# Patient Record
Sex: FEMALE | Race: WHITE | Employment: FULL TIME | URBAN - METROPOLITAN AREA
[De-identification: names, ages, dates, MRNs, and addresses within clinical notes are randomized per-mention and may not be internally consistent; named-entity substitution may affect disease eponyms.]

---

## 2017-02-28 ENCOUNTER — APPOINTMENT (OUTPATIENT)
Dept: CT IMAGING | Facility: CLINIC | Age: 27
End: 2017-02-28
Attending: EMERGENCY MEDICINE
Payer: COMMERCIAL

## 2017-02-28 ENCOUNTER — HOSPITAL ENCOUNTER (EMERGENCY)
Facility: CLINIC | Age: 27
Discharge: HOME | End: 2017-02-28
Attending: EMERGENCY MEDICINE | Admitting: EMERGENCY MEDICINE
Payer: COMMERCIAL

## 2017-02-28 VITALS
WEIGHT: 274 LBS | RESPIRATION RATE: 15 BRPM | DIASTOLIC BLOOD PRESSURE: 72 MMHG | SYSTOLIC BLOOD PRESSURE: 113 MMHG | HEART RATE: 94 BPM | TEMPERATURE: 98.5 F | OXYGEN SATURATION: 99 % | BODY MASS INDEX: 50.12 KG/M2

## 2017-02-28 DIAGNOSIS — R55 SYNCOPE, UNSPECIFIED SYNCOPE TYPE: ICD-10-CM

## 2017-02-28 LAB
ANION GAP SERPL CALCULATED.3IONS-SCNC: 10 MMOL/L (ref 3–14)
BASOPHILS # BLD AUTO: 0 10E9/L (ref 0–0.2)
BASOPHILS NFR BLD AUTO: 0.4 %
BUN SERPL-MCNC: 14 MG/DL (ref 7–30)
CALCIUM SERPL-MCNC: 9 MG/DL (ref 8.5–10.1)
CHLORIDE SERPL-SCNC: 108 MMOL/L (ref 94–109)
CO2 SERPL-SCNC: 24 MMOL/L (ref 20–32)
CREAT SERPL-MCNC: 0.9 MG/DL (ref 0.52–1.04)
D DIMER PPP FEU-MCNC: 0.4 UG/ML FEU (ref 0–0.5)
DIFFERENTIAL METHOD BLD: NORMAL
EOSINOPHIL # BLD AUTO: 0.1 10E9/L (ref 0–0.7)
EOSINOPHIL NFR BLD AUTO: 1.2 %
ERYTHROCYTE [DISTWIDTH] IN BLOOD BY AUTOMATED COUNT: 12.9 % (ref 10–15)
GFR SERPL CREATININE-BSD FRML MDRD: 76 ML/MIN/1.7M2
GLUCOSE BLDC GLUCOMTR-MCNC: 106 MG/DL (ref 70–99)
GLUCOSE SERPL-MCNC: 92 MG/DL (ref 70–99)
HCG SERPL QL: NEGATIVE
HCT VFR BLD AUTO: 43.5 % (ref 35–47)
HGB BLD-MCNC: 15 G/DL (ref 11.7–15.7)
IMM GRANULOCYTES # BLD: 0 10E9/L (ref 0–0.4)
IMM GRANULOCYTES NFR BLD: 0.3 %
INTERPRETATION ECG - MUSE: NORMAL
LYMPHOCYTES # BLD AUTO: 3.4 10E9/L (ref 0.8–5.3)
LYMPHOCYTES NFR BLD AUTO: 45 %
MCH RBC QN AUTO: 31.7 PG (ref 26.5–33)
MCHC RBC AUTO-ENTMCNC: 34.5 G/DL (ref 31.5–36.5)
MCV RBC AUTO: 92 FL (ref 78–100)
MONOCYTES # BLD AUTO: 0.3 10E9/L (ref 0–1.3)
MONOCYTES NFR BLD AUTO: 3.9 %
NEUTROPHILS # BLD AUTO: 3.8 10E9/L (ref 1.6–8.3)
NEUTROPHILS NFR BLD AUTO: 49.2 %
NRBC # BLD AUTO: 0 10*3/UL
NRBC BLD AUTO-RTO: 0 /100
PLATELET # BLD AUTO: 293 10E9/L (ref 150–450)
POTASSIUM SERPL-SCNC: 3.4 MMOL/L (ref 3.4–5.3)
RBC # BLD AUTO: 4.73 10E12/L (ref 3.8–5.2)
SODIUM SERPL-SCNC: 142 MMOL/L (ref 133–144)
WBC # BLD AUTO: 7.6 10E9/L (ref 4–11)

## 2017-02-28 PROCEDURE — 00000146 ZZHCL STATISTIC GLUCOSE BY METER IP

## 2017-02-28 PROCEDURE — 99284 EMERGENCY DEPT VISIT MOD MDM: CPT | Mod: 25 | Performed by: EMERGENCY MEDICINE

## 2017-02-28 PROCEDURE — 85025 COMPLETE CBC W/AUTO DIFF WBC: CPT | Performed by: EMERGENCY MEDICINE

## 2017-02-28 PROCEDURE — 25000128 H RX IP 250 OP 636: Performed by: EMERGENCY MEDICINE

## 2017-02-28 PROCEDURE — 70450 CT HEAD/BRAIN W/O DYE: CPT

## 2017-02-28 PROCEDURE — 85379 FIBRIN DEGRADATION QUANT: CPT | Performed by: EMERGENCY MEDICINE

## 2017-02-28 PROCEDURE — 93005 ELECTROCARDIOGRAM TRACING: CPT | Performed by: EMERGENCY MEDICINE

## 2017-02-28 PROCEDURE — 99285 EMERGENCY DEPT VISIT HI MDM: CPT | Mod: 25 | Performed by: EMERGENCY MEDICINE

## 2017-02-28 PROCEDURE — 25000128 H RX IP 250 OP 636

## 2017-02-28 PROCEDURE — 84703 CHORIONIC GONADOTROPIN ASSAY: CPT | Performed by: EMERGENCY MEDICINE

## 2017-02-28 PROCEDURE — 96361 HYDRATE IV INFUSION ADD-ON: CPT | Performed by: EMERGENCY MEDICINE

## 2017-02-28 PROCEDURE — 93010 ELECTROCARDIOGRAM REPORT: CPT | Mod: Z6 | Performed by: EMERGENCY MEDICINE

## 2017-02-28 PROCEDURE — 96375 TX/PRO/DX INJ NEW DRUG ADDON: CPT | Mod: 59 | Performed by: EMERGENCY MEDICINE

## 2017-02-28 PROCEDURE — 96374 THER/PROPH/DIAG INJ IV PUSH: CPT | Mod: 59 | Performed by: EMERGENCY MEDICINE

## 2017-02-28 PROCEDURE — 80048 BASIC METABOLIC PNL TOTAL CA: CPT | Performed by: EMERGENCY MEDICINE

## 2017-02-28 RX ORDER — KETOROLAC TROMETHAMINE 30 MG/ML
30 INJECTION, SOLUTION INTRAMUSCULAR; INTRAVENOUS ONCE
Status: COMPLETED | OUTPATIENT
Start: 2017-02-28 | End: 2017-02-28

## 2017-02-28 RX ORDER — SODIUM CHLORIDE 9 MG/ML
INJECTION, SOLUTION INTRAVENOUS
Status: COMPLETED
Start: 2017-02-28 | End: 2017-02-28

## 2017-02-28 RX ORDER — ONDANSETRON 2 MG/ML
4 INJECTION INTRAMUSCULAR; INTRAVENOUS ONCE
Status: COMPLETED | OUTPATIENT
Start: 2017-02-28 | End: 2017-02-28

## 2017-02-28 RX ADMIN — KETOROLAC TROMETHAMINE 30 MG: 30 INJECTION, SOLUTION INTRAMUSCULAR at 11:11

## 2017-02-28 RX ADMIN — ONDANSETRON 4 MG: 2 INJECTION INTRAMUSCULAR; INTRAVENOUS at 11:18

## 2017-02-28 RX ADMIN — Medication 1000 ML: at 11:09

## 2017-02-28 RX ADMIN — SODIUM CHLORIDE 1000 ML: 9 INJECTION, SOLUTION INTRAVENOUS at 11:09

## 2017-02-28 ASSESSMENT — ENCOUNTER SYMPTOMS
NAUSEA: 1
NECK STIFFNESS: 0
COLOR CHANGE: 0
LIGHT-HEADEDNESS: 1
DIFFICULTY URINATING: 0
ABDOMINAL PAIN: 0
HEADACHES: 0
SHORTNESS OF BREATH: 0
CONFUSION: 0
ARTHRALGIAS: 0
EYE REDNESS: 0
FEVER: 0

## 2017-02-28 NOTE — DISCHARGE INSTRUCTIONS
Please make an appointment to follow up with Cardiology(Fainting) Clinic (phone: [phone]).

## 2017-02-28 NOTE — ED AVS SNAPSHOT
[facility], Emergency Department    [address]    [place] [address]    Phone:  [phone]    Fax:  [phone]                                       Alva Joshi   MRN: [id number]    Department:  [facility], Emergency Department   Date of Visit:  2/28/2017           Patient Information     Date Of Birth          1990        Your diagnoses for this visit were:     Syncope, unspecified syncope type        You were seen by Hitesh Oh MD.        Discharge Instructions       Please make an appointment to follow up with Cardiology(Fainting) Clinic (phone: [phone]).     Discharge References/Attachments     SYNCOPE, UNK CAUSE (ENGLISH)      24 Hour Appointment Hotline       To make an appointment at any [facility] clinic, call [phone] ([phone]). If you don't have a family doctor or clinic, we will help you find one. [facility] clinics are conveniently located to serve the needs of you and your family.             Review of your medicines      Our records show that you are taking the medicines listed below. If these are incorrect, please call your family doctor or clinic.        Dose / Directions Last dose taken    MINOCYCLINE HCL PO   Dose:  50 mg        Take 50 mg by mouth 2 times daily   Refills:  0        MIRENA (52 MG) 20 MCG/24HR IUD   Dose:  1 each   Generic drug:  levonorgestrel        1 each by Intrauterine route once   Refills:  0                Procedures and tests performed during your visit     Basic metabolic panel    CBC with platelets differential    D dimer quantitative    EKG 12 lead    Glucose by meter    HCG qualitative pregnancy (blood)    Head CT w/o contrast      Orders Needing Specimen Collection     None      Pending Results     No orders found from 2/26/2017 to 3/1/2017.            Pending Culture Results     No orders found from 2/26/2017 to 3/1/2017.            Thank you for choosing [facility]       Thank you for choosing [facility] for your care. Our  "goal is always to provide you with excellent care. Hearing back from our patients is one way we can continue to improve our services. Please take a few minutes to complete the written survey that you may receive in the mail after you visit with us. Thank you!        [facility] Information     [facility] lets you send messages to your doctor, view your test results, renew your prescriptions, schedule appointments and more. To sign up, go to [email or web address] . Click on \"Log in\" on the left side of the screen, which will take you to the Welcome page. Then click on \"Sign up Now\" on the right side of the page.     You will be asked to enter the access code listed below, as well as some personal information. Please follow the directions to create your username and password.     Your access code is: [email or web address]  Expires: 2017  3:33 PM     Your access code will  in 90 days. If you need help or a new code, please call your [place] clinic or [phone].        Care EveryWhere ID     This is your Care EveryWhere ID. This could be used by other organizations to access your [place] medical records  [id number]        After Visit Summary       This is your record. Keep this with you and show to your community pharmacist(s) and doctor(s) at your next visit.                  "

## 2017-02-28 NOTE — ED NOTES
Patient was at work and taking care of patient and she was standing and felt abdominal cramps.  Then felt she was getting hot and sweaty and then passed out.  She hit front of right upper forehead and has a small bump on head.  She c/o headache. Staff stated she had LOC for about 2 minutes.  She stated she ate a croissant for breakfast ( in ED) and had some coffee.  She denies history of seizures or of ever having fainted before.

## 2017-02-28 NOTE — ED AVS SNAPSHOT
[facility], [place], Emergency Department    [address]    [place] [address]    Phone:  [phone]    Fax:  [phone]                                       Alva Joshi   MRN: [id number]    Department:  [facility], Emergency Department   Date of Visit:  2/28/2017           After Visit Summary Signature Page     I have received my discharge instructions, and my questions have been answered. I have discussed any challenges I see with this plan with the nurse or doctor.    ..........................................................................................................................................  Patient/Patient Representative Signature      ..........................................................................................................................................  Patient Representative Print Name and Relationship to Patient    ..................................................               ................................................  Date                                            Time    ..........................................................................................................................................  Reviewed by Signature/Title    ...................................................              ..............................................  Date                                                            Time

## 2017-02-28 NOTE — ED PROVIDER NOTES
History     Chief Complaint   Patient presents with     Loss of Consciousness     happened about 10am while working in [employer]     HPI  Alva Joshi is a 26 year old female who presents to the emergency department after a episode of loss of consciousness while at work.  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  Patient states that after she experienced the pelvic cramping, she began to feel lightheaded, sweaty, and nauseous.  The patient states that she had a loss of consciousness and fell forward and struck her right forehead.  The patient states that she awoke soon after.  She denies any postictal period.  She denies any tongue biting or bowel or bladder incontinence.  The patient complains of pain where she struck her head in the right forehead.  She denies any visual disturbance.  She denies neck pain.  Patient denies any chest pain or dyspnea.  She states that the pelvic cramping has resolved.  She denies abdominal pain.  She denies nausea, vomiting, and diarrhea.  Patient states that she flew to and from [place] last weekend but she denies any leg pain or swelling.  She denies a history of fainting in the past.  She did eat this morning.    I have reviewed the Medications, Allergies, Past Medical and Surgical History, and Social History in the Epic system.    Review of Systems   Constitutional: Negative for fever.   HENT: Negative for congestion.    Eyes: Negative for redness.   Respiratory: Negative for shortness of breath.    Cardiovascular: Negative for chest pain.   Gastrointestinal: Positive for nausea. Negative for abdominal pain.   Genitourinary: Negative for difficulty urinating.   Musculoskeletal: Negative for arthralgias and neck stiffness.   Skin: Negative for color change.   Neurological: Positive for syncope and light-headedness. Negative for headaches.    Psychiatric/Behavioral: Negative for confusion.   All other systems reviewed and are negative.      Physical Exam   BP: 116/87  Pulse: 115  Temp: 98.5  F (36.9  C)  Resp: 19  Weight: 124.3 kg (274 lb)  SpO2: 100 %  Physical Exam   Constitutional: She is oriented to person, place, and time. She appears well-developed and well-nourished. No distress.   HENT:   Head: Normocephalic.       Mouth/Throat: Oropharynx is clear and moist. No oropharyngeal exudate.   Eyes: Pupils are equal, round, and reactive to light. No scleral icterus.   Neck: Normal range of motion. No spinous process tenderness and no muscular tenderness present. Normal range of motion present.   Cardiovascular: Normal heart sounds and intact distal pulses.  Tachycardia present.    Pulmonary/Chest: Effort normal and breath sounds normal. No respiratory distress.   Abdominal: Soft. Bowel sounds are normal. There is no tenderness.   Musculoskeletal: Normal range of motion. She exhibits no edema or tenderness.   Neurological: She is alert and oriented to person, place, and time. She has normal strength. No cranial nerve deficit or sensory deficit. Coordination normal.   Skin: Skin is warm and dry. No rash noted. She is not diaphoretic.   Nursing note and vitals reviewed.      ED Course     ED Course     Procedures             EKG Interpretation:      Interpreted by NEGAR SAENZ MD  Time reviewed: 1049  Symptoms at time of EKG: None, syncope   Rhythm: sinus tachycardia  Rate: 101  Axis: Normal  Ectopy: none  Conduction: normal  ST Segments/ T Waves: No ST-T wave changes  Q Waves: none  Comparison to prior: No old EKG available    Clinical Impression: sinus tachycardia    Results for orders placed or performed during the hospital encounter of 02/28/17   Head CT w/o contrast    Narrative    CT SCAN OF THE HEAD WITHOUT CONTRAST   2/28/2017  3:01 PM     HISTORY: Headache, trauma.    TECHNIQUE: Axial images of the head and coronal reformations without  IV  contrast material.  Radiation dose for this scan was reduced using  automated exposure control, adjustment of the mA and/or kV according  to patient size, or iterative reconstruction technique.    COMPARISON: None.    FINDINGS: The ventricles are normal in size, shape and configuration.  The brain parenchyma and subarachnoid spaces are normal. There is no  evidence of intracranial hemorrhage, mass, acute infarct or anomaly.     The visualized portions of the sinuses and mastoids appear normal.  There is no evidence of trauma.      Impression    IMPRESSION: Normal CT scan of the head.    MILENA PARIS MD   Glucose by meter   Result Value Ref Range    Glucose 106 (H) 70 - 99 mg/dL   CBC with platelets differential   Result Value Ref Range    WBC 7.6 4.0 - 11.0 10e9/L    RBC Count 4.73 3.8 - 5.2 10e12/L    Hemoglobin 15.0 11.7 - 15.7 g/dL    Hematocrit 43.5 35.0 - 47.0 %    MCV 92 78 - 100 fl    MCH 31.7 26.5 - 33.0 pg    MCHC 34.5 31.5 - 36.5 g/dL    RDW 12.9 10.0 - 15.0 %    Platelet Count 293 150 - 450 10e9/L    Diff Method Automated Method     % Neutrophils 49.2 %    % Lymphocytes 45.0 %    % Monocytes 3.9 %    % Eosinophils 1.2 %    % Basophils 0.4 %    % Immature Granulocytes 0.3 %    Nucleated RBCs 0 0 /100    Absolute Neutrophil 3.8 1.6 - 8.3 10e9/L    Absolute Lymphocytes 3.4 0.8 - 5.3 10e9/L    Absolute Monocytes 0.3 0.0 - 1.3 10e9/L    Absolute Eosinophils 0.1 0.0 - 0.7 10e9/L    Absolute Basophils 0.0 0.0 - 0.2 10e9/L    Abs Immature Granulocytes 0.0 0 - 0.4 10e9/L    Absolute Nucleated RBC 0.0    Basic metabolic panel   Result Value Ref Range    Sodium 142 133 - 144 mmol/L    Potassium 3.4 3.4 - 5.3 mmol/L    Chloride 108 94 - 109 mmol/L    Carbon Dioxide 24 20 - 32 mmol/L    Anion Gap 10 3 - 14 mmol/L    Glucose 92 70 - 99 mg/dL    Urea Nitrogen 14 7 - 30 mg/dL    Creatinine 0.90 0.52 - 1.04 mg/dL    GFR Estimate 76 >60 mL/min/1.7m2    GFR Estimate If Black >90   GFR Calc   >60 mL/min/1.7m2     Calcium 9.0 8.5 - 10.1 mg/dL   HCG qualitative pregnancy (blood)   Result Value Ref Range    HCG Qualitative Serum Negative NEG   D dimer quantitative   Result Value Ref Range    D Dimer 0.4 0.0 - 0.50 ug/ml FEU         Critical Care time:      Assessments & Plan (with Medical Decision Making)   26 year old female to the emergency department after a syncopal episode while at work.  The patient did strike her head.  The patient did not have any significant pre-event symptoms or events that would make this episode classic for vasovagal syncope, although that is the most likely etiology.  The patient does not have any acute changes on her EKG.  Her labs are normal.  Her head CT is also normal.  The patient appears and feels clinically well here in the emergency department.  She will be discharged home.  Because of the uncertain etiology of her syncopal episode, I have asked her to follow-up in the cardiology fainting clinic.  In the meantime, she is also asked to not drive, bathe/swim alone, or perform other potentially dangerous tasks.    I have reviewed the nursing notes.    I have reviewed the findings, diagnosis, plan and need for follow up with the patient.    New Prescriptions    No medications on file       Final diagnoses:   Syncope, unspecified syncope type       2/28/2017   [facility], [place], EMERGENCY DEPARTMENT     Hitesh Oh MD  02/28/17 2677

## 2018-03-06 ENCOUNTER — TELEPHONE (OUTPATIENT)
Dept: OTHER | Facility: CLINIC | Age: 28
End: 2018-03-06

## 2018-04-04 NOTE — PROGRESS NOTES
Alva presents with a cystic acne lesion on the left chin. She would like to have this treated.   After risks and benefits including atrophy were discussed, 0.1ML of kenalog 2.5mg/ml were injected into the area on the left chin   This was well tolerated. No complications.

## 2018-04-09 ENCOUNTER — ALLIED HEALTH/NURSE VISIT (OUTPATIENT)
Dept: PEDIATRICS | Facility: CLINIC | Age: 28
End: 2018-04-09
Payer: COMMERCIAL

## 2018-04-09 DIAGNOSIS — J02.0 STREPTOCOCCAL SORE THROAT: Primary | ICD-10-CM

## 2018-04-09 LAB
DEPRECATED S PYO AG THROAT QL EIA: NORMAL
SPECIMEN SOURCE: NORMAL

## 2018-04-09 PROCEDURE — 87880 STREP A ASSAY W/OPTIC: CPT | Performed by: DERMATOLOGY

## 2018-04-09 PROCEDURE — 87081 CULTURE SCREEN ONLY: CPT | Performed by: DERMATOLOGY

## 2018-04-09 PROCEDURE — 40000269 ZZH STATISTIC NO CHARGE FACILITY FEE: Mod: ZF

## 2018-04-09 NOTE — MR AVS SNAPSHOT
After Visit Summary   2018    Alva Joshi    MRN: [id number]           Patient Information     Date Of Birth          1990        Visit Information        Provider Department      2018 3:00 PM [facility] PEDS NURSE [facility]        Today's Diagnoses     Streptococcal sore throat    -  1       Follow-ups after your visit        Who to contact     Please call your clinic at [phone] to:    Ask questions about your health    Make or cancel appointments    Discuss your medicines    Learn about your test results    Speak to your doctor            Additional Information About Your Visit        MyChart Information     "[employer] is an electronic gateway that provides easy, online access to your medical records. With "[employer] you can request a clinic appointment, read your test results, renew a prescription or communicate with your care team.     To sign up for "[employer] visit the website at [email or web address]   You will be asked to enter the access code listed below, as well as some personal information. Please follow the directions to create your username and password.     Your access code is: [email or web address]  Expires: 2018 12:19 PM     Your access code will  in 90 days. If you need help or a new code, please contact your [facility] or call [phone] for assistance.        Care EveryWhere ID     This is your Care EveryWhere ID. This could be used by other organizations to access your [place] medical records  [id number]         Blood Pressure from Last 3 Encounters:   17 113/72   09/02/15 106/70   10/28/11 122/53    Weight from Last 3 Encounters:   17 274 lb (124.3 kg)   10/28/11 259 lb (117.5 kg)              We Performed the Following     Beta strep group A culture     Rapid strep screen        Primary Care Provider Office Phone # Fax #    Lydia Jean-Baptiste [phone]       [facility] [address]  [place] [address]        Equal Access to Services     BILLIEMICHELLE ALBA : Hadii caitie york desidae Tinyali, wajeniseda demarcoandreha, goranta taryngulshantriston howell, radha velascopolyosmel de la cruz. So [facility] [phone].    ATENCIÓN: Si habla español, tiene a bowling disposición servicios gratuitos de asistencia lingüística. Llame al [phone].    We comply with applicable federal civil rights laws and [place] laws. We do not discriminate on the basis of race, color, national origin, age, disability, sex, sexual orientation, or gender identity.            Thank you!     Thank you for choosing PEDIATRIC SPECIALTY CLINIC  for your care. Our goal is always to provide you with excellent care. Hearing back from our patients is one way we can continue to improve our services. Please take a few minutes to complete the written survey that you may receive in the mail after your visit with us. Thank you!             Your Updated Medication List - Protect others around you: Learn how to safely use, store and throw away your medicines at www.disposemymeds.org.          This list is accurate as of 4/9/18 11:59 PM.  Always use your most recent med list.                   Brand Name Dispense Instructions for use Diagnosis    MINOCYCLINE HCL PO      Take 50 mg by mouth 2 times daily        MIRENA (52 MG) 20 MCG/24HR IUD   Generic drug:  levonorgestrel      1 each by Intrauterine route once

## 2018-04-11 LAB
BACTERIA SPEC CULT: NORMAL
SPECIMEN SOURCE: NORMAL

## 2018-04-18 NOTE — NURSING NOTE
Chief Complaint   Patient presents with     Allied Health Visit     Sore throat     Jeni Garnett, CMA

## 2018-06-07 ENCOUNTER — TELEPHONE (OUTPATIENT)
Dept: DERMATOLOGY | Facility: CLINIC | Age: 28
End: 2018-06-07

## 2018-06-07 NOTE — TELEPHONE ENCOUNTER
----- Message from Jeni Garnett sent at 6/7/2018 12:32 PM CDT -----  Regarding: new patient   Hello,  Can you please fit this patient in sooner rather than later per Dr. Joseph. It will be for acne. Early morning is best.    Jeni Garnett, [facility]

## 2018-06-07 NOTE — TELEPHONE ENCOUNTER
Called and left message for patient to call back with clinic number. When patient calls please assist with scheduling appointment 6/12/18 10:15 am or 11:45am  Ramona Mulligan MA

## 2018-06-15 NOTE — TELEPHONE ENCOUNTER
2nd attempt to reach patient. Left message with clinic number for patient to call back. Ramona Mulligan MA

## 2018-10-19 ENCOUNTER — OFFICE VISIT (OUTPATIENT)
Dept: URGENT CARE | Facility: URGENT CARE | Age: 28
End: 2018-10-19
Payer: COMMERCIAL

## 2018-10-19 VITALS — OXYGEN SATURATION: 98 % | BODY MASS INDEX: 49.79 KG/M2 | WEIGHT: 272.2 LBS | TEMPERATURE: 97.9 F | HEART RATE: 109 BPM

## 2018-10-19 DIAGNOSIS — R05.9 COUGH: Primary | ICD-10-CM

## 2018-10-19 PROCEDURE — 99203 OFFICE O/P NEW LOW 30 MIN: CPT | Performed by: FAMILY MEDICINE

## 2018-10-19 RX ORDER — BENZONATATE 100 MG/1
200 CAPSULE ORAL 3 TIMES DAILY PRN
Qty: 42 CAPSULE | Refills: 3 | Status: SHIPPED | OUTPATIENT
Start: 2018-10-19 | End: 2020-02-25

## 2018-10-19 RX ORDER — DOXYCYCLINE 100 MG/1
100 CAPSULE ORAL 2 TIMES DAILY
Qty: 20 CAPSULE | Refills: 0 | Status: SHIPPED | OUTPATIENT
Start: 2018-10-19 | End: 2018-10-29

## 2018-10-19 RX ORDER — CODEINE PHOSPHATE AND GUAIFENESIN 10; 100 MG/5ML; MG/5ML
1-2 SOLUTION ORAL EVERY 6 HOURS PRN
Qty: 120 ML | Refills: 1 | Status: SHIPPED | OUTPATIENT
Start: 2018-10-19 | End: 2020-02-25

## 2018-10-19 NOTE — MR AVS SNAPSHOT
"              After Visit Summary   10/19/2018    Alva Joshi    MRN: [id number]           Patient Information     Date Of Birth          1990        Visit Information        Provider Department      10/19/2018 7:00 PM Cristian Mccarty MD [facility]        Today's Diagnoses     Cough    -  1      Care Instructions    Humidifier, Steam    Spoonfuls of honey    follow up if not better in 7-10 days.           Follow-ups after your visit        Who to contact     If you have questions or need follow up information about today's clinic visit or your schedule please contact [facility] directly at [phone].  Normal or non-critical lab and imaging results will be communicated to you by [facility], letter or phone within 4 business days after the clinic has received the results. If you do not hear from us within 7 days, please contact the clinic through [facility] or phone. If you have a critical or abnormal lab result, we will notify you by phone as soon as possible.  Submit refill requests through [employer] or call your pharmacy and they will forward the refill request to us. Please allow 3 business days for your refill to be completed.          Additional Information About Your Visit        MyChart Information     [employer] lets you send messages to your doctor, view your test results, renew your prescriptions, schedule appointments and more. To sign up, go to [email or web address] . Click on \"Log in\" on the left side of the screen, which will take you to the Welcome page. Then click on \"Sign up Now\" on the right side of the page.     You will be asked to enter the access code listed below, as well as some personal information. Please follow the directions to create your username and password.     Your access code is: [email or web address]  Expires: 2019  7:33 PM     Your access code will  in 90 days. If you need help or a new code, please call your [place] clinic or [phone].      "   Care EveryWhere ID     This is your Care EveryWhere ID. This could be used by other organizations to access your [place] medical records  [id number]        Your Vitals Were     Pulse Temperature Pulse Oximetry BMI (Body Mass Index)          109 97.9  F (36.6  C) (Tympanic) 98% 49.79 kg/m2         Blood Pressure from Last 3 Encounters:   02/28/17 113/72   09/02/15 106/70   10/28/11 122/53    Weight from Last 3 Encounters:   10/19/18 272 lb 3.2 oz (123.5 kg)   02/28/17 274 lb (124.3 kg)   10/28/11 259 lb (117.5 kg)              Today, you had the following     No orders found for display         Today's Medication Changes          These changes are accurate as of 10/19/18  7:33 PM.  If you have any questions, ask your nurse or doctor.               Start taking these medicines.        Dose/Directions    benzonatate 100 MG capsule   Commonly known as:  TESSALON   Used for:  Cough   Started by:  Cristian Mccarty MD        Dose:  200 mg   Take 2 capsules (200 mg) by mouth 3 times daily as needed for cough   Quantity:  42 capsule   Refills:  3       doxycycline monohydrate 100 MG capsule   Used for:  Cough   Started by:  Cristian Mccarty MD        Dose:  100 mg   Take 1 capsule (100 mg) by mouth 2 times daily for 10 days   Quantity:  20 capsule   Refills:  0       guaiFENesin-codeine 100-10 MG/5ML Soln solution   Commonly known as:  ROBITUSSIN AC   Used for:  Cough   Started by:  Cristian Mccarty MD        Dose:  1-2 tsp.   Take 5-10 mLs by mouth every 6 hours as needed for cough   Quantity:  120 mL   Refills:  1            Where to get your medicines      These medications were sent to [id number] [place] - [address]  [address]  [address] [place] [address]     Phone:  [phone]     benzonatate 100 MG capsule    doxycycline monohydrate 100 MG capsule         Some of these will need a paper prescription and others can be bought over the counter.  Ask your nurse if  you have questions.     Bring a paper prescription for each of these medications     guaiFENesin-codeine 100-10 MG/5ML Soln solution                Primary Care Provider Fax #    Physician No Ref-Primary [phone]       No address on file        Equal Access to Services     PARAM WILLIS : Julieta caitie york michael Sotameraali, waaxda luqadaha, qaybta kaalmada adejessica, radha cuevasjesse de la cruz. So [facility] [phone].    ATENCIÓN: Si habla español, tiene a bowling disposición servicios gratuitos de asistencia lingüística. Llame al [phone].    We comply with applicable federal civil rights laws and [place] laws. We do not discriminate on the basis of race, color, national origin, age, disability, sex, sexual orientation, or gender identity.            Thank you!     Thank you for choosing [facility]  for your care. Our goal is always to provide you with excellent care. Hearing back from our patients is one way we can continue to improve our services. Please take a few minutes to complete the written survey that you may receive in the mail after your visit with us. Thank you!             Your Updated Medication List - Protect others around you: Learn how to safely use, store and throw away your medicines at www.disposemymeds.org.          This list is accurate as of 10/19/18  7:33 PM.  Always use your most recent med list.                   Brand Name Dispense Instructions for use Diagnosis    benzonatate 100 MG capsule    TESSALON    42 capsule    Take 2 capsules (200 mg) by mouth 3 times daily as needed for cough    Cough       doxycycline monohydrate 100 MG capsule     20 capsule    Take 1 capsule (100 mg) by mouth 2 times daily for 10 days    Cough       guaiFENesin-codeine 100-10 MG/5ML Soln solution    ROBITUSSIN AC    120 mL    Take 5-10 mLs by mouth every 6 hours as needed for cough    Cough       MINOCYCLINE HCL PO      Take 50 mg by mouth 2 times daily        * MIRENA (52 MG) 20  MCG/24HR IUD   Generic drug:  levonorgestrel      1 each by Intrauterine route once        * levonorgestrel 20 MCG/24HR IUD    MIRENA     1 Device by Intrauterine route        * Notice:  This list has 2 medication(s) that are the same as other medications prescribed for you. Read the directions carefully, and ask your doctor or other care provider to review them with you.

## 2018-10-20 NOTE — PROGRESS NOTES
"SUBJECTIVE:   Alva Joshi is a 28 year old female presenting with a chief complaint of a two-week history of cough (productive of yellowish-green mucus, the cough has been disturbing her sleep, the cough is worse when supine) that has worsened over the past 3-4 days and chest heaviness has been occurring recently.  .  Onset of symptoms was two weeks ago.  Course of illness is worsening..    Current and Associated symptoms: as listed above.   Treatment measures tried include Mucinex DM, Robitussin, Sudafed.  .  Predisposing factors include none.  Patient works at [employer].  .    Past Medical History:   Diagnosis Date     Endometriosis      Gastro-oesophageal reflux disease      Migraines      Numbness and tingling     right shoulder \"Pins and Needles\"     PONV (postoperative nausea and vomiting)      Current Outpatient Prescriptions   Medication Sig Dispense Refill     levonorgestrel (MIRENA) 20 MCG/24HR IUD 1 Device by Intrauterine route       levonorgestrel (MIRENA) 20 MCG/24HR IUD 1 each by Intrauterine route once       MINOCYCLINE HCL PO Take 50 mg by mouth 2 times daily       Social History   Substance Use Topics     Smoking status: Never Smoker     Smokeless tobacco: Never Used     Alcohol use Yes      Comment: socially       ROS:  Review of systems negative except as stated above.    OBJECTIVE:  Pulse 109  Temp 97.9  F (36.6  C) (Tympanic)  Wt 272 lb 3.2 oz (123.5 kg)  SpO2 98%  BMI 49.79 kg/m2  GENERAL APPEARANCE: healthy, alert and no distress. Frequent dry coughing attacks.   HENT: TM's normal bilaterally, nasal turbinates erythematous, swollen and oropharynx is mildly erythematous without exudates.   NECK: supple, nontender, no lymphadenopathy  RESP: rhonchi R lower posterior and L lower posterior  CV: regular rates and rhythm, normal S1 S2, no murmur noted  SKIN: no suspicious lesions or rashes    ASSESSMENT:  Cough    PLAN:  Rx:  Doxycycline, Cheratussin AC, Tessalon " Perles  Humidifier, Steam  Spoonfuls of honey  follow up if not better in 7-10 days.   See orders in Epic    Cristian Mccarty MD

## 2018-11-06 DIAGNOSIS — L73.9 FOLLICULITIS: Primary | ICD-10-CM

## 2018-11-06 RX ORDER — CEPHALEXIN 500 MG/1
500 CAPSULE ORAL 2 TIMES DAILY
Qty: 20 CAPSULE | Refills: 0 | Status: SHIPPED | OUTPATIENT
Start: 2018-11-06 | End: 2018-11-16

## 2018-11-10 DIAGNOSIS — B37.31 VAGINAL YEAST INFECTION: Primary | ICD-10-CM

## 2018-11-10 RX ORDER — FLUCONAZOLE 150 MG/1
150 TABLET ORAL ONCE
Qty: 1 TABLET | Refills: 1 | Status: SHIPPED | OUTPATIENT
Start: 2018-11-10 | End: 2018-11-10

## 2018-12-05 ENCOUNTER — APPOINTMENT (OUTPATIENT)
Dept: LAB | Facility: CLINIC | Age: 28
End: 2018-12-05
Attending: ADVANCED PRACTICE MIDWIFE
Payer: COMMERCIAL

## 2018-12-05 ENCOUNTER — OFFICE VISIT (OUTPATIENT)
Dept: OBGYN | Facility: CLINIC | Age: 28
End: 2018-12-05
Attending: ADVANCED PRACTICE MIDWIFE
Payer: COMMERCIAL

## 2018-12-05 VITALS
BODY MASS INDEX: 53.83 KG/M2 | SYSTOLIC BLOOD PRESSURE: 136 MMHG | WEIGHT: 274.2 LBS | DIASTOLIC BLOOD PRESSURE: 84 MMHG | HEART RATE: 103 BPM | HEIGHT: 60 IN

## 2018-12-05 DIAGNOSIS — B37.31 YEAST INFECTION OF THE VAGINA: ICD-10-CM

## 2018-12-05 DIAGNOSIS — Z12.4 CERVICAL CANCER SCREENING: ICD-10-CM

## 2018-12-05 DIAGNOSIS — Z01.419 ENCOUNTER FOR ANNUAL ROUTINE GYNECOLOGICAL EXAMINATION: Primary | ICD-10-CM

## 2018-12-05 DIAGNOSIS — Z11.3 SCREENING EXAMINATION FOR VENEREAL DISEASE: ICD-10-CM

## 2018-12-05 DIAGNOSIS — Z11.4 SCREENING FOR HUMAN IMMUNODEFICIENCY VIRUS: ICD-10-CM

## 2018-12-05 LAB
CLUE CELLS: NEGATIVE
TRICHOMONAS (WET PREP): NEGATIVE
YEAST (WET PREP): POSITIVE

## 2018-12-05 PROCEDURE — 87210 SMEAR WET MOUNT SALINE/INK: CPT | Mod: ZF | Performed by: ADVANCED PRACTICE MIDWIFE

## 2018-12-05 PROCEDURE — 86780 TREPONEMA PALLIDUM: CPT | Performed by: ADVANCED PRACTICE MIDWIFE

## 2018-12-05 PROCEDURE — 87591 N.GONORRHOEAE DNA AMP PROB: CPT | Performed by: ADVANCED PRACTICE MIDWIFE

## 2018-12-05 PROCEDURE — 36415 COLL VENOUS BLD VENIPUNCTURE: CPT | Performed by: ADVANCED PRACTICE MIDWIFE

## 2018-12-05 PROCEDURE — 87491 CHLMYD TRACH DNA AMP PROBE: CPT | Performed by: ADVANCED PRACTICE MIDWIFE

## 2018-12-05 PROCEDURE — 87389 HIV-1 AG W/HIV-1&-2 AB AG IA: CPT | Performed by: ADVANCED PRACTICE MIDWIFE

## 2018-12-05 PROCEDURE — G0145 SCR C/V CYTO,THINLAYER,RESCR: HCPCS | Performed by: ADVANCED PRACTICE MIDWIFE

## 2018-12-05 PROCEDURE — 87210 SMEAR WET MOUNT SALINE/INK: CPT | Performed by: ADVANCED PRACTICE MIDWIFE

## 2018-12-05 PROCEDURE — G0123 SCREEN CERV/VAG THIN LAYER: HCPCS | Performed by: ADVANCED PRACTICE MIDWIFE

## 2018-12-05 RX ORDER — FLUCONAZOLE 150 MG/1
150 TABLET ORAL ONCE
Qty: 3 TABLET | Refills: 1 | Status: SHIPPED | OUTPATIENT
Start: 2018-12-05 | End: 2019-09-30

## 2018-12-05 ASSESSMENT — ANXIETY QUESTIONNAIRES
6. BECOMING EASILY ANNOYED OR IRRITABLE: NOT AT ALL
7. FEELING AFRAID AS IF SOMETHING AWFUL MIGHT HAPPEN: NOT AT ALL
5. BEING SO RESTLESS THAT IT IS HARD TO SIT STILL: NOT AT ALL
GAD7 TOTAL SCORE: 0
2. NOT BEING ABLE TO STOP OR CONTROL WORRYING: NOT AT ALL
1. FEELING NERVOUS, ANXIOUS, OR ON EDGE: NOT AT ALL
3. WORRYING TOO MUCH ABOUT DIFFERENT THINGS: NOT AT ALL

## 2018-12-05 ASSESSMENT — PATIENT HEALTH QUESTIONNAIRE - PHQ9
5. POOR APPETITE OR OVEREATING: NOT AT ALL
SUM OF ALL RESPONSES TO PHQ QUESTIONS 1-9: 0

## 2018-12-05 ASSESSMENT — PAIN SCALES - GENERAL: PAINLEVEL: NO PAIN (0)

## 2018-12-05 NOTE — NURSING NOTE
Chief Complaint   Patient presents with     Establish Care     Vaginal Problem     yeast infection for the past three months      Health Maintenance Due   Topic Date Due     PHQ-2 Q1 YR  07/22/2003     HIV SCREEN (SYSTEM ASSIGNED)  07/22/2009     PAP SCREENING Q3 YR (SYSTEM ASSIGNED)  07/22/2012     INFLUENZA VACCINE (1) 09/01/2018     Cherelle Eaton CMA on 12/5/2018 at 3:12 PM

## 2018-12-05 NOTE — LETTER
Date:December 10, 2018      Patient was self referred, no letter generated. Do not send.        [facility] Information

## 2018-12-05 NOTE — LETTER
2018       RE: Alva Joshi  [address]  [place] [address]     Dear Colleague,    Thank you for referring your patient, Alva Joshi, to the [facility] at [facility]. Please see a copy of my visit note below.    Subjective:  Alva Joshi is a 28 year old female who presents today for her annual exam.  HPI:     - Reports recurrent yeast infection that first began in October, symptoms always appear before start of period. Denies any significant itching, but reports mild irritation and thick, white discharge. Denies odor or dysuria.    - In Oct was treated with doxycycline x 10-14 days, symptoms appeared after treatment. Pt used OTC 3d monistat and symptoms resolved. Symptoms recurred in Nov, before period, treated with 2 doses diflucan.    - Believes last pap was when she was 24 yo; denies history of abnormal paps.   - Sexually active with male partners, 5 partners in past year, occasional condom use. Agreeable to testing today.   - Hx of endometriosis, has had 3 surgeries for management   - Has Mirena IUD in place, some improve in endometriosis with IUD.   - Family hx of breast cancer    Menstrual History:  Obstetric History       T0      L0     SAB0   TAB0   Ectopic0   Multiple0   Live Births0          No LMP recorded.   Periods are regular q 28-30 days,  Dysmenorrhea mild, occurring first 1-2 days of flow and throughout menses. Cyclic symptoms include  NONE. Additional symptoms include NONE  Social History:  Current contraception: Mirena IUD  Number of partners in last year: 5    Social History     Social History     Marital status: Single     Spouse name: N/A     Number of children: N/A     Years of education: N/A     Occupational History     Not on file.     Social History Main Topics     Smoking status: Never Smoker     Smokeless tobacco: Never Used     Alcohol use Yes      Comment: socially     Drug use: No     Sexual  "activity: Yes     Partners: Male     Birth control/ protection: IUD     Other Topics Concern     Not on file     Social History Narrative     Past Screenings:  Health Maintenance   Topic Date Due     INFLUENZA VACCINE (1) 09/01/2018     PHQ-2 Q1 YR  12/05/2019     PAP SCREENING Q3 YR (SYSTEM ASSIGNED)  12/05/2021     TETANUS IMMUNIZATION (SYSTEM ASSIGNED)  09/12/2023     HIV SCREEN (SYSTEM ASSIGNED)  Completed     No results found for: PAP   History of abnormal Pap smear: No    Immunizations:  Immunization History   Administered Date(s) Administered     FLU 6-35 months 03/10/2011     HPV Quadrivalent 08/17/2007, 10/25/2007, 02/26/2008     Influenza (IIV3) PF 09/30/2012     Influenza Vaccine IM 3yrs+ 4 Valent IIV4 01/19/2016     Mantoux Tuberculin Skin Test 01/19/2016, 01/26/2016     Meningococcal (Menactra ) 07/29/2009     Meningococcal (Menveo ) 09/12/2013     Poliovirus, inactivated (IPV) 09/12/2013     TDAP Vaccine (Boostrix) 09/12/2013     Typhoid IM 09/12/2013     Yellow Fever 09/12/2013     History:  Allergies   Allergen Reactions     Augmentin Shortness Of Breath     Penicillins      Zithromax [Azithromycin] Diarrhea     Family History   Problem Relation Age of Onset     Breast Cancer Maternal Grandmother      Diabetes Paternal Grandmother      Breast Cancer Other      great grandmother, great aunt- dx 40's, no genetic testing     Ovarian Cancer Other      paternal great aunt     Past Medical History:   Diagnosis Date     Endometriosis      Gastro-oesophageal reflux disease      Migraines      Numbness and tingling     right shoulder \"Pins and Needles\"     PONV (postoperative nausea and vomiting)      Past Surgical History:   Procedure Laterality Date     ARTHROSCOPY SHOULDER Right 9/2/2015    Procedure: ARTHROSCOPY SHOULDER;  Surgeon: Rod Leiva MD;  Location: [facility] OR     ENT SURGERY      sinus surgery     ENT SURGERY      deviated septum fixed     INSERT INTRAUTERINE DEVICE  12/19/2016 "     ROS:  CV: NEGATIVE for chest pain, palpitations   GI: NEGATIVE for nausea, abdominal pain, heartburn, or change in bowel habits  : NEGATIVE for frequency, dysuria, or hematuria  C: NEGATIVE for fever, chills  E: NEGATIVE for vision changes   R: NEGATIVE for significant cough or SOB  M: NEGATIVE for significant arthralgias or myalgia  N: NEGATIVE for weakness, dizziness or paresthesias or headache    Physical Exam:  /84 (BP Location: Left arm, Patient Position: Sitting, Cuff Size: Adult Large)  Pulse 103  Ht 1.524 m (5')  Wt 124.4 kg (274 lb 3.2 oz)  BMI 53.55 kg/m2  BMI: Body mass index is 53.55 kg/(m^2).  GENERAL APPEARANCE: healthy, alert and no distress  NECK: no adenopathy, no asymmetry, masses, or scars and thyroid normal to palpation  RESP: lungs clear to auscultation - no rales, rhonchi or wheezes  BREAST: normal without masses, tenderness or nipple discharge and no palpable axillary masses or adenopathy  CV: regular rates and rhythm, normal S1 S2, no S3 or S4 and no murmur, click or rub  ABDOMEN: soft, nontender, without hepatosplenomegaly or masses    PELVIC EXAM:  External Genitalia: WNL  Bartholin's/Urethral Meatus/[place]'s (BUS):  WNL/Negative  Bladder:  WNL  Vagina:  Normal mucosa; white, clumpy discharge  Cervix:  nulliparous, IUD strings visible 2 cm from os  Uterus:  Bimanual deferred  Anus/Perineum:  Normal    WET PREP: pH wnl, hyphae noted  Gonorrhea and chlamydia screen obtained: YES      Assessment:  Annual Gyn exam within normal limits  STI screening  Yeast infection    Plan:  Orders Placed This Encounter   Procedures     Obtaining, preparing and conveyance of cervical or vaginal smear to laboratory.     HIV Antigen Antibody Combo     Treponema Abs w Reflex to RPR and Titer     Pap imaged thin layer screen reflex to HPV if ASCUS - recommended age 25 - 29 years     Wet Prep POCT     - Additional teaching done at this visit included: self breast exam and birth control  - Discussed  options for treatment of recurrent yeast infection, including longer course of diflucan (day 1, 4, and 7) or suppressive treatment weekly for 3-6 months. Pt desires week-long course at this time.  Also encouraged use of probiotic to restore normal vaginal awilda.   - GC/CT, HIV, treponema collected today  - RTC in one year for annual exam or with concerns.      Again, thank you for allowing me to participate in the care of your patient.      Sincerely,    KENDRICK Del Rosario CNM

## 2018-12-05 NOTE — PROGRESS NOTES
Subjective:  Alva Joshi is a 28 year old female who presents today for her annual exam.  HPI:     - Reports recurrent yeast infection that first began in October, symptoms always appear before start of period. Denies any significant itching, but reports mild irritation and thick, white discharge. Denies odor or dysuria.    - In Oct was treated with doxycycline x 10-14 days, symptoms appeared after treatment. Pt used OTC 3d monistat and symptoms resolved. Symptoms recurred in Nov, before period, treated with 2 doses diflucan.    - Believes last pap was when she was 26 yo; denies history of abnormal paps.   - Sexually active with male partners, 5 partners in past year, occasional condom use. Agreeable to testing today.   - Hx of endometriosis, has had 3 surgeries for management   - Has Mirena IUD in place, some improve in endometriosis with IUD.   - Family hx of breast cancer    Menstrual History:  Obstetric History       T0      L0     SAB0   TAB0   Ectopic0   Multiple0   Live Births0          No LMP recorded.   Periods are regular q 28-30 days,  Dysmenorrhea mild, occurring first 1-2 days of flow and throughout menses. Cyclic symptoms include  NONE. Additional symptoms include NONE  Social History:  Current contraception: Mirena IUD  Number of partners in last year: 5    Social History     Social History     Marital status: Single     Spouse name: N/A     Number of children: N/A     Years of education: N/A     Occupational History     Not on file.     Social History Main Topics     Smoking status: Never Smoker     Smokeless tobacco: Never Used     Alcohol use Yes      Comment: socially     Drug use: No     Sexual activity: Yes     Partners: Male     Birth control/ protection: IUD     Other Topics Concern     Not on file     Social History Narrative     Past Screenings:  Health Maintenance   Topic Date Due     INFLUENZA VACCINE (1) 2018     PHQ-2 Q1 YR  2019     PAP SCREENING Q3 YR  "(SYSTEM ASSIGNED)  12/05/2021     TETANUS IMMUNIZATION (SYSTEM ASSIGNED)  09/12/2023     HIV SCREEN (SYSTEM ASSIGNED)  Completed     No results found for: PAP   History of abnormal Pap smear: No    Immunizations:  Immunization History   Administered Date(s) Administered     FLU 6-35 months 03/10/2011     HPV Quadrivalent 08/17/2007, 10/25/2007, 02/26/2008     Influenza (IIV3) PF 09/30/2012     Influenza Vaccine IM 3yrs+ 4 Valent IIV4 01/19/2016     Mantoux Tuberculin Skin Test 01/19/2016, 01/26/2016     Meningococcal (Menactra ) 07/29/2009     Meningococcal (Menveo ) 09/12/2013     Poliovirus, inactivated (IPV) 09/12/2013     TDAP Vaccine (Boostrix) 09/12/2013     Typhoid IM 09/12/2013     Yellow Fever 09/12/2013     History:  Allergies   Allergen Reactions     Augmentin Shortness Of Breath     Penicillins      Zithromax [Azithromycin] Diarrhea     Family History   Problem Relation Age of Onset     Breast Cancer Maternal Grandmother      Diabetes Paternal Grandmother      Breast Cancer Other      great grandmother, great aunt- dx 40's, no genetic testing     Ovarian Cancer Other      paternal great aunt     Past Medical History:   Diagnosis Date     Endometriosis      Gastro-oesophageal reflux disease      Migraines      Numbness and tingling     right shoulder \"Pins and Needles\"     PONV (postoperative nausea and vomiting)      Past Surgical History:   Procedure Laterality Date     ARTHROSCOPY SHOULDER Right 9/2/2015    Procedure: ARTHROSCOPY SHOULDER;  Surgeon: Rod Leiva MD;  [facility]     ENT SURGERY      sinus surgery     ENT SURGERY      deviated septum fixed     INSERT INTRAUTERINE DEVICE  12/19/2016     ROS:  CV: NEGATIVE for chest pain, palpitations   GI: NEGATIVE for nausea, abdominal pain, heartburn, or change in bowel habits  : NEGATIVE for frequency, dysuria, or hematuria  C: NEGATIVE for fever, chills  E: NEGATIVE for vision changes   R: NEGATIVE for significant cough or SOB  M: " NEGATIVE for significant arthralgias or myalgia  N: NEGATIVE for weakness, dizziness or paresthesias or headache    Physical Exam:  /84 (BP Location: Left arm, Patient Position: Sitting, Cuff Size: Adult Large)  Pulse 103  Ht 1.524 m (5')  Wt 124.4 kg (274 lb 3.2 oz)  BMI 53.55 kg/m2  BMI: Body mass index is 53.55 kg/(m^2).  GENERAL APPEARANCE: healthy, alert and no distress  NECK: no adenopathy, no asymmetry, masses, or scars and thyroid normal to palpation  RESP: lungs clear to auscultation - no rales, rhonchi or wheezes  BREAST: normal without masses, tenderness or nipple discharge and no palpable axillary masses or adenopathy  CV: regular rates and rhythm, normal S1 S2, no S3 or S4 and no murmur, click or rub  ABDOMEN: soft, nontender, without hepatosplenomegaly or masses    PELVIC EXAM:  External Genitalia: WNL  Bartholin's/Urethral Meatus/[place]'s (BUS):  WNL/Negative  Bladder:  WNL  Vagina:  Normal mucosa; white, clumpy discharge  Cervix:  nulliparous, IUD strings visible 2 cm from os  Uterus:  Bimanual deferred  Anus/Perineum:  Normal    WET PREP: pH wnl, hyphae noted  Gonorrhea and chlamydia screen obtained: YES      Assessment:  Annual Gyn exam within normal limits  STI screening  Yeast infection    Plan:  Orders Placed This Encounter   Procedures     Obtaining, preparing and conveyance of cervical or vaginal smear to laboratory.     HIV Antigen Antibody Combo     Treponema Abs w Reflex to RPR and Titer     Pap imaged thin layer screen reflex to HPV if ASCUS - recommended age 25 - 29 years     Wet Prep POCT     - Additional teaching done at this visit included: self breast exam and birth control  - Discussed options for treatment of recurrent yeast infection, including longer course of diflucan (day 1, 4, and 7) or suppressive treatment weekly for 3-6 months. Pt desires week-long course at this time.  Also encouraged use of probiotic to restore normal vaginal awilda.   - GC/CT, HIV, treponema  collected today  - RTC in one year for annual exam or with concerns.

## 2018-12-05 NOTE — MR AVS SNAPSHOT
After Visit Summary   12/5/2018    Alva Joshi    MRN: [id number]           Patient Information     Date Of Birth          1990        Visit Information        Provider Department      12/5/2018 3:00 PM Lexa Frederick APRN CNM [facility]        Today's Diagnoses     Encounter for annual routine gynecological examination    -  1    Cervical cancer screening        Yeast infection of the vagina        Screening examination for venereal disease        Screening for human immunodeficiency virus           Follow-ups after your visit        Follow-up notes from your care team     Return in about 1 year (around 12/5/2019), or if symptoms worsen or fail to improve.      Who to contact     Please call your clinic at [phone] to:    Ask questions about your health    Make or cancel appointments    Discuss your medicines    Learn about your test results    Speak to your doctor            Additional Information About Your Visit        MyChart Information     [employer] gives you secure access to your electronic health record. If you see a primary care provider, you can also send messages to your care team and make appointments. If you have questions, please call your primary care clinic.  If you do not have a primary care provider, please call [phone] and they will assist you.      [employer] is an electronic gateway that provides easy, online access to your medical records. With [employer], you can request a clinic appointment, read your test results, renew a prescription or communicate with your care team.     To access your existing account, please contact your [facility] or call [phone] for assistance.        Care EveryWhere ID     This is your Care EveryWhere ID. This could be used by other organizations to access your [place] medical records  [id number]        Your Vitals Were     Pulse Height BMI (Body Mass Index)             103 1.524 m  (5') 53.55 kg/m2          Blood Pressure from Last 3 Encounters:   12/05/18 136/84   02/28/17 113/72   09/02/15 106/70    Weight from Last 3 Encounters:   12/05/18 124.4 kg (274 lb 3.2 oz)   10/19/18 123.5 kg (272 lb 3.2 oz)   02/28/17 124.3 kg (274 lb)              We Performed the Following     Chlamydia PCR (Clinic Collect)     Gonorrhea PCR     HIV Antigen Antibody Combo     Obtaining, preparing and conveyance of cervical or vaginal smear to laboratory.     Pap imaged thin layer screen reflex to HPV if ASCUS - recommended age 25 - 29 years     Treponema Abs w Reflex to RPR and Titer     Wet Prep (Collected in Clinic)     Wet Prep POCT          Today's Medication Changes          These changes are accurate as of 12/5/18 11:59 PM.  If you have any questions, ask your nurse or doctor.               Start taking these medicines.        Dose/Directions    fluconazole 150 MG tablet   Commonly known as:  DIFLUCAN   Used for:  Yeast infection of the vagina   Started by:  Lexa Frederick APRN CNM        Dose:  150 mg   Take 1 tablet (150 mg) by mouth once for 1 dose , take 2nd dose on day 4, and 3rd dose on day 7.   Quantity:  3 tablet   Refills:  1            Where to get your medicines      These medications were sent to [facility] [place], [place] - [address]  [address] [address]     Phone:  [phone]     fluconazole 150 MG tablet                Primary Care Provider Fax #    Physician No Ref-Primary [phone]       No address on file        Equal Access to Services     [facility] AH: Hadii caitie ku hadasho Sotameraali, waaxda luqadaha, qaybta kaalmada adeegyada, waxsarah beth de la cruz. So [facility] [phone].    ATENCIÓN: Si habla español, tiene a bowling disposición servicios gratuitos de asistencia lingüística. Llame al [phone].    We comply with applicable federal civil rights laws and [place] laws. We do not discriminate on the basis of race,  color, national origin, age, disability, sex, sexual orientation, or gender identity.            Thank you!     Thank you for choosing [facility]  for your care. Our goal is always to provide you with excellent care. Hearing back from our patients is one way we can continue to improve our services. Please take a few minutes to complete the written survey that you may receive in the mail after your visit with us. Thank you!             Your Updated Medication List - Protect others around you: Learn how to safely use, store and throw away your medicines at www.disposemymeds.org.          This list is accurate as of 12/5/18 11:59 PM.  Always use your most recent med list.                   Brand Name Dispense Instructions for use Diagnosis    benzonatate 100 MG capsule    TESSALON    42 capsule    Take 2 capsules (200 mg) by mouth 3 times daily as needed for cough    Cough       fluconazole 150 MG tablet    DIFLUCAN    3 tablet    Take 1 tablet (150 mg) by mouth once for 1 dose , take 2nd dose on day 4, and 3rd dose on day 7.    Yeast infection of the vagina       guaiFENesin-codeine 100-10 MG/5ML solution    ROBITUSSIN AC    120 mL    Take 5-10 mLs by mouth every 6 hours as needed for cough    Cough       MINOCYCLINE HCL PO      Take 50 mg by mouth 2 times daily        * MIRENA (52 MG) 20 MCG/24HR IUD   Generic drug:  levonorgestrel      1 each by Intrauterine route once        * levonorgestrel 20 MCG/24HR IUD    MIRENA     1 Device by Intrauterine route        * Notice:  This list has 2 medication(s) that are the same as other medications prescribed for you. Read the directions carefully, and ask your doctor or other care provider to review them with you.

## 2018-12-06 LAB
C TRACH DNA SPEC QL NAA+PROBE: NEGATIVE
HIV 1+2 AB+HIV1 P24 AG SERPL QL IA: NONREACTIVE
N GONORRHOEA DNA SPEC QL NAA+PROBE: NEGATIVE
SPECIMEN SOURCE: NORMAL
SPECIMEN SOURCE: NORMAL
T PALLIDUM AB SER QL: NONREACTIVE

## 2018-12-07 ASSESSMENT — ANXIETY QUESTIONNAIRES: GAD7 TOTAL SCORE: 0

## 2018-12-11 LAB
COPATH REPORT: NORMAL
PAP: NORMAL

## 2018-12-26 ENCOUNTER — ANCILLARY PROCEDURE (OUTPATIENT)
Dept: GENERAL RADIOLOGY | Facility: CLINIC | Age: 28
End: 2018-12-26
Attending: FAMILY MEDICINE

## 2018-12-26 ENCOUNTER — OFFICE VISIT (OUTPATIENT)
Dept: ORTHOPEDICS | Facility: CLINIC | Age: 28
End: 2018-12-26

## 2018-12-26 VITALS
SYSTOLIC BLOOD PRESSURE: 127 MMHG | DIASTOLIC BLOOD PRESSURE: 77 MMHG | WEIGHT: 274 LBS | HEART RATE: 120 BPM | BODY MASS INDEX: 53.79 KG/M2 | HEIGHT: 60 IN

## 2018-12-26 DIAGNOSIS — M25.562 ACUTE PAIN OF LEFT KNEE: Primary | ICD-10-CM

## 2018-12-26 DIAGNOSIS — M25.562 ACUTE PAIN OF LEFT KNEE: ICD-10-CM

## 2018-12-26 ASSESSMENT — MIFFLIN-ST. JEOR: SCORE: 1899.36

## 2018-12-26 NOTE — PROGRESS NOTES
"[facility]  Orthopedics  Chin Seymour MD  2018     Name: Alva Joshi  MRN: [id number]  Age: 28 year old  : 1990  Referring provider: Referred Self     Chief Complaint: Pain of the Left Knee     Date of Injury: 18    History of Present Illness:   Alva Joshi is a 28 year old female who presents today for evaluation of left knee pain sustained on 18 after [place] her left [place] Today, she notes that she endorses aching pain in the lateral and medial aspect of her left knee. She also notes that she experienced a significant amount of swelling at the time of the injury. She states that she feels her knee is \"loose.\" Pain is exacerbated with walking, flexion, and extension of her left leg. Pain is alleviated with rest, ice, and ibuprofen. Of note, she has not had any prior injuries or surgeries to her left knee.     Review of Systems:   A 10-point review of systems was obtained and is negative except for as noted in the HPI.     Medications:      benzonatate (TESSALON) 100 MG capsule, Take 2 capsules (200 mg) by mouth 3 times daily as needed for cough (Patient not taking: Reported on 2018), Disp: 42 capsule, Rfl: 3     guaiFENesin-codeine (ROBITUSSIN AC) 100-10 MG/5ML SOLN solution, Take 5-10 mLs by mouth every 6 hours as needed for cough (Patient not taking: Reported on 2018), Disp: 120 mL, Rfl: 1     levonorgestrel (MIRENA) 20 MCG/24HR IUD, 1 Device by Intrauterine route, Disp: , Rfl:      levonorgestrel (MIRENA) 20 MCG/24HR IUD, 1 each by Intrauterine route once, Disp: , Rfl:      MINOCYCLINE HCL PO, Take 50 mg by mouth 2 times daily, Disp: , Rfl:     Allergies:  Augmentin  Penicillins  Zithromax    Past Medical History:  Endometriosis  Gastro-eoesophageal reflux disease  Migraines  Numbness and tingling  PONV    Past Surgical History:  Arthroscopy Shoulder  ENT Surgery  Insert Intrauterine Device    Social History:  She denies tobacco use " and admits to alcohol use.     Family History:  Positive: Breast Cancer (maternal grandmother), Diabetes (paternal grandmother), Breast Cancer (great grandmother), Ovarian Cancer (paternal great aunt)    Physical Examination:  Blood pressure 127/77, pulse 120, height 1.524 m (5'), weight 124.3 kg (274 lb), not currently breastfeeding.     Patient is alert, No acute distress, pleasant and conversational.    Gait: nonantalgic. Normal heel toe gait.    Patient is able to perform two legged squat without difficulty.    Left knee:   Skin intact. No erythema or ecchymosis.  No effusion or soft tissue swelling.    Abrasion over the lateral knee well healing. No infection. A trace effusion but no soft tissue swell.     AROM: Zero to approximately 135  without restriction or reported pain.    Palpation: TTP diffusely over the lateral aspect of the knee including lateral joint line.     Special Tests:  Negative bounce test, negative forced flexion and negative Génesis's.  No ligamentous laxity or pain with valgus or varus stress.  Negative Lachman's, Anterior Drawer and Posterior Drawer     Full Isometric quad strength, extensor mechanism in place     Neurovascularly intact in the lower extremity    Hip and Ankle with full AROM and nontender      Imaging:   Radiographs of the left knee - 3 views (12/26/2018)  IMPRESSION: Question of subtle narrowing medial femoral tibial joint  space.    I have independently reviewed the above imaging studies; the results were discussed with the patient.     Assessment:   28 year old female with contusion to lateral knee, no structural injury suspected    Plan:     I recommended that she try icing and ace bandaging her knee to provide pressure to help with the swelling. She can continue with daily activities as tolerated, but modify it to avoid prolonged standing and walking. Follow up with me if pain is persistent or worsens.     Chin Seymour MD    Scribe Disclosure:   Ruddy NICHOLS, am  serving as a scribe to document services personally performed by Chin Seymour MD at this visit, based upon the provider's statements to me. All documentation has been reviewed by the aforementioned provider prior to being entered into the official medical record.

## 2018-12-26 NOTE — PROGRESS NOTES
"      SPORTS & ORTHOPEDIC WALK-IN VISIT 12/26/2018    Primary Care Physician:   [place] lateral side of L knee hit tree. Pain lateral and medial. Pain with flexion and extension. States that knee feels \"loose\". Did have a lot of swelling intiall after.     Reason for visit:     What part of your body is injured / painful?  left knee    What caused the injury /pain? Hit tree trunk    How long ago did your injury occur or pain begin? yesterday    What are your most bothersome symptoms? Pain    How would you characterize your symptom?  aching    What makes your symptoms better? Rest, Ice and Ibuprofen    What makes your symptoms worse? Walking    Have you been previously seen for this problem? No    Medical History:    Any recent changes to your medical history? No    Any new medication prescribed since last visit? No    Have you had surgery on this body part before? No    Social History:    Occupation: RN    Handedness: Right    Exercise: 2-3 days/week    Review of Systems:    Do you have fever, chills, weight loss? No    Do you have any vision problems? No    Do you have any chest pain or edema? No    Do you have any shortness of breath or wheezing?  No    Do you have stomach problems? No    Do you have any numbness or focal weakness? No    Do you have diabetes? No    Do you have problems with bleeding or clotting? No    Do you have an rashes or other skin lesions? No         "

## 2018-12-31 DIAGNOSIS — R30.0 DYSURIA: ICD-10-CM

## 2018-12-31 LAB
ALBUMIN UR-MCNC: 10 MG/DL
APPEARANCE UR: CLEAR
BACTERIA #/AREA URNS HPF: ABNORMAL /HPF
BILIRUB UR QL STRIP: NEGATIVE
COLOR UR AUTO: YELLOW
GLUCOSE UR STRIP-MCNC: NEGATIVE MG/DL
HGB UR QL STRIP: NEGATIVE
KETONES UR STRIP-MCNC: NEGATIVE MG/DL
LEUKOCYTE ESTERASE UR QL STRIP: ABNORMAL
MUCOUS THREADS #/AREA URNS LPF: PRESENT /LPF
NITRATE UR QL: NEGATIVE
PH UR STRIP: 6.5 PH (ref 5–7)
RBC #/AREA URNS AUTO: 1 /HPF (ref 0–2)
SOURCE: ABNORMAL
SP GR UR STRIP: 1.02 (ref 1–1.03)
SQUAMOUS #/AREA URNS AUTO: 3 /HPF (ref 0–1)
UROBILINOGEN UR STRIP-MCNC: NORMAL MG/DL (ref 0–2)
WBC #/AREA URNS AUTO: 8 /HPF (ref 0–5)

## 2018-12-31 PROCEDURE — 81001 URINALYSIS AUTO W/SCOPE: CPT | Performed by: OBSTETRICS & GYNECOLOGY

## 2018-12-31 PROCEDURE — 87086 URINE CULTURE/COLONY COUNT: CPT | Performed by: OBSTETRICS & GYNECOLOGY

## 2019-01-01 LAB
BACTERIA SPEC CULT: NORMAL
BACTERIA SPEC CULT: NORMAL
Lab: NORMAL
SPECIMEN SOURCE: NORMAL

## 2019-07-14 ENCOUNTER — HOSPITAL ENCOUNTER (EMERGENCY)
Facility: CLINIC | Age: 29
Discharge: HOME | End: 2019-07-14
Attending: EMERGENCY MEDICINE | Admitting: EMERGENCY MEDICINE
Payer: COMMERCIAL

## 2019-07-14 VITALS
RESPIRATION RATE: 16 BRPM | WEIGHT: 276.7 LBS | DIASTOLIC BLOOD PRESSURE: 81 MMHG | BODY MASS INDEX: 54.04 KG/M2 | SYSTOLIC BLOOD PRESSURE: 145 MMHG | OXYGEN SATURATION: 96 % | TEMPERATURE: 98.1 F

## 2019-07-14 DIAGNOSIS — S00.83XA CONTUSION OF FACE, INITIAL ENCOUNTER: ICD-10-CM

## 2019-07-14 DIAGNOSIS — S01.81XA FACIAL LACERATION, INITIAL ENCOUNTER: ICD-10-CM

## 2019-07-14 PROCEDURE — 99283 EMERGENCY DEPT VISIT LOW MDM: CPT | Mod: 25 | Performed by: EMERGENCY MEDICINE

## 2019-07-14 PROCEDURE — 12011 RPR F/E/E/N/L/M 2.5 CM/<: CPT | Performed by: EMERGENCY MEDICINE

## 2019-07-14 PROCEDURE — 99283 EMERGENCY DEPT VISIT LOW MDM: CPT | Performed by: EMERGENCY MEDICINE

## 2019-07-14 PROCEDURE — 27210282 ZZH ADHESIVE DERMABOND SKIN: Performed by: EMERGENCY MEDICINE

## 2019-07-14 PROCEDURE — 12011 RPR F/E/E/N/L/M 2.5 CM/<: CPT | Mod: Z6 | Performed by: EMERGENCY MEDICINE

## 2019-07-14 ASSESSMENT — ENCOUNTER SYMPTOMS
BACK PAIN: 0
SHORTNESS OF BREATH: 0
NUMBNESS: 0
NECK PAIN: 0
ABDOMINAL PAIN: 0
WEAKNESS: 0
VOMITING: 0
NAUSEA: 0
HEADACHES: 1

## 2019-07-14 NOTE — ED AVS SNAPSHOT
[facility], [place], Emergency Department  [address]  [place] [address]  Phone:  [phone]  Fax:  [phone]                                    Alva Joshi   MRN: [id number]    Department:  [facility], Emergency Department   Date of Visit:  7/14/2019           After Visit Summary Signature Page    I have received my discharge instructions, and my questions have been answered. I have discussed any challenges I see with this plan with the nurse or doctor.    ..........................................................................................................................................  Patient/Patient Representative Signature      ..........................................................................................................................................  Patient Representative Print Name and Relationship to Patient    ..................................................               ................................................  Date                                   Time    ..........................................................................................................................................  Reviewed by Signature/Title    ...................................................              ..............................................  Date                                               Time          22EPIC Rev 08/18

## 2019-07-15 NOTE — ED NOTES
Pt has a 5mm lac to the outer aspect of the mid upper eye lid.  Bleeding controlled.  FROM/CMS intact of the effected eyelid. Pt washed the wound with soap and water PTA.

## 2019-07-15 NOTE — DISCHARGE INSTRUCTIONS
Please make an appointment to follow up with Your Primary Care Provider in 3-4 days for wound check if you have any concerns.

## 2019-07-15 NOTE — ED PROVIDER NOTES
"    [facility] [place] EMERGENCY DEPARTMENT ([facility])     July 14, 2019    History     Chief Complaint   Patient presents with     Laceration     Laceration to (L) eyelid. Was tubing and hit something. Bleeding controlled     HPI  Alva Joshi is a 28 year old female with a past medical history significant for migraines who presents to the Emergency Department for evaluation of a facial laceration lateral to her left eyelid. Patient reports she was inner tubing behind a boat when she was pulled forward and hit her face on the plastic handle on the tube causing a laceration over the lateral left eyelid. She estimates this occurred 2 hours PTA. Patient denies any LOC, eye injury, neck or back pain, or current nausea. Patient states she has mild headache but believes it is due to her landing, and that she initially had nausea but it has gone away. Patient denies any numbness or weakness and states she is up to date on her tetanus shots. She denies any vomiting, chest pain, abdominal pain, or shortness of breath.  She denies vision changes or eye pain, denies foreign body sensation in the eye.  Denies drainage from the eye.  She believes her last tetanus shot was within the last 10 years.    I have reviewed the Medications, Allergies, Past Medical and Surgical History, and Social History in the [employer] system.    Past Medical History:   Diagnosis Date     Endometriosis      Gastro-oesophageal reflux disease      Migraines      Numbness and tingling     right shoulder \"Pins and Needles\"     PONV (postoperative nausea and vomiting)        Past Surgical History:   Procedure Laterality Date     ARTHROSCOPY SHOULDER Right 9/2/2015    Procedure: ARTHROSCOPY SHOULDER;  Surgeon: Rod Leiva MD;  Location: [facility] OR     ENT SURGERY      sinus surgery     ENT SURGERY      deviated septum fixed     INSERT INTRAUTERINE DEVICE  12/19/2016       Family History   Problem Relation Age of Onset     Breast Cancer Maternal " Grandmother      Diabetes Paternal Grandmother      Breast Cancer Other         great grandmother, great aunt- dx 40's, no genetic testing     Ovarian Cancer Other         paternal great aunt       Social History     Tobacco Use     Smoking status: Never Smoker     Smokeless tobacco: Never Used   Substance Use Topics     Alcohol use: Yes     Comment: socially     Current Facility-Administered Medications   Medication     fluorescein (FUL-MAURICE) ophthalmic strip 1 strip     Current Outpatient Medications   Medication     benzonatate (TESSALON) 100 MG capsule     guaiFENesin-codeine (ROBITUSSIN AC) 100-10 MG/5ML SOLN solution     levonorgestrel (MIRENA) 20 MCG/24HR IUD     levonorgestrel (MIRENA) 20 MCG/24HR IUD     MINOCYCLINE HCL PO        Allergies   Allergen Reactions     Augmentin Shortness Of Breath     Penicillins      Zithromax [Azithromycin] Diarrhea       Review of Systems   Respiratory: Negative for shortness of breath.    Cardiovascular: Negative for chest pain.   Gastrointestinal: Negative for abdominal pain, nausea and vomiting.   Musculoskeletal: Negative for back pain and neck pain.   Neurological: Positive for headaches. Negative for weakness and numbness.   All other systems reviewed and are negative.      Physical Exam   BP: 145/81  Heart Rate: 110  Temp: 98.1  F (36.7  C)  Resp: 16  Weight: 125.5 kg (276 lb 11.2 oz)  SpO2: 96 %      Physical Exam    GEN:  Well developed, no acute distress  HEENT: Pupils equally round and reactive to light, EOMI, Mucous membranes are moist.  There is a 1 cm laceration just lateral to the left eye.  This laceration is not full skin thickness, there are no foreign bodies in the wound, no active bleeding, and the wound edges are well approximated.  There is an abrasion on the left side of the forehead and there is ecchymosis overlying the lateral side of the left orbit with associated tenderness.  There is no periorbital swelling and no tenderness over the superior  aspect of the orbit.  Fluorescein exam of the left eye is negative for any uptake.  Musculoskeletal:  normal range of motion, no lower extremity swelling or calf tenderness  Neuro:  Alert and oriented X3, Follows commands, moving all extremities spontaneously   Skin:  Warm, dry    ED Course        Procedures       7:31 PM  The patient was seen and examined by Doris Judge MD in Room 04.         Critical Care time:  [facility] Procedure Note        Laceration Repair:    Performed by: Doris Judge  Authorized by: Doris Judge  Consent given by: Patient who states understanding of the procedure being performed after discussing the risks, benefits and alternatives.    Preparation: Patient was prepped and draped in usual sterile fashion.  Irrigation solution: saline    Body area:face  Laceration length: 1cm  Contamination: The wound is not contaminated.  Foreign bodies:none  Tendon involvement: none  Anesthesia: none    Debridement: none  Skin closure: Closed with Wound adhesive  Technique: Wound adhesive  Approximation: close  Approximation difficulty: simple    Patient tolerance: Patient tolerated the procedure well with no immediate complications.      Labs Ordered and Resulted from Time of ED Arrival Up to the Time of Departure from the ED - No data to display         Assessments & Plan (with Medical Decision Making)   Patient presents after an injury from tubing behind a boat.  There is a laceration just lateral to the left eye that was repaired with Dermabond.  There is some mild ecchymosis in the same area as well as an abrasion over the forehead.  No swelling around the orbit, I doubt acute facial fracture or orbital fracture.  Doubt intracranial hemorrhage or skull fracture.  Patient was advised on wound care and was advised to return to the emergency department if the wound has increased redness, swelling, drainage, or if she has fever, or other questions or concerns.   There is no evidence of injury to the eye itself.    I have reviewed the nursing notes.    I have reviewed the findings, diagnosis, plan and need for follow up with the patient.       Medication List      There are no discharge medications for this visit.         Final diagnoses:   Facial laceration, initial encounter   Contusion of face, initial encounter       7/14/2019   [facility], [place], EMERGENCY DEPARTMENT     Doris Judge MD  07/14/19 3606

## 2020-01-31 ENCOUNTER — TELEPHONE (OUTPATIENT)
Dept: OBGYN | Facility: CLINIC | Age: 30
End: 2020-01-31

## 2020-01-31 ENCOUNTER — HOSPITAL ENCOUNTER (EMERGENCY)
Facility: CLINIC | Age: 30
Discharge: HOME | End: 2020-01-31
Attending: EMERGENCY MEDICINE | Admitting: EMERGENCY MEDICINE
Payer: COMMERCIAL

## 2020-01-31 ENCOUNTER — APPOINTMENT (OUTPATIENT)
Dept: ULTRASOUND IMAGING | Facility: CLINIC | Age: 30
End: 2020-01-31
Attending: EMERGENCY MEDICINE
Payer: COMMERCIAL

## 2020-01-31 ENCOUNTER — APPOINTMENT (OUTPATIENT)
Dept: CT IMAGING | Facility: CLINIC | Age: 30
End: 2020-01-31
Attending: EMERGENCY MEDICINE
Payer: COMMERCIAL

## 2020-01-31 VITALS
RESPIRATION RATE: 16 BRPM | BODY MASS INDEX: 53.99 KG/M2 | HEART RATE: 100 BPM | HEIGHT: 60 IN | WEIGHT: 275 LBS | OXYGEN SATURATION: 99 % | DIASTOLIC BLOOD PRESSURE: 80 MMHG | TEMPERATURE: 98 F | SYSTOLIC BLOOD PRESSURE: 127 MMHG

## 2020-01-31 DIAGNOSIS — R10.2 PELVIC PAIN IN FEMALE: ICD-10-CM

## 2020-01-31 DIAGNOSIS — T83.32XA INTRAUTERINE DEVICE (IUD) MIGRATION, INITIAL ENCOUNTER: ICD-10-CM

## 2020-01-31 LAB
ALBUMIN SERPL-MCNC: 3.6 G/DL (ref 3.4–5)
ALBUMIN UR-MCNC: 10 MG/DL
ALP SERPL-CCNC: 75 U/L (ref 40–150)
ALT SERPL W P-5'-P-CCNC: 26 U/L (ref 0–50)
ANION GAP SERPL CALCULATED.3IONS-SCNC: 6 MMOL/L (ref 3–14)
APPEARANCE UR: ABNORMAL
AST SERPL W P-5'-P-CCNC: 13 U/L (ref 0–45)
BASOPHILS # BLD AUTO: 0 10E9/L (ref 0–0.2)
BASOPHILS NFR BLD AUTO: 0.2 %
BILIRUB SERPL-MCNC: 0.3 MG/DL (ref 0.2–1.3)
BILIRUB UR QL STRIP: NEGATIVE
BUN SERPL-MCNC: 9 MG/DL (ref 7–30)
CALCIUM SERPL-MCNC: 8.8 MG/DL (ref 8.5–10.1)
CHLORIDE SERPL-SCNC: 108 MMOL/L (ref 94–109)
CO2 SERPL-SCNC: 28 MMOL/L (ref 20–32)
COLOR UR AUTO: YELLOW
CREAT SERPL-MCNC: 0.82 MG/DL (ref 0.52–1.04)
DIFFERENTIAL METHOD BLD: NORMAL
EOSINOPHIL # BLD AUTO: 0.1 10E9/L (ref 0–0.7)
EOSINOPHIL NFR BLD AUTO: 1.3 %
ERYTHROCYTE [DISTWIDTH] IN BLOOD BY AUTOMATED COUNT: 12.8 % (ref 10–15)
GFR SERPL CREATININE-BSD FRML MDRD: >90 ML/MIN/{1.73_M2}
GLUCOSE SERPL-MCNC: 110 MG/DL (ref 70–99)
GLUCOSE UR STRIP-MCNC: NEGATIVE MG/DL
HCG SERPL QL: NEGATIVE
HCT VFR BLD AUTO: 40.5 % (ref 35–47)
HGB BLD-MCNC: 13.8 G/DL (ref 11.7–15.7)
HGB UR QL STRIP: NEGATIVE
IMM GRANULOCYTES # BLD: 0 10E9/L (ref 0–0.4)
IMM GRANULOCYTES NFR BLD: 0.1 %
KETONES UR STRIP-MCNC: NEGATIVE MG/DL
LEUKOCYTE ESTERASE UR QL STRIP: ABNORMAL
LYMPHOCYTES # BLD AUTO: 2.8 10E9/L (ref 0.8–5.3)
LYMPHOCYTES NFR BLD AUTO: 33.1 %
MCH RBC QN AUTO: 30.4 PG (ref 26.5–33)
MCHC RBC AUTO-ENTMCNC: 34.1 G/DL (ref 31.5–36.5)
MCV RBC AUTO: 89 FL (ref 78–100)
MONOCYTES # BLD AUTO: 0.3 10E9/L (ref 0–1.3)
MONOCYTES NFR BLD AUTO: 3.6 %
MUCOUS THREADS #/AREA URNS LPF: PRESENT /LPF
NEUTROPHILS # BLD AUTO: 5.1 10E9/L (ref 1.6–8.3)
NEUTROPHILS NFR BLD AUTO: 61.7 %
NITRATE UR QL: NEGATIVE
NRBC # BLD AUTO: 0 10*3/UL
NRBC BLD AUTO-RTO: 0 /100
PH UR STRIP: 6.5 PH (ref 5–7)
PLATELET # BLD AUTO: 270 10E9/L (ref 150–450)
POTASSIUM SERPL-SCNC: 3.5 MMOL/L (ref 3.4–5.3)
PROT SERPL-MCNC: 7.2 G/DL (ref 6.8–8.8)
RBC # BLD AUTO: 4.54 10E12/L (ref 3.8–5.2)
RBC #/AREA URNS AUTO: 3 /HPF (ref 0–2)
SODIUM SERPL-SCNC: 142 MMOL/L (ref 133–144)
SOURCE: ABNORMAL
SP GR UR STRIP: 1.03 (ref 1–1.03)
SPECIMEN SOURCE: NORMAL
SQUAMOUS #/AREA URNS AUTO: 9 /HPF (ref 0–1)
UROBILINOGEN UR STRIP-MCNC: NORMAL MG/DL (ref 0–2)
WBC # BLD AUTO: 8.3 10E9/L (ref 4–11)
WBC #/AREA URNS AUTO: 4 /HPF (ref 0–5)
WET PREP SPEC: NORMAL

## 2020-01-31 PROCEDURE — 80053 COMPREHEN METABOLIC PANEL: CPT | Performed by: EMERGENCY MEDICINE

## 2020-01-31 PROCEDURE — 25800030 ZZH RX IP 258 OP 636: Performed by: EMERGENCY MEDICINE

## 2020-01-31 PROCEDURE — 93976 VASCULAR STUDY: CPT | Mod: XS

## 2020-01-31 PROCEDURE — 96375 TX/PRO/DX INJ NEW DRUG ADDON: CPT

## 2020-01-31 PROCEDURE — 74176 CT ABD & PELVIS W/O CONTRAST: CPT

## 2020-01-31 PROCEDURE — 87210 SMEAR WET MOUNT SALINE/INK: CPT | Performed by: EMERGENCY MEDICINE

## 2020-01-31 PROCEDURE — 99285 EMERGENCY DEPT VISIT HI MDM: CPT | Mod: Z6 | Performed by: EMERGENCY MEDICINE

## 2020-01-31 PROCEDURE — 99285 EMERGENCY DEPT VISIT HI MDM: CPT | Mod: 25

## 2020-01-31 PROCEDURE — 87491 CHLMYD TRACH DNA AMP PROBE: CPT | Performed by: EMERGENCY MEDICINE

## 2020-01-31 PROCEDURE — 87591 N.GONORRHOEAE DNA AMP PROB: CPT | Performed by: EMERGENCY MEDICINE

## 2020-01-31 PROCEDURE — 96374 THER/PROPH/DIAG INJ IV PUSH: CPT | Mod: 59

## 2020-01-31 PROCEDURE — 25000128 H RX IP 250 OP 636: Performed by: EMERGENCY MEDICINE

## 2020-01-31 PROCEDURE — 84703 CHORIONIC GONADOTROPIN ASSAY: CPT | Performed by: EMERGENCY MEDICINE

## 2020-01-31 PROCEDURE — 58301 REMOVE INTRAUTERINE DEVICE: CPT

## 2020-01-31 PROCEDURE — 81001 URINALYSIS AUTO W/SCOPE: CPT | Performed by: EMERGENCY MEDICINE

## 2020-01-31 PROCEDURE — 85025 COMPLETE CBC W/AUTO DIFF WBC: CPT | Performed by: EMERGENCY MEDICINE

## 2020-01-31 PROCEDURE — 96376 TX/PRO/DX INJ SAME DRUG ADON: CPT | Mod: 59

## 2020-01-31 RX ORDER — MORPHINE SULFATE 4 MG/ML
4 INJECTION, SOLUTION INTRAMUSCULAR; INTRAVENOUS ONCE
Status: COMPLETED | OUTPATIENT
Start: 2020-01-31 | End: 2020-01-31

## 2020-01-31 RX ORDER — KETOROLAC TROMETHAMINE 30 MG/ML
30 INJECTION, SOLUTION INTRAMUSCULAR; INTRAVENOUS ONCE
Status: COMPLETED | OUTPATIENT
Start: 2020-01-31 | End: 2020-01-31

## 2020-01-31 RX ORDER — MORPHINE SULFATE 4 MG/ML
2 INJECTION, SOLUTION INTRAMUSCULAR; INTRAVENOUS ONCE
Status: COMPLETED | OUTPATIENT
Start: 2020-01-31 | End: 2020-01-31

## 2020-01-31 RX ORDER — ONDANSETRON 2 MG/ML
4 INJECTION INTRAMUSCULAR; INTRAVENOUS EVERY 30 MIN PRN
Status: DISCONTINUED | OUTPATIENT
Start: 2020-01-31 | End: 2020-01-31 | Stop reason: HOSPADM

## 2020-01-31 RX ADMIN — MORPHINE SULFATE 2 MG: 4 INJECTION INTRAVENOUS at 15:46

## 2020-01-31 RX ADMIN — MORPHINE SULFATE 4 MG: 4 INJECTION INTRAVENOUS at 11:40

## 2020-01-31 RX ADMIN — SODIUM CHLORIDE 1000 ML: 9 INJECTION, SOLUTION INTRAVENOUS at 11:40

## 2020-01-31 RX ADMIN — KETOROLAC TROMETHAMINE 30 MG: 30 INJECTION, SOLUTION INTRAMUSCULAR at 15:47

## 2020-01-31 RX ADMIN — ONDANSETRON 4 MG: 2 INJECTION INTRAMUSCULAR; INTRAVENOUS at 11:40

## 2020-01-31 ASSESSMENT — ENCOUNTER SYMPTOMS
COLOR CHANGE: 0
SHORTNESS OF BREATH: 0
EYE REDNESS: 0
NECK STIFFNESS: 0
ARTHRALGIAS: 0
ABDOMINAL PAIN: 1
HEADACHES: 0
CONFUSION: 0
DIFFICULTY URINATING: 0
FEVER: 0
NAUSEA: 1

## 2020-01-31 ASSESSMENT — MIFFLIN-ST. JEOR: SCORE: 1898.89

## 2020-01-31 NOTE — TELEPHONE ENCOUNTER
Received call from Alva stating about 1/2 hour ago she was walking and had a sudden onset of sharp pain in her low right pelvic area. It is present when she stands or sits up straight and is so severe it makes her nauseous. It is better if she is hunched over like in fetal position.  She is wondering if it is a ruptured cyst as she has had this in the past and it feels similar.  Of note, has IUD in place.  Last intercourse was 3 weeks ago but had period last week.    Advised she go to emergency room for evaluation. She agreed with plan.

## 2020-01-31 NOTE — ED NOTES
Pt states she has intense pain in RLQ, some pain radiating to the back, but majority in the front. Pt states pain started at 0930 this morning and has gotten worse. Pt states she has mild nausea d/t pain. No HA or dizziness.  Pt did say she had surgery several years ago where part of one of her ovaries was removed. Pt cannot remember which side this was on.

## 2020-01-31 NOTE — ED AVS SNAPSHOT
[facility], [place], Emergency Department  [address]  [place] [address]  Phone:  [phone]  Fax:  [phone]                                    Alva Joshi   MRN: [id number]    Department:  [facility], Emergency Department   Date of Visit:  1/31/2020           After Visit Summary Signature Page    I have received my discharge instructions, and my questions have been answered. I have discussed any challenges I see with this plan with the nurse or doctor.    ..........................................................................................................................................  Patient/Patient Representative Signature      ..........................................................................................................................................  Patient Representative Print Name and Relationship to Patient    ..................................................               ................................................  Date                                   Time    ..........................................................................................................................................  Reviewed by Signature/Title    ...................................................              ..............................................  Date                                               Time          22EPIC Rev 08/18

## 2020-01-31 NOTE — DISCHARGE INSTRUCTIONS
Please schedule an appointment for replacement of Mirena IUD in 1-2 weeks: [phone]    Dr. Elisabeth Alonso or any other one of her partners at [facility]    Take ibuprofen 600 mg every 6 hours with food.  You may also take doses of acetaminophen in between doses of ibuprofen as needed for discomfort.  Drink plenty of fluids.    Return to the emergency department if fever, vomiting, worsening symptoms, or other concerns.

## 2020-01-31 NOTE — CONSULTS
"Gynecology Consult Note    Patient Summary:  Alva Joshi is a 29 year old female seen at the request of Dr. Oh of the Emergency Department.    HPI: Alva Joshi is a 29 year old who presented with acute onset of RLQ pain that started this morning. Tried taking ibuprofen without relief. LMP approximately 1 week ago. She had a Mirena IUD placed 3 years ago. Evaluation in ED was notable for malpositioned IUD in lower uterine segment and cervix.    PMH:   Past Medical History:   Diagnosis Date     Endometriosis      Gastro-oesophageal reflux disease      Migraines      Numbness and tingling     right shoulder \"Pins and Needles\"     PONV (postoperative nausea and vomiting)      PSHx:   Past Surgical History:   Procedure Laterality Date     ARTHROSCOPY SHOULDER Right 2015    Procedure: ARTHROSCOPY SHOULDER;  Surgeon: Rod Leiva MD;  Location: [facility] OR     ENT SURGERY      sinus surgery     ENT SURGERY      deviated septum fixed     INSERT INTRAUTERINE DEVICE  2016     Medications:   Current Facility-Administered Medications   Medication     ondansetron (ZOFRAN) injection 4 mg     Current Outpatient Medications   Medication     benzonatate (TESSALON) 100 MG capsule     guaiFENesin-codeine (ROBITUSSIN AC) 100-10 MG/5ML SOLN solution     levonorgestrel (MIRENA) 20 MCG/24HR IUD     levonorgestrel (MIRENA) 20 MCG/24HR IUD     MINOCYCLINE HCL PO     No current facility-administered medications on file prior to encounter.   benzonatate (TESSALON) 100 MG capsule, Take 2 capsules (200 mg) by mouth 3 times daily as needed for cough (Patient not taking: Reported on 2018)  [] fluconazole (DIFLUCAN) 150 MG tablet, Take 1 tablet (150 mg) by mouth once for 1 dose , take 2nd dose on day 4, and 3rd dose on day 7.  guaiFENesin-codeine (ROBITUSSIN AC) 100-10 MG/5ML SOLN solution, Take 5-10 mLs by mouth every 6 hours as needed for cough (Patient not taking: Reported on 2018)  levonorgestrel " (MIRENA) 20 MCG/24HR IUD, 1 Device by Intrauterine route  levonorgestrel (MIRENA) 20 MCG/24HR IUD, 1 each by Intrauterine route once  MINOCYCLINE HCL PO, Take 50 mg by mouth 2 times daily        Allergies:   Allergies   Allergen Reactions     Augmentin Shortness Of Breath     Penicillins      Zithromax [Azithromycin] Diarrhea     Social History:   Social History     Socioeconomic History     Marital status: Single     Spouse name: Not on file     Number of children: Not on file     Years of education: Not on file     Highest education level: Not on file   Occupational History     Not on file   Social Needs     Financial resource strain: Not on file     Food insecurity:     Worry: Not on file     Inability: Not on file     Transportation needs:     Medical: Not on file     Non-medical: Not on file   Tobacco Use     Smoking status: Never Smoker     Smokeless tobacco: Never Used   Substance and Sexual Activity     Alcohol use: Yes     Comment: socially     Drug use: No     Sexual activity: Yes     Partners: Male     Birth control/protection: I.U.D.   Lifestyle     Physical activity:     Days per week: Not on file     Minutes per session: Not on file     Stress: Not on file   Relationships     Social connections:     Talks on phone: Not on file     Gets together: Not on file     Attends Religion service: Not on file     Active member of club or organization: Not on file     Attends meetings of clubs or organizations: Not on file     Relationship status: Not on file     Intimate partner violence:     Fear of current or ex partner: Not on file     Emotionally abused: Not on file     Physically abused: Not on file     Forced sexual activity: Not on file   Other Topics Concern     Not on file   Social History Narrative     Not on file     Social History     Socioeconomic History     Marital status: Single     Spouse name: None     Number of children: None     Years of education: None     Highest education level: None    Occupational History     None   Social Needs     Financial resource strain: None     Food insecurity:     Worry: None     Inability: None     Transportation needs:     Medical: None     Non-medical: None   Tobacco Use     Smoking status: Never Smoker     Smokeless tobacco: Never Used   Substance and Sexual Activity     Alcohol use: Yes     Comment: socially     Drug use: No     Sexual activity: Yes     Partners: Male     Birth control/protection: I.U.D.   Lifestyle     Physical activity:     Days per week: None     Minutes per session: None     Stress: None   Relationships     Social connections:     Talks on phone: None     Gets together: None     Attends Buddhist service: None     Active member of club or organization: None     Attends meetings of clubs or organizations: None     Relationship status: None     Intimate partner violence:     Fear of current or ex partner: None     Emotionally abused: None     Physically abused: None     Forced sexual activity: None   Other Topics Concern     None   Social History Narrative     None       Physical Exam:   Vitals:    01/31/20 1019   BP: (!) 167/90   Pulse: 101   Resp: 16   Temp: 98.5  F (36.9  C)   TempSrc: Oral   SpO2: 98%   Weight: 124.7 kg (275 lb)   Height: 1.524 m (5')      Gen: sitting in bed, NAD  CV: well-perfused  Pulm: breathing comfortably on room air    IUD Removal Procedure  Graves speculum placed with adequate visualization of nulliparous cervix without any lesions or masses. IUD strings visualized and grasped with ring forceps. Mirena IUD removed. It was examined and noted to be intact.    Labs:  Beta HCG neg  Wet prep neg  WBC 8.3  Hgb 13.8    Imaging:  Pelvic US 1/31  FINDINGS:  Transvaginal images were performed to better evaluate the  patient's uterus, ovaries and endometrial stripe.     The uterus is normal in size measuring 7.8 x 3.6 x 3.6 cm. No fibroids  are evident. Endometrial stripe measures 8 mm and is normal for  patient's age. IUD is  present within the lower uterine segment and  cervical canal. The right ovary is normal measuring 3.5 x 2.8 x 2.2  cm. The left ovary is obscured by bowel gas. Right ovary demonstrates  normal color Doppler flow. No adnexal masses are present. Small amount  of probable physiologic free pelvic fluid is present.                                                                   IMPRESSION:   1. Uterus and right ovary are unremarkable. Small amount of free  pelvic fluid is likely physiologic. Left ovary obscured by bowel gas.  2. IUD is inferiorly placed in the lower uterine segment and cervical  canal. Adjust as clinically warranted.    A&P: 29 year old presenting with RLQ pain and found to have malpositioned IUD in lower uterine segment/cervix. No other concerning gynecologic findings on pelvic US or CT A/P. Mirena IUD removed intact in ED. Discussed use of another form of contraceptive until clinic appointment for replacement of Mirena IUD in 1-2 weeks.    Thank you for this consult. Please do not hesitate to contact us with concerns or questions. Patient was seen with Dr. Alonso.    Koko Figueredo MD  OB/GYN Resident, PGY-4  1/31/2020    OBGYN Attending Addendum     I, Elisabeth Alonso, saw Alva Joshi with the resident, Dr. Figueredo, and agree with their findings and plan of care as documented in the above note.    I personally reviewed vitals, meds, labs, imaging.     [place] findings: Alva is a 30 y/o G0 with RLQ pelvic pain from a partially expelled IUD. Removal performed in emergency room with improvement in pain. Alva will follow up with me in clinic in 1-2 weeks for replacement of Mirena. Plan for backup contraceptives until then.     I agree with the plan for discharge home.    Elisabeth Alonso MD, MSCI  Date of Service: 2/3/2020

## 2020-01-31 NOTE — ED PROVIDER NOTES
History     Chief Complaint   Patient presents with     Abdominal Pain     Right Lower quadrant abdominal pain, sudden onset this morning, sharp,aching pain. Hx: Ovarian cyst rupture .       BRITTNI Joshi is a 29 year old female who presents to the emergency department with right lower quadrant abdominal pain.  Patient reports sudden onset of pain approximately 1 hour ago.  She describes it as a stabbing pain that radiates to the back.  She had some associated nausea but no vomiting.  She denies any diarrhea or constipation.  She denies any dysuria, urgency, or frequency.  She denies fever.  No anorexia.  She has a history of ovarian cyst as well as ovarian cyst rupture in the past.  Her last menstrual period was approximately 1 week ago.  She denies a history of abdominal surgery.  She states that she did take 600 mg of ibuprofen before coming to the emergency department.    I have reviewed the Medications, Allergies, Past Medical and Surgical History, and Social History in the Epic system.    Review of Systems   Constitutional: Negative for fever.   HENT: Negative for congestion.    Eyes: Negative for redness.   Respiratory: Negative for shortness of breath.    Cardiovascular: Negative for chest pain.   Gastrointestinal: Positive for abdominal pain and nausea.   Genitourinary: Negative for difficulty urinating.   Musculoskeletal: Negative for arthralgias and neck stiffness.   Skin: Negative for color change.   Neurological: Negative for headaches.   Psychiatric/Behavioral: Negative for confusion.   All other systems reviewed and are negative.      Physical Exam   BP: (!) 167/90  Pulse: 101  Temp: 98.5  F (36.9  C)  Resp: 16  Height: 152.4 cm (5')  Weight: 124.7 kg (275 lb)  SpO2: 98 %      Physical Exam  Vitals signs and nursing note reviewed.   Constitutional:       General: She is not in acute distress.     Appearance: She is well-developed. She is not diaphoretic.   HENT:      Head: Normocephalic and  atraumatic.      Mouth/Throat:      Pharynx: No oropharyngeal exudate.   Eyes:      General: No scleral icterus.     Pupils: Pupils are equal, round, and reactive to light.   Cardiovascular:      Rate and Rhythm: Normal rate and regular rhythm.      Heart sounds: Normal heart sounds.   Pulmonary:      Effort: Pulmonary effort is normal. No respiratory distress.      Breath sounds: Normal breath sounds.   Abdominal:      General: Bowel sounds are normal.      Palpations: Abdomen is soft.      Tenderness: There is abdominal tenderness. There is no guarding or rebound.       Genitourinary:     Vagina: Normal.      Uterus: Normal.       Adnexa:         Right: Tenderness present.         Left: No mass or tenderness.     Musculoskeletal:         General: No tenderness.   Skin:     General: Skin is warm.      Findings: No rash.   Neurological:      General: No focal deficit present.      Mental Status: She is alert.         ED Course        Procedures            Critical Care time:       Results for orders placed or performed during the hospital encounter of 01/31/20   US Pelvic Complete w Transvaginal & Abd/Pel Duplex Limited     Status: None    Narrative    ULTRASOUND PELVIS COMPLETE WITH TRANSVAGINAL AND DOPPLER LIMITED  1/31/2020 1:30 PM     HISTORY: Right adnexal pain.  Evaluate for torsion.     FINDINGS:  Transvaginal images were performed to better evaluate the  patient's uterus, ovaries and endometrial stripe.    The uterus is normal in size measuring 7.8 x 3.6 x 3.6 cm. No fibroids  are evident. Endometrial stripe measures 8 mm and is normal for  patient's age. IUD is present within the lower uterine segment and  cervical canal. The right ovary is normal measuring 3.5 x 2.8 x 2.2  cm. The left ovary is obscured by bowel gas. Right ovary demonstrates  normal color Doppler flow. No adnexal masses are present. Small amount  of probable physiologic free pelvic fluid is present.      Impression    IMPRESSION:   1.  Uterus and right ovary are unremarkable. Small amount of free  pelvic fluid is likely physiologic. Left ovary obscured by bowel gas.  2. IUD is inferiorly placed in the lower uterine segment and cervical  canal. Adjust as clinically warranted.    XIOMY CHRISTIANSON MD   CT Abdomen Pelvis w/o Contrast     Status: None    Narrative    CT ABDOMEN AND PELVIS WITHOUT CONTRAST   1/31/2020 2:37 PM     HISTORY:  Right pelvic pain, hematuria. Evaluate for ureterolithiasis.    TECHNIQUE: Noncontrast CT abdomen and pelvis was performed. Radiation  dose for this scan was reduced using automated exposure control,  adjustment of the mA and/or kV according to patient size, or iterative  reconstruction technique.    COMPARISON: None.    FINDINGS:  No obstructing ureter stone seen on either side. No  hydronephrosis. 0.2 cm stone within the right kidney, series 2 image  48. Another 0.2 cm stone within the right mid kidney, image 45.    The unenhanced liver, gallbladder, adrenals, spleen, and pancreas show  no acute abnormalities. Normal appendix. No acute bowel abnormality.    IUD is low in position at the lower uterine segment and cervical  levels. The arms of the IUD may be intramural, for instance series 2  images 100 and 101.  No free air.      Impression    IMPRESSION:  1. No acute abnormality is seen.  2. Tiny nonobstructing stones within the right kidney.  3. IUD low in position and may be partially intramural at the lower  uterus.    JEREMY ARREOLA MD   CBC with platelets differential     Status: None   Result Value Ref Range    WBC 8.3 4.0 - 11.0 10e9/L    RBC Count 4.54 3.8 - 5.2 10e12/L    Hemoglobin 13.8 11.7 - 15.7 g/dL    Hematocrit 40.5 35.0 - 47.0 %    MCV 89 78 - 100 fl    MCH 30.4 26.5 - 33.0 pg    MCHC 34.1 31.5 - 36.5 g/dL    RDW 12.8 10.0 - 15.0 %    Platelet Count 270 150 - 450 10e9/L    Diff Method Automated Method     % Neutrophils 61.7 %    % Lymphocytes 33.1 %    % Monocytes 3.6 %    % Eosinophils 1.3 %    % Basophils  0.2 %    % Immature Granulocytes 0.1 %    Nucleated RBCs 0 0 /100    Absolute Neutrophil 5.1 1.6 - 8.3 10e9/L    Absolute Lymphocytes 2.8 0.8 - 5.3 10e9/L    Absolute Monocytes 0.3 0.0 - 1.3 10e9/L    Absolute Eosinophils 0.1 0.0 - 0.7 10e9/L    Absolute Basophils 0.0 0.0 - 0.2 10e9/L    Abs Immature Granulocytes 0.0 0 - 0.4 10e9/L    Absolute Nucleated RBC 0.0    Comprehensive metabolic panel     Status: Abnormal   Result Value Ref Range    Sodium 142 133 - 144 mmol/L    Potassium 3.5 3.4 - 5.3 mmol/L    Chloride 108 94 - 109 mmol/L    Carbon Dioxide 28 20 - 32 mmol/L    Anion Gap 6 3 - 14 mmol/L    Glucose 110 (H) 70 - 99 mg/dL    Urea Nitrogen 9 7 - 30 mg/dL    Creatinine 0.82 0.52 - 1.04 mg/dL    GFR Estimate >90 >60 mL/min/[1.73_m2]    GFR Estimate If Black >90 >60 mL/min/[1.73_m2]    Calcium 8.8 8.5 - 10.1 mg/dL    Bilirubin Total 0.3 0.2 - 1.3 mg/dL    Albumin 3.6 3.4 - 5.0 g/dL    Protein Total 7.2 6.8 - 8.8 g/dL    Alkaline Phosphatase 75 40 - 150 U/L    ALT 26 0 - 50 U/L    AST 13 0 - 45 U/L   HCG qualitative Blood     Status: None   Result Value Ref Range    HCG Qualitative Serum Negative NEG^Negative   UA with Microscopic     Status: Abnormal   Result Value Ref Range    Color Urine Yellow     Appearance Urine Slightly Cloudy     Glucose Urine Negative NEG^Negative mg/dL    Bilirubin Urine Negative NEG^Negative    Ketones Urine Negative NEG^Negative mg/dL    Specific Gravity Urine 1.026 1.003 - 1.035    Blood Urine Negative NEG^Negative    pH Urine 6.5 5.0 - 7.0 pH    Protein Albumin Urine 10 (A) NEG^Negative mg/dL    Urobilinogen mg/dL Normal 0.0 - 2.0 mg/dL    Nitrite Urine Negative NEG^Negative    Leukocyte Esterase Urine Trace (A) NEG^Negative    Source Urine     WBC Urine 4 0 - 5 /HPF    RBC Urine 3 (H) 0 - 2 /HPF    Squamous Epithelial /HPF Urine 9 (H) 0 - 1 /HPF    Mucous Urine Present (A) NEG^Negative /LPF   Wet prep     Status: None   Result Value Ref Range    Specimen Description Cervix     Wet  Prep No clue cells seen     Wet Prep Moderate  PMNs seen       Wet Prep No yeast seen     Wet Prep No motile Trichomonas seen       Seen by gynecology.  IUD removed.    Assessments & Plan (with Medical Decision Making)   29 year old female who presents to the emergency part with acute onset of right-sided pelvic pain.  Pelvic ultrasound does not reveal any right-sided ovarian pathology.  Her IUD does appear low in the lower uterine segment and perhaps into the cervix.  To the acuity of onset of symptoms, a CT abdomen/pelvis stone protocol was subsequently obtained which did not reveal any ureterolithiasis or hydronephrosis although she does have a small right-sided kidney stone.  Her IUD does appear low and perhaps intramural on CT scan.  The patient's wet prep is negative.  Her urinalysis does not appear infected.  The made of her labs are normal.  Due to her ongoing symptoms, the appearance of her IUD was discussed with gynecology who evaluated the patient in the emergency department.  Patient's IUD was removed by gynecology.  She will follow-up in the clinic in 1 to 2 weeks for IUD replacement.    I have reviewed the nursing notes.    I have reviewed the findings, diagnosis, plan and need for follow up with the patient.    Discharge Medication List as of 1/31/2020  4:31 PM          Final diagnoses:   Pelvic pain in female   Intrauterine device (IUD) migration, initial encounter (H)       1/31/2020   [facility], [place], EMERGENCY DEPARTMENT     Hitesh Oh MD  01/31/20 0340

## 2020-01-31 NOTE — ED TRIAGE NOTES
Right Lower quadrant abdominal pain, sudden onset this morning, sharp,aching pain. Hx: Ovarian cyst rupture, had surgery was 3 years ago. Patient describes pain worst when sitting down. Patient does have an IUD and was wondering if this is causing the pain too.

## 2020-02-02 LAB
C TRACH DNA SPEC QL NAA+PROBE: NEGATIVE
N GONORRHOEA DNA SPEC QL NAA+PROBE: NEGATIVE
SPECIMEN SOURCE: NORMAL
SPECIMEN SOURCE: NORMAL

## 2020-02-03 ENCOUNTER — PATIENT OUTREACH (OUTPATIENT)
Dept: CARE COORDINATION | Facility: CLINIC | Age: 30
End: 2020-02-03

## 2020-02-25 ENCOUNTER — OFFICE VISIT (OUTPATIENT)
Dept: OBGYN | Facility: CLINIC | Age: 30
End: 2020-02-25
Attending: OBSTETRICS & GYNECOLOGY
Payer: COMMERCIAL

## 2020-02-25 VITALS — WEIGHT: 270.5 LBS | HEIGHT: 60 IN | BODY MASS INDEX: 53.11 KG/M2

## 2020-02-25 DIAGNOSIS — Z30.430 ENCOUNTER FOR IUD INSERTION: Primary | ICD-10-CM

## 2020-02-25 DIAGNOSIS — Z80.3 FAMILY HISTORY OF MALIGNANT NEOPLASM OF BREAST: ICD-10-CM

## 2020-02-25 PROCEDURE — 58300 INSERT INTRAUTERINE DEVICE: CPT | Mod: ZF | Performed by: OBSTETRICS & GYNECOLOGY

## 2020-02-25 PROCEDURE — 25000128 H RX IP 250 OP 636: Mod: ZF | Performed by: OBSTETRICS & GYNECOLOGY

## 2020-02-25 RX ADMIN — LEVONORGESTREL 20 MCG: 52 INTRAUTERINE DEVICE INTRAUTERINE at 15:23

## 2020-02-25 ASSESSMENT — PATIENT HEALTH QUESTIONNAIRE - PHQ9
5. POOR APPETITE OR OVEREATING: NOT AT ALL
SUM OF ALL RESPONSES TO PHQ QUESTIONS 1-9: 3

## 2020-02-25 ASSESSMENT — ANXIETY QUESTIONNAIRES
5. BEING SO RESTLESS THAT IT IS HARD TO SIT STILL: NOT AT ALL
2. NOT BEING ABLE TO STOP OR CONTROL WORRYING: SEVERAL DAYS
GAD7 TOTAL SCORE: 4
7. FEELING AFRAID AS IF SOMETHING AWFUL MIGHT HAPPEN: SEVERAL DAYS
6. BECOMING EASILY ANNOYED OR IRRITABLE: NOT AT ALL
1. FEELING NERVOUS, ANXIOUS, OR ON EDGE: SEVERAL DAYS
3. WORRYING TOO MUCH ABOUT DIFFERENT THINGS: SEVERAL DAYS

## 2020-02-25 ASSESSMENT — MIFFLIN-ST. JEOR: SCORE: 1878.48

## 2020-02-25 NOTE — LETTER
"2/25/2020       RE: Alva Joshi  [address] [place] [address]     Dear Colleague,    Thank you for referring your patient, Alva Joshi, to the [facility] at [facility]. Please see a copy of my visit note below.    [facility]  Gynecology Problem Visit    SUBJECTIVE    Alva Joshi is a 29 year old G0 who is here for IUD insertion. [place]/20, [place] [facility] [place] and found to have a partially expelled IUD. She presents today for insertion of a new IUD. Her previous IUD was placed in the OR and so she is a bit nervous to have it done without sedation.     Alva has a history of very heavy, painful periods. She first tried OCPs to manage her cycles. In 2009, she was found to have an ovarian cyst, and had a laparoscopic cystectomy, and at that time was also found to have endometriosis. In 2012, she had a second LSC \"clean out\" for endometriosis, and at that time switched to the Mirena IUD. She had a third LSC \"clean out\" in 2016 and her Mirena IUD was removed/replaced. She feels her periods are regular/monthly, but lighter and tolerable. She does not have pain outside of periods with the Mirena IUD. Since the Mirena was removed, she had one very heavy menses but is otherwise feeling well.    Her LMP is: Patient's last menstrual period was 02/11/2020.    PAST MEDICAL HISTORY  Past Medical History:   Diagnosis Date     Endometriosis      Gastro-oesophageal reflux disease      Migraines      Numbness and tingling     right shoulder \"Pins and Needles\"     PONV (postoperative nausea and vomiting)        MEDICATIONS  Current Outpatient Medications   Medication     benzonatate (TESSALON) 100 MG capsule     guaiFENesin-codeine (ROBITUSSIN AC) 100-10 MG/5ML SOLN solution     levonorgestrel (MIRENA) 20 MCG/24HR IUD     levonorgestrel (MIRENA) 20 MCG/24HR IUD     MINOCYCLINE HCL PO     No current " "facility-administered medications for this visit.      Past Surgical History:   Procedure Laterality Date     ARTHROSCOPY SHOULDER Right 9/2/2015    Procedure: ARTHROSCOPY SHOULDER;  Surgeon: Rod Leiva MD;  Location: [facility] OR     ENT SURGERY      sinus surgery     ENT SURGERY      deviated septum fixed     INSERT INTRAUTERINE DEVICE  12/19/2016    states also had a LSC \"pelvic clean out\" for endometriosis at this time - per op note in care everywhere, minimal endo seen/fulgarized     LAPAROSCOPIC CYSTECTOMY OVARIAN (BENIGN)  2009    ovarian cystectomy, diagnosed with endometriosis     LAPAROSCOPY  2012    had a LSC \"pelvic clean out\" for endometriosis and mirena IUD insertion     ALLERGIES  Allergies   Allergen Reactions     Augmentin Shortness Of Breath     Penicillins      Zithromax [Azithromycin] Diarrhea     FAMILY HISTORY  Family History   Problem Relation Age of Onset     Breast Cancer Maternal Grandmother      Diabetes Paternal Grandmother      Breast Cancer Other         great grandmother, 2 maternal great aunts - dx 30's, no genetic testing     Ovarian Cancer Other         paternal great aunt     Brain Cancer Mother         glioblastoma       REVIEW OF SYSTEMS  A 10 point review of systems including Constitutional, Eyes, Respiratory, Cardiovascular, Gastroenterology, Genitourinary, Integumentary, Musculoskeletal, and Psychiatric, were all negative, except for pertinent positives noted in the above HPI.    OBJECTIVE  Ht 1.524 m (5')   Wt 122.7 kg (270 lb 8 oz)   LMP 02/11/2020   BMI 52.83 kg/m       General: Alert, without distress  HEENT: normocephalic, without obvious abnormality   Cardiovascular: extremities warm and well-perfused   Lungs: normal work of breathing   Abdomen: soft, non-tender, non-distended, normal bowel sounds   Pelvic: normal external female genitalia; normal vagina without discharge; normal cervix without lesions/masses; uterus 6 week size, anteverted, mobile, nontender; " adnexae nontender and without masses; normal anus/perineum   Extremities: normal    UPT negative today  Pap 2018 NIL     Mirena IUD Procedure Note  Alva Joshi and I discussed the risks and benefits of the Mirena IUD. We reviewed that the Mirena IUD is approved for 5 years and has contraceptive efficacy superior to user-dependent methods such as oral contraceptives. Use of the Mirena is associated with irregular uterinebleeding in many women following its initial insertion. This typically improves after 3 to 6 months, when many women will note decreased menstrual flow or complete cessation of menses.    We discussed that the risks of IUD insertion include infection, especially during the first 40d after insertion, changes in menstrual flow, IUD expulsion, and uterine perforation. We discussed that no contraceptive method is 100% effective and therefore there is a possibility of contraceptive failure.     I also offered Alva a paracervical block to assist with the pain of insertion as she is a nulliparous woman. I discussed that it has been shown to ease pain with insertion, though the procedure is itself a bit uncomfortable. It is otherwise with minimal risk (reaction to lidocaine, no improvement in pain). Alva would like to try this.    After this discussion, I answered all of Alva Joshi's questions and signed the consent form.    A speculum was placed into the vagina. The cervix was cleansed with betadine. The anterior lip of the cervix was grasped with a single-toothed tenaculum. A paracervical block ws performed with 10cc of 1% lidocaine. A uterine pipelle was passed into the uterine cavity and the uterus sounded to 6cm. The Mirena IUD was inserted according to  instructions without difficulty.The strings were trimmed to 3cm. The instruments were removed and the tenaculum sites hemostatic. She tolerated the procedure well.    ASSESSMENT  Alva Joshi is a 29 year old  who is  here for a Mirena IUD insertion.    PLAN  Problem   Encounter for IUD Insertion    Uncomplicated insertion. Declined GC/CT as recent testing was negative and stable partner. Due to be removed 02/2025. Lot [id number]. String check in 6 weeks. Reviewed ibuprofen, heat packs for cramping, to call if heavy bleeding/severe cramping, fever, or concerns of expulsion.     Family History of Malignant Neoplasm of Breast    Asked Alva to discuss HBOC testing with her affected family members. If they are unable/unwilling to test, will refer Alva to genetic counselor so that we can determine if testing is warranted and if earlier breast cancer screening is appropriate.         RTC 6 weeks for string check.     Elisabeth Alonso MD, MSCI    [facility]/OBGYN    Again, thank you for allowing me to participate in the care of your patient.      Sincerely,    Elisabeth Alonso MD

## 2020-02-25 NOTE — LETTER
Date:February 27, 2020      Patient was self referred, no letter generated. Do not send.        [facility] Health Information

## 2020-02-26 PROBLEM — Z30.430 ENCOUNTER FOR IUD INSERTION: Status: ACTIVE | Noted: 2020-02-26

## 2020-02-26 PROBLEM — Z80.3 FAMILY HISTORY OF MALIGNANT NEOPLASM OF BREAST: Status: ACTIVE | Noted: 2020-02-26

## 2020-02-26 ASSESSMENT — ANXIETY QUESTIONNAIRES: GAD7 TOTAL SCORE: 4

## 2020-03-01 ENCOUNTER — HEALTH MAINTENANCE LETTER (OUTPATIENT)
Age: 30
End: 2020-03-01

## 2020-03-10 ENCOUNTER — OFFICE VISIT (OUTPATIENT)
Dept: FAMILY MEDICINE | Facility: CLINIC | Age: 30
End: 2020-03-10
Payer: COMMERCIAL

## 2020-03-10 VITALS
DIASTOLIC BLOOD PRESSURE: 70 MMHG | TEMPERATURE: 98.6 F | HEART RATE: 98 BPM | OXYGEN SATURATION: 98 % | WEIGHT: 271 LBS | HEIGHT: 62 IN | SYSTOLIC BLOOD PRESSURE: 122 MMHG | BODY MASS INDEX: 49.87 KG/M2

## 2020-03-10 DIAGNOSIS — E66.01 MORBID OBESITY (H): ICD-10-CM

## 2020-03-10 DIAGNOSIS — F32.A DEPRESSION, UNSPECIFIED DEPRESSION TYPE: Primary | ICD-10-CM

## 2020-03-10 DIAGNOSIS — Z00.00 ENCOUNTER FOR ROUTINE ADULT HEALTH EXAMINATION WITHOUT ABNORMAL FINDINGS: ICD-10-CM

## 2020-03-10 PROCEDURE — 99395 PREV VISIT EST AGE 18-39: CPT | Performed by: PHYSICIAN ASSISTANT

## 2020-03-10 PROCEDURE — 99213 OFFICE O/P EST LOW 20 MIN: CPT | Mod: 25 | Performed by: PHYSICIAN ASSISTANT

## 2020-03-10 RX ORDER — BUPROPION HYDROCHLORIDE 150 MG/1
150 TABLET ORAL EVERY MORNING
Qty: 90 TABLET | Refills: 1 | Status: SHIPPED | OUTPATIENT
Start: 2020-03-10 | End: 2020-07-30

## 2020-03-10 ASSESSMENT — ANXIETY QUESTIONNAIRES
IF YOU CHECKED OFF ANY PROBLEMS ON THIS QUESTIONNAIRE, HOW DIFFICULT HAVE THESE PROBLEMS MADE IT FOR YOU TO DO YOUR WORK, TAKE CARE OF THINGS AT HOME, OR GET ALONG WITH OTHER PEOPLE: NOT DIFFICULT AT ALL
3. WORRYING TOO MUCH ABOUT DIFFERENT THINGS: SEVERAL DAYS
GAD7 TOTAL SCORE: 3
5. BEING SO RESTLESS THAT IT IS HARD TO SIT STILL: NOT AT ALL
6. BECOMING EASILY ANNOYED OR IRRITABLE: NOT AT ALL
7. FEELING AFRAID AS IF SOMETHING AWFUL MIGHT HAPPEN: NOT AT ALL
2. NOT BEING ABLE TO STOP OR CONTROL WORRYING: SEVERAL DAYS
1. FEELING NERVOUS, ANXIOUS, OR ON EDGE: SEVERAL DAYS

## 2020-03-10 ASSESSMENT — PATIENT HEALTH QUESTIONNAIRE - PHQ9
SUM OF ALL RESPONSES TO PHQ QUESTIONS 1-9: 4
5. POOR APPETITE OR OVEREATING: NOT AT ALL

## 2020-03-10 ASSESSMENT — MIFFLIN-ST. JEOR: SCORE: 1904.56

## 2020-03-10 NOTE — PROGRESS NOTES
Answers for HPI/ROS submitted by the patient on 3/8/2020   Annual Exam:  Frequency of exercise:: None  Getting at least 3 servings of Calcium per day:: Yes  Diet:: Regular (no restrictions)  Taking medications regularly:: Yes  Medication side effects:: Not applicable  Bi-annual eye exam:: NO  Dental care twice a year:: Yes  Sleep apnea or symptoms of sleep apnea:: None  Additional concerns today:: Yes

## 2020-03-10 NOTE — PROGRESS NOTES
SUBJECTIVE:   CC: Alva Joshi is an 29 year old woman who presents for preventive health visit.     Healthy Habits:     Getting at least 3 servings of Calcium per day:  Yes    Bi-annual eye exam:  NO    Dental care twice a year:  Yes    Sleep apnea or symptoms of sleep apnea:  None    Diet:  Regular (no restrictions)    Frequency of exercise:  None    Taking medications regularly:  Yes    Medication side effects:  Not applicable    PHQ-2 Total Score: 2    Additional concerns today:  Yes    Discuss anxiety/depression issues possibly would like to restart wellbutrin    Today's PHQ-2 Score:   PHQ-2 ( 1999 Pfizer) 3/10/2020   Q1: Little interest or pleasure in doing things 1   Q2: Feeling down, depressed or hopeless 1   PHQ-2 Score 2   Q1: Little interest or pleasure in doing things -   Q2: Feeling down, depressed or hopeless -   PHQ-2 Score -       Abuse: Current or Past(Physical, Sexual or Emotional)- No  Do you feel safe in your environment? Yes    Alva is 29 year old female with a past medical history of migraines, depression, and anxiety who presents to clinic today for a physical and to discuss her mental health. Her mom was recently diagnosed with a glioblastoma and her boyfriend is in the [employer], she feels like these stressors are triggering her anxiety. The patient was on Wellbutrin from [age] years old that she felt did a good job of controlling her anxiety, she had no side effects from the medication. She feels she is having a hard time coping with her anxiety and it is inhibiting her daily activities. She is able to get through work each day but is struggling managing her mood.         Social History     Tobacco Use     Smoking status: Never Smoker     Smokeless tobacco: Never Used   Substance Use Topics     Alcohol use: Yes     Comment: socially         Alcohol Use 3/8/2020   Prescreen: >3 drinks/day or >7 drinks/week? No       Reviewed orders with patient.  Reviewed health maintenance and  "updated orders accordingly - Yes    Mammogram not appropriate for this patient based on age.    Pertinent mammograms are reviewed under the imaging tab.  History of abnormal Pap smear: NO - age 21-29 PAP every 3 years recommended  PAP / HPV 12/5/2018   PAP NIL     Reviewed and updated as needed this visit by clinical staff  Tobacco         Reviewed and updated as needed this visit by Provider        Review of Systems  CONSTITUTIONAL: NEGATIVE for change in weight  INTEGUMENTARU/SKIN: NEGATIVE for worrisome rashes, moles or lesions  EYES: NEGATIVE for vision changes or irritation  ENT: NEGATIVE for ear, mouth and throat problems  RESP: NEGATIVE for significant cough or SOB  BREAST: NEGATIVE for masses, tenderness or discharge  CV: NEGATIVE for chest pain, palpitations  GI: NEGATIVE for nausea, abdominal pain, heartburn, or change in bowel habits  : NEGATIVE for unusual urinary or vaginal symptoms. Periods are regular.  NEURO: NEGATIVE for dizziness  PSYCHIATRIC: See HPI     OBJECTIVE:   /70   Pulse 98   Temp 98.6  F (37  C) (Oral)   Ht 5' 1.5\" (1.562 m)   Wt 271 lb (122.9 kg)   LMP 02/11/2020   SpO2 98%   BMI 50.38 kg/m    Physical Exam  GENERAL: healthy, alert and no distress  EYES: Eyes grossly normal to inspection, PERRL and conjunctivae and sclerae normal  HENT: ear canals and TM's normal, nose and mouth without ulcers or lesions  NECK: no adenopathy, no asymmetry, masses, or scars and thyroid normal to palpation  RESP: lungs clear to auscultation - no rales, rhonchi or wheezes  CV: regular rate and rhythm, normal S1 S2, no S3 or S4, no murmur, click or rub  ABDOMEN: soft, nontender, no hepatosplenomegaly, no masses and bowel sounds normal  MS: no gross musculoskeletal defects noted  SKIN: no suspicious lesions or rashes  NEURO: Normal strength and tone, mentation intact and speech normal  PSYCH: mentation appears normal, anxious    Diagnostic Test Results:  Labs reviewed in Epic  No results found " "for this or any previous visit (from the past 24 hour(s)).    ASSESSMENT/PLAN:     1. Encounter for routine adult health examination without abnormal findings  UTD on HM    1. Depression, unspecified depression type  Wellbutrin has worked well for her in the past and so it is reasonable to start this again.  She is interested in counseling but may look at her employers EAP program first.  She will contact me if she needs a referral.   - Start buPROPion (WELLBUTRIN XL) 150 MG 24 hr tablet; Take 1 tablet (150 mg) by mouth every morning  Dispense: 90 tablet; Refill: 1  - Follow-up via mychart, appointment, or e-visit in 4 weeks to discuss mood after starting medication and counseling for possible dose adjustments.     2. Morbid obesity (H)  - Discussed diet and exercise thoroughly with patient. She is hoping to discuss this further during counseling.       COUNSELING:  Reviewed preventive health counseling, as reflected in patient instructions    Estimated body mass index is 50.38 kg/m  as calculated from the following:    Height as of this encounter: 5' 1.5\" (1.562 m).    Weight as of this encounter: 271 lb (122.9 kg).    Weight management plan: Discussed healthy diet and exercise guidelines     reports that she has never smoked. She has never used smokeless tobacco.      Counseling Resources:  ATP IV Guidelines  Pooled Cohorts Equation Calculator  Breast Cancer Risk Calculator  FRAX Risk Assessment  ICSI Preventive Guidelines  Dietary Guidelines for Americans, 2010  USDA's MyPlate  ASA Prophylaxis  Lung CA Screening    Lexa Marshall PA-C  [facility]  "

## 2020-03-11 ASSESSMENT — ANXIETY QUESTIONNAIRES: GAD7 TOTAL SCORE: 3

## 2020-03-26 ENCOUNTER — TELEPHONE (OUTPATIENT)
Dept: OBGYN | Facility: CLINIC | Age: 30
End: 2020-03-26

## 2020-03-26 ENCOUNTER — ANCILLARY PROCEDURE (OUTPATIENT)
Dept: ULTRASOUND IMAGING | Facility: CLINIC | Age: 30
End: 2020-03-26
Attending: OBSTETRICS & GYNECOLOGY
Payer: COMMERCIAL

## 2020-03-26 DIAGNOSIS — T83.9XXA IUD COMPLICATION (H): ICD-10-CM

## 2020-03-26 DIAGNOSIS — Z30.430 ENCOUNTER FOR IUD INSERTION: Primary | ICD-10-CM

## 2020-03-26 PROCEDURE — 76830 TRANSVAGINAL US NON-OB: CPT

## 2020-03-26 NOTE — TELEPHONE ENCOUNTER
Pt called to discuss symptoms concerning to her for iud  malpositioning including sharp cramping pain with certain movements. Period-like bleeding beginning 3/10 which continues today.    Recommended ultrasounds in clinic and if malpositioned will add to doc schedule. Scheduled today. Pt expressed understanding and agrees with plan.

## 2020-04-20 ENCOUNTER — VIRTUAL VISIT (OUTPATIENT)
Dept: OBGYN | Facility: CLINIC | Age: 30
End: 2020-04-20
Attending: OBSTETRICS & GYNECOLOGY
Payer: COMMERCIAL

## 2020-04-20 ENCOUNTER — TELEPHONE (OUTPATIENT)
Dept: OBGYN | Facility: CLINIC | Age: 30
End: 2020-04-20

## 2020-04-20 DIAGNOSIS — N92.1 BREAKTHROUGH BLEEDING WITH IUD: Primary | ICD-10-CM

## 2020-04-20 DIAGNOSIS — Z97.5 BREAKTHROUGH BLEEDING WITH IUD: Primary | ICD-10-CM

## 2020-04-20 RX ORDER — NORETHINDRONE ACETATE AND ETHINYL ESTRADIOL .02; 1 MG/1; MG/1
1 TABLET ORAL DAILY
Qty: 84 TABLET | Refills: 0 | Status: SHIPPED | OUTPATIENT
Start: 2020-04-20 | End: 2020-07-31

## 2020-04-20 NOTE — TELEPHONE ENCOUNTER
----- Message from Estrellita Frederick sent at 4/20/2020  9:05 AM CDT -----  Regarding: IUD  Hi,    Pt is scheduled for a string ck on 4/22. Pt states she has been bleeding since 3/10 and it stop for 4 days and she is bleeding again. Please contact pt to discuss.      Thank you,    Estrellita

## 2020-04-20 NOTE — PROGRESS NOTES
"Gynecology Visit Note  4/20/20    SUBJECTIVE     Alva is a 29 year old female who is being evaluated via a billable telephone visit.    Patient opted to conduct today's return visit via telephone secondary to the COVID-19 pandemic vs. an in person visit to the clinic.    I spoke with: Alva Ryder    The patient has been notified of following:   \"This telephone visit will be conducted via a call between you and your physician/provider. We have found that certain health care needs can be provided without the need for a physical exam.  This service lets us provide the care you need with a short phone conversation.  If a prescription is necessary we can send it directly to your pharmacy.  If lab work is needed we can place an order for that and you can then stop by our lab to have the test done at a later time.  If during the course of the call the physician/provider feels a telephone visit is not appropriate, you will not be charged for this service.\"     The reason for the telephone visit: Endometriosis, Bleeding with Mirena IUD in place    S: Patient is a 30 yo nulligravid female with history of endometriosis who had removal/replacement of Mirena IUD on 2/25/20 secondary to malpositioned IUD found during ED visit on 1/31/20.  Patient called in 1 month ago and was concerned about bleeding and had a Pelvic US at that time which showed IUD to be correctly placed.  Patient calls in today because she has been having bleeding since 3/10/20.  She states it stopped for 4 days, but now she has bleeding again since 3/15/20. She had some heavier bleeding again around 4/7/20 which she was guessing was a really period. She is using 3 tampons a day now.  She is having associated cramping.  She is alternating ibuprofen and tylenol.  Patient states when she had her first IUD placed in 2012 she needed to have OCP for a few months to control the bleeding and she is wondering if this is something that she could consider now as " the bleeding is annoying.    O:  No vitals as telephone visit    General: NAD, A&OX3  Resp: Non-labored breathing    Pelvic US 3/26/20:  Uterine findings:              Presence: Visible Size: Normal 5.2x4.4x3.8 cm.  Endometrium = 7.8 mm.              Cx length = 40.5 mm.                            Flexion:  Anteverted    Position: Midline          Margins: Smooth         Shape: Normal              Contour: Regular        Texture: Homogeneous          Cavity: IUD in place       Masses: Normal     Pelvic findings:               Right Adnexa: Normal              Left Adnexa: Normal              Bladder:  Normal                                                           Cul - de - sac fluid: None     Ovarian follicles:              Right ovary:  3.0x2.4x2.1cm.                 Small follicles                 Size(s):  Less than 1cm                 Left ovary:  2.7x1.5x1.8cm.                 1 follicles                 Size(s):  Less than 1cm    A/P: 30 yo nulligravid female presents with concerns of bleeding with Mirena IUD in place, placed 2/25/20  1) Bleeding, Mirena IUD in place: Discussed not uncommon to have irregular unpredictable bleeding for first 3-6 months after placement of new IUD.  Discussed trial of NSAID, VALORIE, POP or TXA to assist with her bleeding and see if this could assist with her bleeding issues.  As patient has tolerated OCP and is comfortable with this, plan to trial 3 months of continuous OCP to see if this helps her bleeding.  She is agreeable with this plan.  She will call back with any side effects or if she feels this intervention is not helpful.      Phone call start: 1:32 PM  Phone call end: 1:40 PM  Phone call duration:  8 minutes    Mary Abdi MD

## 2020-04-20 NOTE — TELEPHONE ENCOUNTER
Talked to patient and set up up for phone visit today  With Dr. Thayer.            ----- Message from Estrellita Frederick sent at 4/20/2020  9:05 AM CDT -----  Regarding: IUD  Hi,    Pt is scheduled for a string ck on 4/22. Pt states she has been bleeding since 3/10 and it stop for 4 days and she is bleeding again. Please contact pt to discuss.      Thank you,    Estrellita

## 2020-05-07 ENCOUNTER — VIRTUAL VISIT (OUTPATIENT)
Dept: DERMATOLOGY | Facility: CLINIC | Age: 30
End: 2020-05-07
Attending: DERMATOLOGY
Payer: COMMERCIAL

## 2020-05-07 DIAGNOSIS — L90.5 ACNE SCAR: ICD-10-CM

## 2020-05-07 DIAGNOSIS — L81.9 POST-INFLAMMATORY PIGMENTARY CHANGES: ICD-10-CM

## 2020-05-07 DIAGNOSIS — L70.0 ACNE VULGARIS: Primary | ICD-10-CM

## 2020-05-07 RX ORDER — DOXYCYCLINE 100 MG/1
100 CAPSULE ORAL 2 TIMES DAILY
Qty: 60 CAPSULE | Refills: 3 | Status: SHIPPED | OUTPATIENT
Start: 2020-05-07 | End: 2020-07-31

## 2020-05-07 NOTE — PROGRESS NOTES
"Alva is a 29 year old female who is being evaluated via a billable teledermatology visit.             The patient has been notified of following:            \"We have asked you to send in photos via Homuorkt or e-mail. These photos will be seen and reviewed by an MD or PANeoC.  A telederm visit is not as thorough as an in-person visit, photo assessment does not replace an in-person skin exam.  The quality of the photograph sent may not be of the same quality as that taken by the dermatology clinic. With that being said, we have found that certain health care needs can be provided without the need for a physical exam.  This service lets us provide the care you need with a short phone conversation. If prescriptions are needed we can send directly to your pharmacy.If lab work is needed we can place an order for that and you can then stop by our lab to have the test done at a later time. An MD/PA/Resident will call you around the time of your visit. This may be from a blocked number.     This is a billable visit. If during the course of the call the physician/provider feels a telephone visit is not appropriate, you will not be charged for this service.            Patient has given verbal consent for Telephone visit?  Yes           The patient would like to proceed with an teledermatology because of the COVID Pandemic.     Pediatric Dermatology- Review of Systems Questions (return patient)          Goal for today's visit? Treatment for acne     IN THE LAST 2 WEEKS     Fever- no     Mouth/Throat Sores- no/no     Weight Gain/Loss - no/no     Cough/Wheezing- no/no     Change in Appetite- no     Chest Discomfort/Heartburn - no/no     Bone Pain- no     Nausea/Vomiting - no/no     Joint Pain/Swelling - no/no     Constipation/Diarrhea - no/no     Headaches/Dizziness/Change in Vision- no/no/no     Pain with Urination- no     Ear Pain/Hearing Loss- no/no     Nasal Discharge/Bleeding- no/no     Sadness/Irritability- no/no "     Anxiety/Moodiness-no/no           Patient complains of    New patient visit for acne  -DOXYCYCLINE   -MINOCYCLINE  -SPIROLACTONE   -NEUTROGENA STRESS CONTROL FACE WASH  -ON 2 FORMS OF OCP CURRENTLY       ALLERGIES REVIEWED?  yes

## 2020-05-07 NOTE — PATIENT INSTRUCTIONS
[facility]- Pediatric Dermatology  Dr. Fatemeh Santillan, Dr. Raymon White, Dr. Johanna Joseph, Janelle Paez, MARIEL Paez, Dr. Minerva Alicea & Dr. Ruy Martinez       Non Urgent  Nurse Triage Line; [phone]- Queenie and Adelaide COLLINS Care Coordinators      Gricelda (/Complex ) [phone]      If you need a prescription refill, please contact your pharmacy. Refills are approved or denied by our Physicians during normal business hours, Monday through Fridays    Per office policy, refills will not be granted if you have not been seen within the past year (or sooner depending on your child's condition)      Scheduling Information:     Pediatric Appointment Scheduling and Call Center [phone]   Radiology Scheduling- [phone]     Sedation Unit Scheduling- [phone]    [facility] Scheduling- General [phone]; Pediatric Dermatology [phone]    Main  Services: [phone]   Icelandic: [phone]   Andorran: [phone]   Hmong/Croatian/Urdu: [phone]      Preadmission Nursing Department Fax Number: [phone] (Fax all pre-operative paperwork to this number)      For urgent matters arising during evenings, weekends, or holidays that cannot wait for normal business hours please call [phone] and ask for the Dermatology Resident On-Call to be paged.         -Doxycycline for this month  -pregnancy test today and again in 30 days +  -When I see pregnancy test come though in 30 days I will prescription for accutane  -Talk to Jeni for the paperwork

## 2020-05-07 NOTE — PROGRESS NOTES
[facility] (Store [facility] Forward ((National Emergency Concerning the CORONAVIRUS (COVID 19), preferred for return patients. )    Image quality and interpretability: acceptable    Physician has received verbal consent for a Video/Photos Visit from the patient? Yes    In-person dermatology visit recommendation: no    Consent has been obtained for this service by 1 care team member: yes.     Teledermatology information:  - Location of patient: Home  - Location of teledermatologist:  (PEDS DERMATOLOGY (Dr. Joseph, [place], [place])  - Reason teledermatology is appropriate:  of National Emergency Regarding Coronavirus disease (COVID 19) Outbreak  - Method of transmission:  Store and Forward ((National Emergency Concerning the CORONAVIRUS (COVID 19), preferred for return patients.   - Date of images: 05/07/20  - Service start time: 13:37  - Service end time:1352  - Date of report: 05/07/20      ___________________________________________________________________________    Pediatric Dermatology Clinic Note      Alva Joshi  29 year old  [id number]    CC: Patient presents with:  Teledermatology: Phone visit with photo review; NEW PATIENT VISIT FOR ACNE      Assessment and Plan:  1. Acne vulgaris:  Discussed that acne is secondary to follicular occlusion which is exacerbated by hormonal influence. Treatments were discussed at length including topical agents and systemic medications.   Acne is inflammatory with post inflammatory pigment changes and scarring. For this reason I suspect systemic treatment will be needed. I recommended initiation of isotretinoin given that all other systemic agents have been trialed without success.     We discussed the risks/benefits of isotretinoin in detail. We will initiate a month of doxycycline 100 mg BID during the 30 day waiting period. Discussed photosensitivity.     UPT today and again in 30+ days. At that point I will send prescription for isotretinoin 40 mg daily. Goal  "cumulative dose [place] be [address] mg.    Patient to be registered in ipledge when paperwork received.     Medication monitoring encounter for isotretinoin:  We discussed/reviewed the potential adverse effects including, but not limited to pseudotumor cerebri, dyslipidemia, LFT abnormalities, dry skin, dry eyes, dry mouth, photosensitivity, myalgias, arthralgias and suicidal ideations.  The risk of severe birth defects with pregnancies was also reviewed.  The patient was advised not to give blood or to share his/her medication with others per the iPLEDGE protocol.        RTC in 2 months by phone.     Thank you for involving me in this patient's care.     Johanna Joseph MD  Pediatric Dermatology Staff    CC:       [facility]    ______________________________________________________________________    HPI:   Alva Joshi is a 29 year old female  presenting for initial evaluation of acne.  Acne has been present for 10+ years.        Past treatments: doxycycline, minocycline, spironolactone, OCP, clindamycin lotion.   Current treatments:   Locations: lower face, flares with menses and when off OCP    Other Concerns: patient is worried about any photosensitizing meds because she likes to be outdoors in the summer.     Past Medical History:   Diagnosis Date     Endometriosis      Gastro-oesophageal reflux disease      Migraines      Numbness and tingling     right shoulder \"Pins and Needles\"     PONV (postoperative nausea and vomiting)        Allergies   Allergen Reactions     Augmentin Shortness Of Breath     Penicillins      Zithromax [Azithromycin] Diarrhea       Current Outpatient Medications   Medication     buPROPion (WELLBUTRIN XL) 150 MG 24 hr tablet     levonorgestrel (MIRENA) 20 MCG/24HR IUD     norethindrone-ethinyl estradiol (MICROGESTIN 1/20) 1-20 MG-MCG tablet     No current facility-administered medications for this visit.        Family History   Problem Relation Age of Onset     " Breast Cancer Maternal Grandmother      Diabetes Paternal Grandmother      Breast Cancer Other         great grandmother, 2 maternal great aunts - dx 30's, no genetic testing     Ovarian Cancer Other         paternal great aunt     Brain Cancer Mother         glioblastoma           Social Hx:  MA at [employer]    ROS: Negative for fever, weight loss, change in appetite, bone pain/swelling, headaches, vision or hearing problems, cough, rhinorrhea, nausea, vomiting, diarrhea, or mood changes.     PHYSICAL EXAMINATION:     There were no vitals taken for this visit.    GENERAL:  Well appearing and well nourished, in no acute distress.     EYES:  Clear.  Conjunctivae normal.     SKIN- inflammatory pink papules and pustules along the jaw line with circular hyperpigmented macules and atrophic macules on the lateral cheeks.

## 2020-05-08 ENCOUNTER — TELEPHONE (OUTPATIENT)
Dept: DERMATOLOGY | Facility: CLINIC | Age: 30
End: 2020-05-08

## 2020-05-08 DIAGNOSIS — L70.0 ACNE VULGARIS: ICD-10-CM

## 2020-05-08 LAB — HCG UR QL: NEGATIVE

## 2020-05-08 PROCEDURE — 81025 URINE PREGNANCY TEST: CPT | Performed by: DERMATOLOGY

## 2020-05-08 NOTE — TELEPHONE ENCOUNTER
Per Dr. Joseph's request, patient registered in IPledge.    IUD  MALE CONDOMS    [phone]    Jeni Garnett, [facility]

## 2020-05-14 ENCOUNTER — HOSPITAL ENCOUNTER (EMERGENCY)
Facility: CLINIC | Age: 30
Discharge: HOME | End: 2020-05-14
Attending: EMERGENCY MEDICINE | Admitting: EMERGENCY MEDICINE
Payer: COMMERCIAL

## 2020-05-14 ENCOUNTER — APPOINTMENT (OUTPATIENT)
Dept: CT IMAGING | Facility: CLINIC | Age: 30
End: 2020-05-14
Attending: EMERGENCY MEDICINE
Payer: COMMERCIAL

## 2020-05-14 ENCOUNTER — APPOINTMENT (OUTPATIENT)
Dept: ULTRASOUND IMAGING | Facility: CLINIC | Age: 30
End: 2020-05-14
Attending: EMERGENCY MEDICINE
Payer: COMMERCIAL

## 2020-05-14 VITALS
HEIGHT: 60 IN | HEART RATE: 110 BPM | OXYGEN SATURATION: 99 % | RESPIRATION RATE: 18 BRPM | WEIGHT: 250 LBS | DIASTOLIC BLOOD PRESSURE: 91 MMHG | BODY MASS INDEX: 49.08 KG/M2 | TEMPERATURE: 98.5 F | SYSTOLIC BLOOD PRESSURE: 151 MMHG

## 2020-05-14 DIAGNOSIS — N20.0 KIDNEY STONE: ICD-10-CM

## 2020-05-14 LAB
ALBUMIN SERPL-MCNC: 3.5 G/DL (ref 3.4–5)
ALBUMIN UR-MCNC: 30 MG/DL
ALP SERPL-CCNC: 83 U/L (ref 40–150)
ALT SERPL W P-5'-P-CCNC: 53 U/L (ref 0–50)
ANION GAP SERPL CALCULATED.3IONS-SCNC: 6 MMOL/L (ref 3–14)
APPEARANCE UR: ABNORMAL
AST SERPL W P-5'-P-CCNC: 15 U/L (ref 0–45)
BACTERIA #/AREA URNS HPF: ABNORMAL /HPF
BASOPHILS # BLD AUTO: 0 10E9/L (ref 0–0.2)
BASOPHILS NFR BLD AUTO: 0.4 %
BILIRUB SERPL-MCNC: 0.3 MG/DL (ref 0.2–1.3)
BILIRUB UR QL STRIP: NEGATIVE
BUN SERPL-MCNC: 14 MG/DL (ref 7–30)
CALCIUM SERPL-MCNC: 9.2 MG/DL (ref 8.5–10.1)
CHLORIDE SERPL-SCNC: 109 MMOL/L (ref 94–109)
CO2 SERPL-SCNC: 26 MMOL/L (ref 20–32)
COLOR UR AUTO: YELLOW
CREAT SERPL-MCNC: 1.07 MG/DL (ref 0.52–1.04)
DIFFERENTIAL METHOD BLD: NORMAL
EOSINOPHIL # BLD AUTO: 0.1 10E9/L (ref 0–0.7)
EOSINOPHIL NFR BLD AUTO: 1.2 %
ERYTHROCYTE [DISTWIDTH] IN BLOOD BY AUTOMATED COUNT: 13.1 % (ref 10–15)
GFR SERPL CREATININE-BSD FRML MDRD: 70 ML/MIN/{1.73_M2}
GLUCOSE SERPL-MCNC: 116 MG/DL (ref 70–99)
GLUCOSE UR STRIP-MCNC: NEGATIVE MG/DL
HCG SERPL QL: NEGATIVE
HCT VFR BLD AUTO: 41.5 % (ref 35–47)
HGB BLD-MCNC: 14 G/DL (ref 11.7–15.7)
HGB UR QL STRIP: ABNORMAL
IMM GRANULOCYTES # BLD: 0 10E9/L (ref 0–0.4)
IMM GRANULOCYTES NFR BLD: 0.1 %
KETONES UR STRIP-MCNC: 5 MG/DL
LEUKOCYTE ESTERASE UR QL STRIP: ABNORMAL
LIPASE SERPL-CCNC: 457 U/L (ref 73–393)
LYMPHOCYTES # BLD AUTO: 2.2 10E9/L (ref 0.8–5.3)
LYMPHOCYTES NFR BLD AUTO: 26.9 %
MCH RBC QN AUTO: 29.9 PG (ref 26.5–33)
MCHC RBC AUTO-ENTMCNC: 33.7 G/DL (ref 31.5–36.5)
MCV RBC AUTO: 89 FL (ref 78–100)
MONOCYTES # BLD AUTO: 0.4 10E9/L (ref 0–1.3)
MONOCYTES NFR BLD AUTO: 4.6 %
MUCOUS THREADS #/AREA URNS LPF: PRESENT /LPF
NEUTROPHILS # BLD AUTO: 5.4 10E9/L (ref 1.6–8.3)
NEUTROPHILS NFR BLD AUTO: 66.8 %
NITRATE UR QL: NEGATIVE
NRBC # BLD AUTO: 0 10*3/UL
NRBC BLD AUTO-RTO: 0 /100
PH UR STRIP: 6 PH (ref 5–7)
PLATELET # BLD AUTO: 300 10E9/L (ref 150–450)
POTASSIUM SERPL-SCNC: 3.3 MMOL/L (ref 3.4–5.3)
PROT SERPL-MCNC: 7.6 G/DL (ref 6.8–8.8)
RBC # BLD AUTO: 4.68 10E12/L (ref 3.8–5.2)
RBC #/AREA URNS AUTO: 9 /HPF (ref 0–2)
SODIUM SERPL-SCNC: 141 MMOL/L (ref 133–144)
SOURCE: ABNORMAL
SP GR UR STRIP: 1.03 (ref 1–1.03)
SQUAMOUS #/AREA URNS AUTO: 19 /HPF (ref 0–1)
UROBILINOGEN UR STRIP-MCNC: 2 MG/DL (ref 0–2)
WBC # BLD AUTO: 8.1 10E9/L (ref 4–11)
WBC #/AREA URNS AUTO: 7 /HPF (ref 0–5)

## 2020-05-14 PROCEDURE — 85025 COMPLETE CBC W/AUTO DIFF WBC: CPT | Performed by: EMERGENCY MEDICINE

## 2020-05-14 PROCEDURE — 25000128 H RX IP 250 OP 636: Performed by: EMERGENCY MEDICINE

## 2020-05-14 PROCEDURE — 96375 TX/PRO/DX INJ NEW DRUG ADDON: CPT

## 2020-05-14 PROCEDURE — 76705 ECHO EXAM OF ABDOMEN: CPT

## 2020-05-14 PROCEDURE — 83690 ASSAY OF LIPASE: CPT | Performed by: EMERGENCY MEDICINE

## 2020-05-14 PROCEDURE — 96374 THER/PROPH/DIAG INJ IV PUSH: CPT | Mod: 59

## 2020-05-14 PROCEDURE — 84703 CHORIONIC GONADOTROPIN ASSAY: CPT | Performed by: EMERGENCY MEDICINE

## 2020-05-14 PROCEDURE — 80053 COMPREHEN METABOLIC PANEL: CPT | Performed by: EMERGENCY MEDICINE

## 2020-05-14 PROCEDURE — 99285 EMERGENCY DEPT VISIT HI MDM: CPT | Mod: 25

## 2020-05-14 PROCEDURE — 74176 CT ABD & PELVIS W/O CONTRAST: CPT

## 2020-05-14 PROCEDURE — 96376 TX/PRO/DX INJ SAME DRUG ADON: CPT

## 2020-05-14 PROCEDURE — 25800030 ZZH RX IP 258 OP 636: Performed by: EMERGENCY MEDICINE

## 2020-05-14 PROCEDURE — 81001 URINALYSIS AUTO W/SCOPE: CPT | Performed by: EMERGENCY MEDICINE

## 2020-05-14 PROCEDURE — 96361 HYDRATE IV INFUSION ADD-ON: CPT

## 2020-05-14 RX ORDER — ONDANSETRON 2 MG/ML
4 INJECTION INTRAMUSCULAR; INTRAVENOUS EVERY 30 MIN PRN
Status: DISCONTINUED | OUTPATIENT
Start: 2020-05-14 | End: 2020-05-14 | Stop reason: HOSPADM

## 2020-05-14 RX ORDER — HYDROMORPHONE HYDROCHLORIDE 1 MG/ML
0.5 INJECTION, SOLUTION INTRAMUSCULAR; INTRAVENOUS; SUBCUTANEOUS
Status: COMPLETED | OUTPATIENT
Start: 2020-05-14 | End: 2020-05-14

## 2020-05-14 RX ORDER — OXYCODONE HYDROCHLORIDE 5 MG/1
5 TABLET ORAL EVERY 6 HOURS PRN
Qty: 12 TABLET | Refills: 0 | Status: SHIPPED | OUTPATIENT
Start: 2020-05-14 | End: 2020-07-31

## 2020-05-14 RX ORDER — ONDANSETRON 4 MG/1
4 TABLET, ORALLY DISINTEGRATING ORAL EVERY 8 HOURS PRN
Qty: 10 TABLET | Refills: 0 | Status: SHIPPED | OUTPATIENT
Start: 2020-05-14 | End: 2020-07-31

## 2020-05-14 RX ORDER — KETOROLAC TROMETHAMINE 15 MG/ML
10 INJECTION, SOLUTION INTRAMUSCULAR; INTRAVENOUS ONCE
Status: COMPLETED | OUTPATIENT
Start: 2020-05-14 | End: 2020-05-14

## 2020-05-14 RX ADMIN — ONDANSETRON 4 MG: 2 INJECTION INTRAMUSCULAR; INTRAVENOUS at 03:46

## 2020-05-14 RX ADMIN — SODIUM CHLORIDE 1000 ML: 9 INJECTION, SOLUTION INTRAVENOUS at 04:40

## 2020-05-14 RX ADMIN — HYDROMORPHONE HYDROCHLORIDE 0.5 MG: 1 INJECTION, SOLUTION INTRAMUSCULAR; INTRAVENOUS; SUBCUTANEOUS at 04:35

## 2020-05-14 RX ADMIN — ONDANSETRON 4 MG: 2 INJECTION INTRAMUSCULAR; INTRAVENOUS at 05:22

## 2020-05-14 RX ADMIN — HYDROMORPHONE HYDROCHLORIDE 0.5 MG: 1 INJECTION, SOLUTION INTRAMUSCULAR; INTRAVENOUS; SUBCUTANEOUS at 04:44

## 2020-05-14 RX ADMIN — HYDROMORPHONE HYDROCHLORIDE 0.5 MG: 1 INJECTION, SOLUTION INTRAMUSCULAR; INTRAVENOUS; SUBCUTANEOUS at 03:47

## 2020-05-14 RX ADMIN — KETOROLAC TROMETHAMINE 10 MG: 15 INJECTION, SOLUTION INTRAMUSCULAR; INTRAVENOUS at 04:42

## 2020-05-14 ASSESSMENT — ENCOUNTER SYMPTOMS
FEVER: 0
ABDOMINAL PAIN: 1
COUGH: 0
SHORTNESS OF BREATH: 0
VOMITING: 1
FLANK PAIN: 1
NAUSEA: 1

## 2020-05-14 ASSESSMENT — MIFFLIN-ST. JEOR: SCORE: 1785.49

## 2020-05-14 NOTE — ED PROVIDER NOTES
History     Chief Complaint:  Flank Pain     HPI   Alva Joshi is a 29 year old female with a history of endometriosis who presents to the emergency department for evaluation of flank pain. The patient reports she has nauseous all day yesterday but was otherwise at baseline when she went to bed. However, around 0045, she awoke with right flank pain. She states she took Tylenol and urinated and had a bowel movement, but had no improvement in her pain. Since onset, her pain has continued to increase in intensity and has begun radiating to the right upper quadrant of her abdomen. The patient further reports one episode of vomiting at around 0200 today as well. The patient denies any history of similar symptoms.    Allergies:  Augmentin  Penicillins  Zithromax [Azithromycin]     Medications:    Wellbutrin  Mirena  Microgestin  Doxycycline     Past Medical History:    Endometriosis  GERD  Migraines  PONV  Obesity     Past Surgical History:    Shoulder arthroscopy, right  Deviated septum fixed  Insert IUD  Cystectomy ovarian     Family History:    Brain cancer removal    Social History:  Presents alone.  Never smoker.  Positive for alcohol use.   Marital Status:  Single [1]     Review of Systems   Constitutional: Negative for fever.   Respiratory: Negative for cough and shortness of breath.    Cardiovascular: Negative for chest pain.   Gastrointestinal: Positive for abdominal pain, nausea and vomiting.   Genitourinary: Positive for flank pain.   All other systems reviewed and are negative.        Physical Exam     Patient Vitals for the past 24 hrs:   BP Temp Temp src Pulse Resp SpO2 Height Weight   05/14/20 0309 (!) 151/91 98.5  F (36.9  C) Oral 110 18 99 % 1.524 m (5') 113.4 kg (250 lb)     Physical Exam  General: Appears uncomfortable  Head: No signs of trauma.   CV: Normal rate and regular rhythm.    Resp: Effort normal and breath sounds normal. No respiratory distress.   GI: Soft. There is right sided  tenderness.  No rebound or guarding.  Normal bowel sounds.  No CVA tenderness.  MSK: Normal range of motion.   Neuro: The patient is alert and oriented.  Speech normal.  GCS 15  Skin: Skin is warm and dry. No rash noted.   Psych: normal mood and affect. behavior is normal.       Emergency Department Course     Imaging:  Radiology findings were communicated with the patient who voiced understanding of the findings.    US Abdomen, Limited (RUQ):  1.  Fatty liver.  2.  Normal gallbladder.  3.  Slight right hydronephrosis.  As per radiology.     CT Abdomen/Pelvis without contrast:  1.  2 mm right distal ureteral stone resulting in mild right-sided hydroureteronephrosis.  2.  2 mm punctate nonobstructing right renal stone.  3.  Normal appendix. No bowel obstruction, colitis or diverticulitis.  As per radiology.    Laboratory:  Laboratory findings were communicated with the patient who voiced understanding of the findings.    CBC: WBC: 8.1, HGB: 14.0, PLT: 300  CMP: Glucose 116 (H), Potassium 3.3. (L), Creatinine 1.07 (H), ALT 53 (H), o/w WNL   Lipase: 457 (H)    HCG Qualitative: Negative    UA with micro: Ketones 5, Blood Moderate, Albumin 30, Leukocyte Esterase Large, RBC 9 (H), WBC 7 (H), Bacteria Few, Squamous Epithelial 19 (H), Mucous Present, o/w negative    Interventions:  0346 Zofran 4 mg IV  0440 NS 1L IV Bolus  0442 Toradol 10 mg IV  0444 Dilaudid 0.5 mg IV    Emergency Department Course:  Past medical records, nursing notes, and vitals reviewed.    0332 I performed an exam of the patient as documented above.     IV was inserted and blood was drawn for laboratory testing, results above.    The patient provided a urine sample here in the emergency department. This was sent for laboratory testing, findings above.    The patient was sent for a US Abdomen, CT Abd/Pelvis while in the emergency department, results above.     0517 I rechecked the patient and discussed the results of her workup thus far.     Findings  and plan explained to the Patient. Patient discharged home with instructions regarding supportive care, medications, and reasons to return. The importance of close follow-up was reviewed. The patient was prescribed Zofran, Roxicodone.    I personally reviewed the laboratory results with the Patient and answered all related questions prior to discharge.     Impression & Plan     Medical Decision Making:  Alva Joshi is a 29 year old female 29-year-old woman presents due to right abdominal pain along with nausea.  Symptoms started this evening.  On my evaluation, she does have tenderness of the right flank and right upper quadrant.  I did obtain blood work, urine, CT, and ultrasound.  CT did show a 2 mm ureteral stone, which I feel is the etiology of her symptoms.  Urine did show some blood.  Patient not having clinical symptoms for infection, and her symptoms were controlled with the above medications.  She was discharged home with recommendation for supportive care and instructed to follow the primary care doctor and return for fevers, uncontrolled pain, or any other concerning symptoms.      Diagnosis:    ICD-10-CM    1. Kidney stone  N20.0        Disposition:  Discharged to home.    Discharge Medications:  New Prescriptions    ONDANSETRON (ZOFRAN ODT) 4 MG ODT TAB    Take 1 tablet (4 mg) by mouth every 8 hours as needed for nausea    OXYCODONE (ROXICODONE) 5 MG TABLET    Take 1 tablet (5 mg) by mouth every 6 hours as needed for pain     Scribe Disclosure:  I, Jad Patiño, am serving as a scribe at 3:28 AM on 5/14/2020 to document services personally performed by Woody Rust MD based on my observations and the provider's statements to me.      EMERGENCY DEPARTMENT     Woody Rust MD  05/14/20 0607

## 2020-05-14 NOTE — ED AVS SNAPSHOT
Emergency Department  [address] [facility] [address]  Phone:  [phone]  Fax:  [phone]                                    Alva Joshi   MRN: [id number]    Department:   Emergency Department   Date of Visit:  5/14/2020           After Visit Summary Signature Page    I have received my discharge instructions, and my questions have been answered. I have discussed any challenges I see with this plan with the nurse or doctor.    ..........................................................................................................................................  Patient/Patient Representative Signature      ..........................................................................................................................................  Patient Representative Print Name and Relationship to Patient    ..................................................               ................................................  Date                                   Time    ..........................................................................................................................................  Reviewed by Signature/Title    ...................................................              ..............................................  Date                                               Time          22EPIC Rev 08/18

## 2020-06-04 ENCOUNTER — TELEPHONE (OUTPATIENT)
Dept: DERMATOLOGY | Facility: CLINIC | Age: 30
End: 2020-06-04

## 2020-06-09 DIAGNOSIS — L70.0 ACNE VULGARIS: ICD-10-CM

## 2020-06-09 LAB — HCG UR QL: NEGATIVE

## 2020-06-09 PROCEDURE — 81025 URINE PREGNANCY TEST: CPT | Performed by: DERMATOLOGY

## 2020-06-09 NOTE — PROGRESS NOTES
"Alva is a 29 year old female who is being evaluated via a billable teledermatology visit.             The patient has been notified of following:            \"We have asked you to send in photos via [employer] or e-mail. These photos will be seen and reviewed by an MD or PANeoC.  A telederm visit is not as thorough as an in-person visit, photo assessment does not replace an in-person skin exam.  The quality of the photograph sent may not be of the same quality as that taken by the dermatology clinic. With that being said, we have found that certain health care needs can be provided without the need for a physical exam.  This service lets us provide the care you need with a short phone conversation. If prescriptions are needed we can send directly to your pharmacy.If lab work is needed we can place an order for that and you can then stop by our lab to have the test done at a later time. An MD/PA/Resident will call you around the time of your visit. This may be from a blocked number.     This is a billable visit. If during the course of the call the physician/provider feels a telephone visit is not appropriate, you will not be charged for this service.            Patient has given verbal consent for Telephone visit?  Yes           The patient would like to proceed with an teledermatology because of the COVID Pandemic.   Pediatric Dermatology- Review of Systems Questions (return patient)          Goal for today's visit? Start accutane     IN THE LAST 2 WEEKS     Fever- no     Mouth/Throat Sores- no/no     Weight Gain/Loss - no/no     Cough/Wheezing- no/no     Change in Appetite- no     Chest Discomfort/Heartburn - no/no     Bone Pain- no     Nausea/Vomiting - no/no     Joint Pain/Swelling - no/no     Constipation/Diarrhea - no/no     Headaches/Dizziness/Change in Vision- no/no/no     Pain with Urination- no     Ear Pain/Hearing Loss- no/no     Nasal Discharge/Bleeding- no/no     Sadness/Irritability- no/no "     Anxiety/Moodiness-no/no         Patient complains of    Acne- start accutane       ALLERGIES REVIEWED?  yes

## 2020-06-10 ENCOUNTER — VIRTUAL VISIT (OUTPATIENT)
Dept: DERMATOLOGY | Facility: CLINIC | Age: 30
End: 2020-06-10
Attending: DERMATOLOGY
Payer: COMMERCIAL

## 2020-06-10 DIAGNOSIS — L70.0 ACNE VULGARIS: Primary | ICD-10-CM

## 2020-06-10 DIAGNOSIS — Z51.81 MEDICATION MONITORING ENCOUNTER: ICD-10-CM

## 2020-06-10 RX ORDER — ISOTRETINOIN 20 MG/1
40 CAPSULE ORAL DAILY
Qty: 60 CAPSULE | Refills: 0 | Status: SHIPPED | OUTPATIENT
Start: 2020-06-10 | End: 2020-07-31 | Stop reason: DRUGHIGH

## 2020-06-10 NOTE — PROGRESS NOTES
[facility] ([facility] ((National Emergency Concerning the CORONAVIRUS (COVID 19), preferred for return patients. )    Image quality and interpretability: acceptable    Physician has received verbal consent for a Video/Photos Visit from the patient? Yes    In-person dermatology visit recommendation: no    Consent has been obtained for this service by 1 care team member: yes.     Teledermatology information:  - Location of patient: Home  - Location of teledermatologist:  (PEDS DERMATOLOGY (Dr. Joseph, [place], [place])  - Reason teledermatology is appropriate:  of National Emergency Regarding Coronavirus disease (COVID 19) Outbreak  - Method of transmission:  Store and Forward ((National Emergency Concerning the CORONAVIRUS (COVID 19), preferred for return patients.   - Date of images: 06/10/20  - Service start time:0915a  - Service end [id number]  - Date of report: 06/10/20        ___________________________________________________________________________    Pediatric Dermatology Clinic Note      Alva Joshi  29 year old  [id number]    CC: Patient presents with:  Teledermatology: Teledermatology with photo review.       Assessment and Plan:  1. Acne vulgaris:  Acne is inflammatory with post inflammatory pigment changes and scarring. For this reason I suspect systemic treatment will be needed. I recommended initiation of isotretinoin given that all other systemic agents have been trialed without success.     -UPT negative x2.  Goal cumulative dose will be 46855-13089 mg.  -Start isotretinoin 40 mg daily       2. Medication monitoring encounter for isotretinoin:  We discussed/reviewed the potential adverse effects including, but not limited to pseudotumor cerebri, dyslipidemia, LFT abnormalities, dry skin, dry eyes, dry mouth, photosensitivity, myalgias, arthralgias and suicidal ideations.  The risk of severe birth defects with pregnancies was also reviewed.  The patient was advised not to give  "blood or to share his/her medication with others per the iPLEDGE protocol.        RTC in 1 months by phone.     Thank you for involving me in this patient's care.     Johanna Joseph MD  Pediatric Dermatology Staff    CC:       [facility], [place]    ______________________________________________________________________    HPI:   Alva Joshi is a 29 year old female presenting for initial evaluation of acne.  Acne has been present for 10+ years. Rx sent for doxycycline last month, but did not complete this med. Had a recent flare.        Past treatments: doxycycline, minocycline, spironolactone, OCP, clindamycin lotion.       Past Medical History:   Diagnosis Date     Endometriosis      Gastro-oesophageal reflux disease      Migraines      Numbness and tingling     right shoulder \"Pins and Needles\"     PONV (postoperative nausea and vomiting)        Allergies   Allergen Reactions     Augmentin Shortness Of Breath     Penicillins      Zithromax [Azithromycin] Diarrhea       Current Outpatient Medications   Medication     buPROPion (WELLBUTRIN XL) 150 MG 24 hr tablet     doxycycline hyclate (VIBRAMYCIN) 100 MG capsule     levonorgestrel (MIRENA) 20 MCG/24HR IUD     norethindrone-ethinyl estradiol (MICROGESTIN 1/20) 1-20 MG-MCG tablet     oxyCODONE (ROXICODONE) 5 MG tablet     No current facility-administered medications for this visit.        Family History   Problem Relation Age of Onset     Breast Cancer Maternal Grandmother      Diabetes Paternal Grandmother      Breast Cancer Other         great grandmother, 2 maternal great aunts - dx 30's, no genetic testing     Ovarian Cancer Other         paternal great aunt     Brain Cancer Mother         glioblastoma       Social Hx:  MA at [employer]    ROS: Negative for fever, weight loss, change in appetite, bone pain/swelling, headaches, vision or hearing problems, cough, rhinorrhea, nausea, vomiting, diarrhea, or mood changes.     PHYSICAL EXAMINATION:     LMP " 05/14/2020   GENERAL:  Well appearing and well nourished, in no acute distress.     NEURO: Normal mentation and speech  PSYCH: Normal mood/affect  EYES:  Clear.  Conjunctivae normal.     SKIN- inflammatory pink papules and pustules along the jaw line with circular hyperpigmented macules and atrophic macules on the lateral cheeks.   Light brown macules on the upper cheeks and forehead  Tan skin

## 2020-06-10 NOTE — LETTER
Date:June 12, 2020      Patient was self referred, no letter generated. Do not send.        [facility] Health Information

## 2020-06-10 NOTE — PROGRESS NOTES
[facility] (Store and Forward ((National Emergency Concerning the CORONAVIRUS (COVID 19), preferred for return patients. )    Image quality and interpretability: acceptable    Physician has received verbal consent for a Video/Photos Visit from the patient? Yes    In-person dermatology visit recommendation: no    Consent has been obtained for this service by 1 care team member: yes.     Teledermatology information:  - Location of patient: Home  - Location of teledermatologist:  (PEDS DERMATOLOGY (Dr. Joseph, [place], [place])  - Reason teledermatology is appropriate:  of National Emergency Regarding Coronavirus disease (COVID 19) Outbreak  - Method of transmission:  Store and Forward ((National Emergency Concerning the CORONAVIRUS (COVID 19), preferred for return patients.   - Date of images: 06/10/20  - Service start time:916  - Service end time: 0922  - Date of report: 06/10/20    ___________________________________________________________________________    Pediatric Dermatology Clinic Note      Alva Joshi  29 year old  [id number]    CC: Patient presents with:  Teledermatology: Teledermatology with photo review.       Assessment and Plan:  1. Acne vulgaris:  Acne is inflammatory with post inflammatory pigment changes and scarring. For this reason I suspect systemic treatment will be needed. I recommended initiation of isotretinoin given that all other systemic agents have been trialed without success.     -UPT negative x2.  Goal cumulative dose will be 68994-[address]0007 mg.  -Start isotretinoin 40 mg daily       2. Medication monitoring encounter for isotretinoin:  We discussed/reviewed the potential adverse effects including, but not limited to pseudotumor cerebri, dyslipidemia, LFT abnormalities, dry skin, dry eyes, dry mouth, photosensitivity, myalgias, arthralgias and suicidal ideations.  The risk of severe birth defects with pregnancies was also reviewed.  The patient was advised not to give blood or  "to share his/her medication with others per the iPLEDGE protocol.        RTC in 1 months by phone with labs.      Thank you for involving me in this patient's care.     Johanna Joseph MD  Pediatric Dermatology Staff    CC:       [facility], [place]    ______________________________________________________________________    HPI:   Alva Joshi is a 29 year old female presenting for initial evaluation of acne.  Acne has been present for 10+ years. Rx sent for doxycycline last month, but did not complete this med due to side effects. Had a recent flare.        Past treatments: doxycycline, minocycline, spironolactone, OCP, clindamycin lotion.       Past Medical History:   Diagnosis Date     Endometriosis      Gastro-oesophageal reflux disease      Migraines      Numbness and tingling     right shoulder \"Pins and Needles\"     PONV (postoperative nausea and vomiting)        Allergies   Allergen Reactions     Augmentin Shortness Of Breath     Penicillins      Zithromax [Azithromycin] Diarrhea       Current Outpatient Medications   Medication     buPROPion (WELLBUTRIN XL) 150 MG 24 hr tablet     doxycycline hyclate (VIBRAMYCIN) 100 MG capsule     levonorgestrel (MIRENA) 20 MCG/24HR IUD     norethindrone-ethinyl estradiol (MICROGESTIN 1/20) 1-20 MG-MCG tablet     oxyCODONE (ROXICODONE) 5 MG tablet     No current facility-administered medications for this visit.        Family History   Problem Relation Age of Onset     Breast Cancer Maternal Grandmother      Diabetes Paternal Grandmother      Breast Cancer Other         great grandmother, 2 maternal great aunts - dx 30's, no genetic testing     Ovarian Cancer Other         paternal great aunt     Brain Cancer Mother         glioblastoma       Social Hx:  MA at [employer]    ROS: Negative for fever, weight loss, change in appetite, bone pain/swelling, headaches, vision or hearing problems, cough, rhinorrhea, nausea, vomiting, diarrhea, or mood changes.     PHYSICAL " EXAMINATION:     [facility] 05/14/2020   GENERAL:  Well appearing and well nourished, in no acute distress.     NEURO: Normal mentation and speech  PSYCH: Normal mood/affect  EYES:  Clear.  Conjunctivae normal.     SKIN- inflammatory pink papules and pustules along the jaw line with circular hyperpigmented macules and atrophic macules on the lateral cheeks.   Light brown macules on the upper cheeks and forehead  Tan skin

## 2020-06-10 NOTE — LETTER
"  6/10/2020      RE: Alva Joshi  [address] [place] [address]       Alva is a 29 year old female who is being evaluated via a billable teledermatology visit.             The patient has been notified of following:            \"We have asked you to send in photos via C[employer] or e-mail. These photos will be seen and reviewed by an MD or PA-C.  A telederm visit is not as thorough as an in-person visit, photo assessment does not replace an in-person skin exam.  The quality of the photograph sent may not be of the same quality as that taken by the dermatology clinic. With that being said, we have found that certain health care needs can be provided without the need for a physical exam.  This service lets us provide the care you need with a short phone conversation. If prescriptions are needed we can send directly to your pharmacy.If lab work is needed we can place an order for that and you can then stop by our lab to have the test done at a later time. An MD/PA/Resident will call you around the time of your visit. This may be from a blocked number.     This is a billable visit. If during the course of the call the physician/provider feels a telephone visit is not appropriate, you will not be charged for this service.            Patient has given verbal consent for Telephone visit?  Yes           The patient would like to proceed with an teledermatology because of the COVID Pandemic.   Pediatric Dermatology- Review of Systems Questions (return patient)          Goal for today's visit? Start accutane     IN THE LAST 2 WEEKS     Fever- no     Mouth/Throat Sores- no/no     Weight Gain/Loss - no/no     Cough/Wheezing- no/no     Change in Appetite- no     Chest Discomfort/Heartburn - no/no     Bone Pain- no     Nausea/Vomiting - no/no     Joint Pain/Swelling - no/no     Constipation/Diarrhea - no/no     Headaches/Dizziness/Change in Vision- no/no/no     Pain with Urination- no     Ear Pain/Hearing Loss- no/no "     Nasal Discharge/Bleeding- no/no     Sadness/Irritability- no/no     Anxiety/Moodiness-no/no         Patient complains of    Acne- start accutane       ALLERGIES REVIEWED?  yes      [facility] (Store [facility] Forward ((National Emergency Concerning the CORONAVIRUS (COVID 19), preferred for return patients. )    Image quality and interpretability: acceptable    Physician has received verbal consent for a Video/Photos Visit from the patient? Yes    In-person dermatology visit recommendation: no    Consent has been obtained for this service by 1 care team member: yes.     Teledermatology information:  - Location of patient: Home  - Location of teledermatologist:  (PEDS DERMATOLOGY (Dr. Joseph, [place], [place])  - Reason teledermatology is appropriate:  of National Emergency Regarding Coronavirus disease (COVID 19) Outbreak  - Method of transmission:  Store and Forward ((National Emergency Concerning the CORONAVIRUS (COVID 19), preferred for return patients.   - Date of images: 06/10/20  - Service start time:0915a  - Service end [id number]  - Date of report: 06/10/20        ___________________________________________________________________________    Pediatric Dermatology Clinic Note      Alva Joshi  29 year old  [id number]    CC: Patient presents with:  Teledermatology: Teledermatology with photo review.       Assessment and Plan:  1. Acne vulgaris:  Acne is inflammatory with post inflammatory pigment changes and scarring. For this reason I suspect systemic treatment will be needed. I recommended initiation of isotretinoin given that all other systemic agents have been trialed without success.     -UPT negative x2.  Goal cumulative dose will be 93962-[address] mg.  -Start isotretinoin 40 mg daily       2. Medication monitoring encounter for isotretinoin:  We discussed/reviewed the potential adverse effects including, but not limited to pseudotumor cerebri, dyslipidemia, LFT abnormalities, dry skin, dry eyes,  "dry mouth, photosensitivity, myalgias, arthralgias and suicidal ideations.  The risk of severe birth defects with pregnancies was also reviewed.  The patient was advised not to give blood or to share his/her medication with others per the iPLEDGE protocol.        RTC in 1 months by phone.     Thank you for involving me in this patient's care.     Johanna Joseph MD  Pediatric Dermatology Staff    CC:       [facility]    ______________________________________________________________________    HPI:   Alva Joshi is a 29 year old female presenting for initial evaluation of acne.  Acne has been present for 10+ years. Rx sent for doxycycline last month, but did not complete this med. Had a recent flare.        Past treatments: doxycycline, minocycline, spironolactone, OCP, clindamycin lotion.       Past Medical History:   Diagnosis Date     Endometriosis      Gastro-oesophageal reflux disease      Migraines      Numbness and tingling     right shoulder \"Pins and Needles\"     PONV (postoperative nausea and vomiting)        Allergies   Allergen Reactions     Augmentin Shortness Of Breath     Penicillins      Zithromax [Azithromycin] Diarrhea       Current Outpatient Medications   Medication     buPROPion (WELLBUTRIN XL) 150 MG 24 hr tablet     doxycycline hyclate (VIBRAMYCIN) 100 MG capsule     levonorgestrel (MIRENA) 20 MCG/24HR IUD     norethindrone-ethinyl estradiol (MICROGESTIN 1/20) 1-20 MG-MCG tablet     oxyCODONE (ROXICODONE) 5 MG tablet     No current facility-administered medications for this visit.        Family History   Problem Relation Age of Onset     Breast Cancer Maternal Grandmother      Diabetes Paternal Grandmother      Breast Cancer Other         great grandmother, 2 maternal great aunts - dx 30's, no genetic testing     Ovarian Cancer Other         paternal great aunt     Brain Cancer Mother         glioblastoma       Social Hx:  MA at [employer]    ROS: Negative for fever, weight " loss, change in appetite, bone pain/swelling, headaches, vision or hearing problems, cough, rhinorrhea, nausea, vomiting, diarrhea, or mood changes.     PHYSICAL EXAMINATION:     [facility] 05/14/2020   GENERAL:  Well appearing and well nourished, in no acute distress.     NEURO: Normal mentation and speech  PSYCH: Normal mood/affect  EYES:  Clear.  Conjunctivae normal.     SKIN- inflammatory pink papules and pustules along the jaw line with circular hyperpigmented macules and atrophic macules on the lateral cheeks.   Light brown macules on the upper cheeks and forehead  Tan skin         [facility] (Store and Forward ((National Emergency Concerning the CORONAVIRUS (COVID 19), preferred for return patients. )    Image quality and interpretability: acceptable    Physician has received verbal consent for a Video/Photos Visit from the patient? Yes    In-person dermatology visit recommendation: no    Consent has been obtained for this service by 1 care team member: yes.     Teledermatology information:  - Location of patient: Home  - Location of teledermatologist:  (PEDS DERMATOLOGY (Dr. Joseph, [place], [place])  - Reason teledermatology is appropriate:  of National Emergency Regarding Coronavirus disease (COVID 19) Outbreak  - Method of transmission:  Store and Forward ((National Emergency Concerning the CORONAVIRUS (COVID 19), preferred for return patients.   - Date of images: 06/10/20  - Service start time:916  - Service end time: 0922  - Date of report: 06/10/20    ___________________________________________________________________________    Pediatric Dermatology Clinic Note      Alva Joshi  29 year old  [id number]    CC: Patient presents with:  Teledermatology: Teledermatology with photo review.       Assessment and Plan:  1. Acne vulgaris:  Acne is inflammatory with post inflammatory pigment changes and scarring. For this reason I suspect systemic treatment will be needed. I recommended initiation of  "isotretinoin given that all other systemic agents have been trialed without success.     -UPT negative x2.  Goal cumulative dose will be 98[address]2274 mg.  -Start isotretinoin 40 mg daily       2. Medication monitoring encounter for isotretinoin:  We discussed/reviewed the potential adverse effects including, but not limited to pseudotumor cerebri, dyslipidemia, LFT abnormalities, dry skin, dry eyes, dry mouth, photosensitivity, myalgias, arthralgias and suicidal ideations.  The risk of severe birth defects with pregnancies was also reviewed.  The patient was advised not to give blood or to share his/her medication with others per the iPLEDGE protocol.        RTC in 1 months by phone with labs.      Thank you for involving me in this patient's care.     Johanna Joseph MD  Pediatric Dermatology Staff    CC:       [facility] [place] [facility]    ______________________________________________________________________    HPI:   Alva Joshi is a 29 year old female presenting for initial evaluation of acne.  Acne has been present for 10+ years. Rx sent for doxycycline last month, but did not complete this med due to side effects. Had a recent flare.        Past treatments: doxycycline, minocycline, spironolactone, OCP, clindamycin lotion.       Past Medical History:   Diagnosis Date     Endometriosis      Gastro-oesophageal reflux disease      Migraines      Numbness and tingling     right shoulder \"Pins and Needles\"     PONV (postoperative nausea and vomiting)        Allergies   Allergen Reactions     Augmentin Shortness Of Breath     Penicillins      Zithromax [Azithromycin] Diarrhea       Current Outpatient Medications   Medication     buPROPion (WELLBUTRIN XL) 150 MG 24 hr tablet     doxycycline hyclate (VIBRAMYCIN) 100 MG capsule     levonorgestrel (MIRENA) 20 MCG/24HR IUD     norethindrone-ethinyl estradiol (MICROGESTIN 1/20) 1-20 MG-MCG tablet     oxyCODONE (ROXICODONE) 5 MG tablet     No current " facility-administered medications for this visit.        Family History   Problem Relation Age of Onset     Breast Cancer Maternal Grandmother      Diabetes Paternal Grandmother      Breast Cancer Other         great grandmother, 2 maternal great aunts - dx 30's, no genetic testing     Ovarian Cancer Other         paternal great aunt     Brain Cancer Mother         glioblastoma       Social Hx:  MA at [employer]    ROS: Negative for fever, weight loss, change in appetite, bone pain/swelling, headaches, vision or hearing problems, cough, rhinorrhea, nausea, vomiting, diarrhea, or mood changes.     PHYSICAL EXAMINATION:     [facility] 05/14/2020   GENERAL:  Well appearing and well nourished, in no acute distress.     NEURO: Normal mentation and speech  PSYCH: Normal mood/affect  EYES:  Clear.  Conjunctivae normal.     SKIN- inflammatory pink papules and pustules along the jaw line with circular hyperpigmented macules and atrophic macules on the lateral cheeks.   Light brown macules on the upper cheeks and forehead  Tan skin         Per Dr. Joseph, pt has been confirmed in IPledge. Pt aware to complete the questionnaire prior to picking up the medication from the pharmacy. Follow up scheduled.    IUD  MALE CONDOMS     Johanna Joseph MD

## 2020-06-10 NOTE — PROGRESS NOTES
Per Dr. Joseph, pt has been confirmed in IPledge. Pt aware to complete the questionnaire prior to picking up the medication from the pharmacy. Follow up scheduled.    IUD  MALE CONDOMS

## 2020-06-10 NOTE — PATIENT INSTRUCTIONS
[facility]- Pediatric Dermatology  Dr. Fatemeh Santillan, Dr. Raymon White, Dr. Johanna Joseph, Janelle Paez, MARIEL Paez, Dr. Minerva Alicea & Dr. Ruy Martinez       Non Urgent  Nurse Triage Line; [phone]- Queenie and Adelaide COLLINS Care Coordinators      [place] (/Complex ) [phone]      If you need a prescription refill, please contact your pharmacy. Refills are approved or denied by our Physicians during normal business hours, Monday through Fridays    Per office policy, refills will not be granted if you have not been seen within the past year (or sooner depending on your child's condition)      Scheduling Information:     Pediatric Appointment Scheduling and Call Center [phone]   Radiology Scheduling- [phone]     Sedation Unit Scheduling- [phone]    [place] Scheduling- [facility] [phone]; Pediatric Dermatology [phone]    Main  Services: [phone]   Yi: [phone]   South Korean: [phone]   Hmong/Yakut/Welsh: [phone]      Preadmission Nursing Department Fax Number: [phone] (Fax all pre-operative paperwork to this number)      For urgent matters arising during evenings, weekends, or holidays that cannot wait for normal business hours please call [phone] and ask for the Dermatology Resident On-Call to be paged.           Start isotretinoin 40 mg daily with food

## 2020-06-15 ENCOUNTER — APPOINTMENT (OUTPATIENT)
Dept: CARDIOLOGY | Facility: CLINIC | Age: 30
End: 2020-06-15
Attending: EMERGENCY MEDICINE
Payer: COMMERCIAL

## 2020-06-15 ENCOUNTER — HOSPITAL ENCOUNTER (EMERGENCY)
Facility: CLINIC | Age: 30
Discharge: HOME | End: 2020-06-16
Attending: EMERGENCY MEDICINE | Admitting: EMERGENCY MEDICINE
Payer: COMMERCIAL

## 2020-06-15 ENCOUNTER — APPOINTMENT (OUTPATIENT)
Dept: CT IMAGING | Facility: CLINIC | Age: 30
End: 2020-06-15
Attending: EMERGENCY MEDICINE
Payer: COMMERCIAL

## 2020-06-15 VITALS
DIASTOLIC BLOOD PRESSURE: 97 MMHG | OXYGEN SATURATION: 98 % | WEIGHT: 248.4 LBS | BODY MASS INDEX: 48.51 KG/M2 | TEMPERATURE: 98.8 F | HEART RATE: 99 BPM | RESPIRATION RATE: 19 BRPM | SYSTOLIC BLOOD PRESSURE: 142 MMHG

## 2020-06-15 DIAGNOSIS — R00.2 PALPITATIONS: ICD-10-CM

## 2020-06-15 DIAGNOSIS — R00.0 SINUS TACHYCARDIA: ICD-10-CM

## 2020-06-15 LAB
ALBUMIN SERPL-MCNC: 3.6 G/DL (ref 3.4–5)
ALP SERPL-CCNC: 86 U/L (ref 40–150)
ALT SERPL W P-5'-P-CCNC: 51 U/L (ref 0–50)
ANION GAP SERPL CALCULATED.3IONS-SCNC: 8 MMOL/L (ref 3–14)
AST SERPL W P-5'-P-CCNC: 25 U/L (ref 0–45)
BASOPHILS # BLD AUTO: 0 10E9/L (ref 0–0.2)
BASOPHILS NFR BLD AUTO: 0.4 %
BILIRUB SERPL-MCNC: 0.5 MG/DL (ref 0.2–1.3)
BUN SERPL-MCNC: 6 MG/DL (ref 7–30)
CALCIUM SERPL-MCNC: 9.1 MG/DL (ref 8.5–10.1)
CHLORIDE SERPL-SCNC: 107 MMOL/L (ref 94–109)
CO2 SERPL-SCNC: 21 MMOL/L (ref 20–32)
CREAT SERPL-MCNC: 0.97 MG/DL (ref 0.52–1.04)
D DIMER PPP FEU-MCNC: 0.7 UG/ML FEU (ref 0–0.5)
DIFFERENTIAL METHOD BLD: NORMAL
EJECTION FRACTION: NORMAL %
EOSINOPHIL # BLD AUTO: 0 10E9/L (ref 0–0.7)
EOSINOPHIL NFR BLD AUTO: 0.1 %
ERYTHROCYTE [DISTWIDTH] IN BLOOD BY AUTOMATED COUNT: 12.5 % (ref 10–15)
GFR SERPL CREATININE-BSD FRML MDRD: 79 ML/MIN/{1.73_M2}
GLUCOSE SERPL-MCNC: 92 MG/DL (ref 70–99)
HCT VFR BLD AUTO: 42 % (ref 35–47)
HGB BLD-MCNC: 14.1 G/DL (ref 11.7–15.7)
IMM GRANULOCYTES # BLD: 0 10E9/L (ref 0–0.4)
IMM GRANULOCYTES NFR BLD: 0.4 %
INTERPRETATION ECG - MUSE: NORMAL
LYMPHOCYTES # BLD AUTO: 2.2 10E9/L (ref 0.8–5.3)
LYMPHOCYTES NFR BLD AUTO: 19.9 %
MCH RBC QN AUTO: 29.7 PG (ref 26.5–33)
MCHC RBC AUTO-ENTMCNC: 33.6 G/DL (ref 31.5–36.5)
MCV RBC AUTO: 89 FL (ref 78–100)
MONOCYTES # BLD AUTO: 0.6 10E9/L (ref 0–1.3)
MONOCYTES NFR BLD AUTO: 5.7 %
NEUTROPHILS # BLD AUTO: 8 10E9/L (ref 1.6–8.3)
NEUTROPHILS NFR BLD AUTO: 73.5 %
NRBC # BLD AUTO: 0 10*3/UL
NRBC BLD AUTO-RTO: 0 /100
PLATELET # BLD AUTO: 371 10E9/L (ref 150–450)
POTASSIUM SERPL-SCNC: 3.8 MMOL/L (ref 3.4–5.3)
PROT SERPL-MCNC: 7.8 G/DL (ref 6.8–8.8)
RBC # BLD AUTO: 4.74 10E12/L (ref 3.8–5.2)
SODIUM SERPL-SCNC: 136 MMOL/L (ref 133–144)
TROPONIN I SERPL-MCNC: <0.015 UG/L (ref 0–0.04)
TSH SERPL DL<=0.005 MIU/L-ACNC: 3.43 MU/L (ref 0.4–4)
WBC # BLD AUTO: 10.8 10E9/L (ref 4–11)

## 2020-06-15 PROCEDURE — 85025 COMPLETE CBC W/AUTO DIFF WBC: CPT | Performed by: EMERGENCY MEDICINE

## 2020-06-15 PROCEDURE — 99285 EMERGENCY DEPT VISIT HI MDM: CPT | Mod: 25 | Performed by: EMERGENCY MEDICINE

## 2020-06-15 PROCEDURE — 84484 ASSAY OF TROPONIN QUANT: CPT | Performed by: EMERGENCY MEDICINE

## 2020-06-15 PROCEDURE — 84443 ASSAY THYROID STIM HORMONE: CPT | Performed by: EMERGENCY MEDICINE

## 2020-06-15 PROCEDURE — 71275 CT ANGIOGRAPHY CHEST: CPT

## 2020-06-15 PROCEDURE — 93010 ELECTROCARDIOGRAM REPORT: CPT | Mod: Z6 | Performed by: EMERGENCY MEDICINE

## 2020-06-15 PROCEDURE — 80053 COMPREHEN METABOLIC PANEL: CPT | Performed by: EMERGENCY MEDICINE

## 2020-06-15 PROCEDURE — 40000556 ZZH STATISTIC PERIPHERAL IV START W US GUIDANCE

## 2020-06-15 PROCEDURE — 93005 ELECTROCARDIOGRAM TRACING: CPT | Performed by: EMERGENCY MEDICINE

## 2020-06-15 PROCEDURE — 25000128 H RX IP 250 OP 636: Performed by: STUDENT IN AN ORGANIZED HEALTH CARE EDUCATION/TRAINING PROGRAM

## 2020-06-15 PROCEDURE — 85379 FIBRIN DEGRADATION QUANT: CPT | Performed by: EMERGENCY MEDICINE

## 2020-06-15 RX ORDER — METOPROLOL SUCCINATE 50 MG/1
50 TABLET, EXTENDED RELEASE ORAL DAILY
Qty: 15 TABLET | Refills: 0 | Status: SHIPPED | OUTPATIENT
Start: 2020-06-15 | End: 2020-06-23

## 2020-06-15 RX ORDER — METOPROLOL SUCCINATE 50 MG/1
50 TABLET, EXTENDED RELEASE ORAL DAILY
Qty: 15 TABLET | Refills: 0 | Status: SHIPPED | OUTPATIENT
Start: 2020-06-15 | End: 2020-06-15

## 2020-06-15 RX ORDER — IOPAMIDOL 755 MG/ML
73 INJECTION, SOLUTION INTRAVASCULAR ONCE
Status: COMPLETED | OUTPATIENT
Start: 2020-06-15 | End: 2020-06-15

## 2020-06-15 RX ORDER — PROPRANOLOL HYDROCHLORIDE 10 MG/1
10 TABLET ORAL PRN
COMMUNITY
End: 2020-07-31

## 2020-06-15 RX ADMIN — IOPAMIDOL 73 ML: 755 INJECTION, SOLUTION INTRAVENOUS at 19:20

## 2020-06-15 NOTE — ED PROVIDER NOTES
"    [place] EMERGENCY DEPARTMENT ([facility])  6/15/20  History     Chief Complaint   Patient presents with     Palpitations     HPI  Alva Joshi is a 29 year old female with a past medical history of endometriosis, GERD, and migraines who presents to the Emergency Department for evaluation of palpitations.  Patient states that her initial symptoms started last night where he felt like her heart was pounding harder than normal.  She states that this time she felt like her heart rate was fairly normal, and attempted to fall asleep.  As the night went on she felt like her heart was beating harder.  Patient states she works at [employer]. Today she had a continuation of symtoms so she had an ECG done, and was prescribed propanolol.  She states she took 2 initially with no change, and then a third dose at 11 AM with an initial resolution of symptoms.  She states that her heart rate then picked back up and measured it at 120-130 bpm.  Patient denies any recent sicknesses.  She states she has been on an increased regimen of birth control as she has an IUD in place, and is taking Microgestin 1-20 mg-MCG tablet. Patient has also been recently started on isotretinoin 40 mg daily.  Patient denies any family history of blood clots, or abnormal arrhythmias.  No other symptoms noted.    I have reviewed the Medications, Allergies, Past Medical and Surgical History, and Social History in the [facility] system.  PAST MEDICAL HISTORY:   Past Medical History:   Diagnosis Date     Endometriosis      Gastro-oesophageal reflux disease      Migraines      Numbness and tingling     right shoulder \"Pins and Needles\"     PONV (postoperative nausea and vomiting)        PAST SURGICAL HISTORY:   Past Surgical History:   Procedure Laterality Date     ARTHROSCOPY SHOULDER Right 9/2/2015    Procedure: ARTHROSCOPY SHOULDER;  Surgeon: Rod Leiva MD;  Location:  OR     ENT SURGERY      sinus surgery     ENT SURGERY      " "deviated septum fixed     INSERT INTRAUTERINE DEVICE  12/19/2016    states also had a LSC \"pelvic clean out\" for endometriosis at this time - per op note in care everywhere, minimal endo seen/fulgarized     LAPAROSCOPIC CYSTECTOMY OVARIAN (BENIGN)  2009    ovarian cystectomy, diagnosed with endometriosis     LAPAROSCOPY  2012    had a LSC \"pelvic clean out\" for endometriosis and mirena IUD insertion       Past medical history, past surgical history, medications, and allergies were reviewed with the patient. Additional pertinent items: None    FAMILY HISTORY:   Family History   Problem Relation Age of Onset     Breast Cancer Maternal Grandmother      Diabetes Paternal Grandmother      Breast Cancer Other         great grandmother, 2 maternal great aunts - dx 30's, no genetic testing     Ovarian Cancer Other         paternal great aunt     Brain Cancer Mother         glioblastoma       SOCIAL HISTORY:   Social History     Tobacco Use     Smoking status: Never Smoker     Smokeless tobacco: Never Used   Substance Use Topics     Alcohol use: Yes     Comment: socially     Social history was reviewed with the patient. Additional pertinent items: None      Patient's Medications   New Prescriptions    No medications on file   Previous Medications    BUPROPION (WELLBUTRIN XL) 150 MG 24 HR TABLET    Take 1 tablet (150 mg) by mouth every morning    DOXYCYCLINE HYCLATE (VIBRAMYCIN) 100 MG CAPSULE    Take 1 capsule (100 mg) by mouth 2 times daily    ISOTRETINOIN (ACCUTANE) 20 MG CAPSULE    Take 2 capsules (40 mg) by mouth daily    LEVONORGESTREL (MIRENA) 20 MCG/24HR IUD    1 each by Intrauterine route once    NORETHINDRONE-ETHINYL ESTRADIOL (MICROGESTIN 1/20) 1-20 MG-MCG TABLET    Take 1 tablet by mouth daily    OXYCODONE (ROXICODONE) 5 MG TABLET    Take 1 tablet (5 mg) by mouth every 6 hours as needed for pain    PROPRANOLOL (INDERAL) 10 MG TABLET    Take 10 mg by mouth as needed 1 tablet by mouth for palpitations > 10 min, May " repeat in 30 mins.   Modified Medications    No medications on file   Discontinued Medications    No medications on file          Allergies   Allergen Reactions     Augmentin Shortness Of Breath     Penicillins      Zithromax [Azithromycin] Diarrhea        Review of Systems  ROS: 14 point ROS neg other than the symptoms noted above in the HPI.    Physical Exam   BP: (!) 151/110  Pulse: 116  Heart Rate: 98  Temp: 98.8  F (37.1  C)  Resp: 18  Weight: 112.7 kg (248 lb 6.4 oz)(scale)  SpO2: 97 %      Physical Exam  Constitutional:       General: She is not in acute distress.     Appearance: She is well-developed. She is not diaphoretic.   HENT:      Head: Normocephalic and atraumatic.      Mouth/Throat:      Pharynx: No oropharyngeal exudate.   Eyes:      General: No scleral icterus.        Right eye: No discharge.         Left eye: No discharge.      Pupils: Pupils are equal, round, and reactive to light.   Neck:      Musculoskeletal: Normal range of motion and neck supple.   Cardiovascular:      Rate and Rhythm: Regular rhythm. Tachycardia present.      Heart sounds: Normal heart sounds. No murmur. No friction rub. No gallop.    Pulmonary:      Effort: Pulmonary effort is normal. No respiratory distress.      Breath sounds: Normal breath sounds. No wheezing.   Chest:      Chest wall: No tenderness.   Abdominal:      General: Bowel sounds are normal. There is no distension.      Palpations: Abdomen is soft.      Tenderness: There is no abdominal tenderness.   Musculoskeletal: Normal range of motion.         General: No tenderness or deformity.   Skin:     General: Skin is warm and dry.      Coloration: Skin is not pale.      Findings: No erythema or rash.   Neurological:      Mental Status: She is alert and oriented to person, place, and time.      Cranial Nerves: No cranial nerve deficit.         ED Course   4:17 PM  The patient was seen and examined by Jai Sim DO in Room ED17.        Procedures             EKG  Interpretation:      Interpreted by Jai Sim DO  Time reviewed: 1607  Symptoms at time of EKG: palpitations   Rhythm: sinus tachycardia  Rate: 103  Axis: Normal  Ectopy: none  Conduction: normal  ST Segments/ T Waves: No acute ischemic changes  Q Waves: none  Comparison to prior: Earlier today, previous showed sinus tachycardia with a rate of 151.    Clinical Impression: sinus tachycardia                              Results for orders placed or performed during the hospital encounter of 06/15/20 (from the past 24 hour(s))   EKG 12-lead, tracing only   Result Value Ref Range    Interpretation ECG Click View Image link to view waveform and result    CBC with platelets differential   Result Value Ref Range    WBC 10.8 4.0 - 11.0 10e9/L    RBC Count 4.74 3.8 - 5.2 10e12/L    Hemoglobin 14.1 11.7 - 15.7 g/dL    Hematocrit 42.0 35.0 - 47.0 %    MCV 89 78 - 100 fl    MCH 29.7 26.5 - 33.0 pg    MCHC 33.6 31.5 - 36.5 g/dL    RDW 12.5 10.0 - 15.0 %    Platelet Count 371 150 - 450 10e9/L    Diff Method Automated Method     % Neutrophils 73.5 %    % Lymphocytes 19.9 %    % Monocytes 5.7 %    % Eosinophils 0.1 %    % Basophils 0.4 %    % Immature Granulocytes 0.4 %    Nucleated RBCs 0 0 /100    Absolute Neutrophil 8.0 1.6 - 8.3 10e9/L    Absolute Lymphocytes 2.2 0.8 - 5.3 10e9/L    Absolute Monocytes 0.6 0.0 - 1.3 10e9/L    Absolute Eosinophils 0.0 0.0 - 0.7 10e9/L    Absolute Basophils 0.0 0.0 - 0.2 10e9/L    Abs Immature Granulocytes 0.0 0 - 0.4 10e9/L    Absolute Nucleated RBC 0.0    Comprehensive metabolic panel   Result Value Ref Range    Sodium 136 133 - 144 mmol/L    Potassium 3.8 3.4 - 5.3 mmol/L    Chloride 107 94 - 109 mmol/L    Carbon Dioxide 21 20 - 32 mmol/L    Anion Gap 8 3 - 14 mmol/L    Glucose 92 70 - 99 mg/dL    Urea Nitrogen 6 (L) 7 - 30 mg/dL    Creatinine 0.97 0.52 - 1.04 mg/dL    GFR Estimate 79 >60 mL/min/[1.73_m2]    GFR Estimate If Black >90 >60 mL/min/[1.73_m2]    Calcium 9.1 8.5 - 10.1 mg/dL     Bilirubin Total 0.5 0.2 - 1.3 mg/dL    Albumin 3.6 3.4 - 5.0 g/dL    Protein Total 7.8 6.8 - 8.8 g/dL    Alkaline Phosphatase 86 40 - 150 U/L    ALT 51 (H) 0 - 50 U/L    AST 25 0 - 45 U/L   Troponin I   Result Value Ref Range    Troponin I ES <0.015 0.000 - 0.045 ug/L     Medications - No data to display          Assessments & Plan (with Medical Decision Making)   Is a 29-year-old female presents with palpitations.  She has no history of similar occurrences.  Darted last night.  Patient had an ECG done at work today and was found to be in sinus tachycardia with a rate of 151.  She was started on propranolol which initially resolved her symptoms but they have since returned.  No known precipitating factors.  ECG in this department shows sinus tachycardia with a rate of 103.  CBC CMP and troponin show no acute abnormalities.  TSH is not elevated.  D-dimer is 0.7.  CTA chest shows no PE or other acute abnormalities.  Echo is pending at this time.  I discussed all results with electrophysiology who recommends starting patient on 50 mg of extended release Toprol daily placing his heel patch and having her follow-up in their clinic.  Patient was signed out to incoming physician with plan for likely discharge.    I have reviewed the nursing notes.    I have reviewed the findings, diagnosis, plan and need for follow up with the patient.    New Prescriptions    No medications on file       Final diagnoses:   None   Ash NICHOLS, am serving as a trained medical scribe to document services personally performed by Jai Sim DO, based on the provider's statements to me.      Jai NICHOLS DO, was physically present and have reviewed and verified the accuracy of this note documented by Ash Mclean.    6/15/2020   [facility], [place], EMERGENCY DEPARTMENT     Jai Sim DO  06/18/20 1151       Jai Sim DO  06/19/20 1955

## 2020-06-15 NOTE — ED AVS SNAPSHOT
[facility], [place], Emergency Department  [address] [address]  Phone:  [phone]                                    Alva Joshi   MRN: [id number]    Department:  [facility], Emergency Department   Date of Visit:  6/15/2020           After Visit Summary Signature Page    I have received my discharge instructions, and my questions have been answered. I have discussed any challenges I see with this plan with the nurse or doctor.    ..........................................................................................................................................  Patient/Patient Representative Signature      ..........................................................................................................................................  Patient Representative Print Name and Relationship to Patient    ..................................................               ................................................  Date                                   Time    ..........................................................................................................................................  Reviewed by Signature/Title    ...................................................              ..............................................  Date                                               Time          22EPIC Rev 08/18

## 2020-06-15 NOTE — ED TRIAGE NOTES
Patient presents ambulatory to triage with c/o palpitations. Patient states she felt her heart racing earlier today while at [facility], had EKG done and prescribed propanolol by cardiologist. Patient states she took 3 doses of propanolol and palpitations subsided but returned around 1300. Patient states she has chest pain and difficulty taking a deep breath when she is having palpitations.

## 2020-06-16 NOTE — DISCHARGE INSTRUCTIONS
Please make an appointment to follow up with Cardiology Clinic (phone: [phone]) in 7-10 days.

## 2020-06-19 NOTE — TELEPHONE ENCOUNTER
RECORDS RECEIVED FROM: Internal   DATE RECEIVED: 6-23   NOTES STATUS DETAILS   OFFICE NOTE from referring provider    Internal ED f/u 6-15-20   OFFICE NOTE from other cardiologist    N/A    DISCHARGE SUMMARY from hospital    N/A    DISCHARGE REPORT from the ER   Internal 6-15-20 [facility]   OPERATIVE REPORT    N/A    MEDICATION LIST   Internal    LABS     BMP   N/A    CBC   Internal 6-15-20   CMP   Internal 6-15-20   Lipids   N/A    TSH   Internal 6-15-20   DIAGNOSTIC PROCEDURES     EKG   Internal 6-15-20   Monitor Reports   In process Placed 6-15-20   IMAGING (DISC & REPORT)      Echo   N/A    Stress Tests   N/A    Cath   N/A    MRI/MRA   N/A    CT/CTA   Internal 6-15-20

## 2020-06-22 NOTE — PROGRESS NOTES
"Electrophysiology Clinic Video Virtual Visit    Alva Joshi is a 29 year old female who is being evaluated via a billable video visit.      The patient has been notified of following:     \"This video visit will be conducted via a call between you and your physician/provider. We have found that certain health care needs can be provided without the need for an in-person physical exam.  This service lets us provide the care you need with a video conversation.  If a prescription is necessary we can send it directly to your pharmacy.  If lab work is needed we can place an order for that and you can then stop by our lab to have the test done at a later time.    If during the course of the call the physician/provider feels a video visit is not appropriate, you will not be charged for this service.\"     Physician has received verbal consent for a Video Visit from the patient? Yes    Patient would like the video invitation sent by: pt will be doing video on [employer]     Video Start Time: 4:02 pm    Video Stop Time: 4:23 pm    Mode of Communication:  Video Conference via [employer]    Originating Location (pt. Location): Home    Distant Location (provider location): [facility] HEART [facility]    Mode of Communication:  Video Conference via [employer]      HPI:   28 yo with palpitations.   No prior history of palpitations; she works at [employer]. She developed sudden onset of pounding sensation on Sunday 6/14/2020 as she was going to bed; checked her pulse thought it was around 110-120, didn't feel great, didn't sleep well since the pounding lasted all night. By Monday morning it was even faster, and she had nausea and diaphoresis. She went to work and got an EKG which said  bpm, she showed it to the pediatric EP there who thought it might be atrial tachycardia and gave her a prescription for propranolol 10 mg. She took 3 doses over a period of about 90 minutes; pounding subsided gradually after the 3rd dose in " about 10 minutes.  That evening she had pounding, sweating again and went to ED - she was still having symptoms when EKG done - sinus tachycardia. No specific treatment was given although she was given a prescription for metoprolol succinate 50 mg. She continued to have pounding sensation after she left ED, and didn't feel back to normal until the evening of the next day, which would be Tuesday 6/16/2020. She's been taking metoprolol daily, and has not recurrent symptoms since then. She was given a ziopatch monitor to wear from the ED and just returned it 2 days ago.    She denies anything unusual happening in her life, no excessive stress out of the ordinary. She's lost 40 lbs intentionally since February by using [employer]. Walks a mile a day with her dog. Denies taking any weight loss supplements. Does not smoke, drinks alcohol rarely, drinks 64 oz water and one cup of coffee daily, no sodas or energy drinks. Never smoked.      PAST MEDICAL HISTORY:  Endometriosis      CURRENT MEDICATIONS:  Metoprolol succinate 50 qhs  Wellbutrin  Mirena IUD  Accutane      ALLERGIES:     Allergies   Allergen Reactions     Augmentin Shortness Of Breath     Penicillins      Zithromax [Azithromycin] Diarrhea       FAMILY HISTORY:  Family History   Problem Relation Age of Onset     Breast Cancer Maternal Grandmother      Diabetes Paternal Grandmother      Breast Cancer Other         great grandmother, 2 maternal great aunts - dx 30's, no genetic testing     Ovarian Cancer Other         paternal great aunt     Brain Cancer Mother         glioblastoma       SOCIAL HISTORY:  Social History     Tobacco Use     Smoking status: Never Smoker     Smokeless tobacco: Never Used   Substance Use Topics     Alcohol use: Yes     Comment: socially     Drug use: No       ROS:  10 point ROS neg other than the symptoms noted above in the HPI.    Labs:  6/15/2020: K 3.8, cr 0.97, hgb 14, plt 371K, TSH 3.4    Testing/Procedures:  ECHOCARDIOGRAM:  6/15/2020: EF 60-65%, no valve disease    EK/15/2020 at 2:37 pm: sinus tachycardia    EXTERNAL MONITOR: PENDING      Exam:  The rest of a comprehensive physical examination is deferred due to public health emergency video visit restrictions.  CONSITUTIONAL: no acute distress  HEENT: no icterus, no redness or discharge, neck supple  CV: no visible edema of visualized extremities. No JVD.   RESPIRATORY: respirations nonlabored, no cough  NEURO: AA&Ox3, speech fluent/appropriate, motor grossly nonfocal  PSYCH: cooperative, affect appropriate  DERM: no rashes on visualized face/neck/upper extremities      Assessment and Plan:   Palpitations. EKG in ED with symptoms showed sinus tachycardia. I do not have the EKG that she took at work which showed a heart rate of 170 bpm - she will send this to me via [employer]. Will review EKG and ziopatch monitor when available. For now, she is feeling well, tolerating metoprolol succinate 50 every day. I have renewed her prescription for 3 months. Will check in with her in 2 months to see if she has any recurrences, and consider possibly stopping metoprolol and/or EPS if she has anything other than sinus tachycardia.      In addition to video time documented above, I spent an additional 15 minutes on data review and documentation.    I have reviewed the note as documented above.  This accurately captures the substance of my virtual visit with the patient. The patient states understanding and is agreeable with the plan.     Rosalinda Thomas MD  Cardiology      Addendum:           Above was her EKG from 6/15/2020: appears to be sinus tachycardia.    Rosalinda Thomas MD

## 2020-06-23 ENCOUNTER — VIRTUAL VISIT (OUTPATIENT)
Dept: CARDIOLOGY | Facility: CLINIC | Age: 30
End: 2020-06-23
Attending: INTERNAL MEDICINE
Payer: COMMERCIAL

## 2020-06-23 ENCOUNTER — PRE VISIT (OUTPATIENT)
Dept: CARDIOLOGY | Facility: CLINIC | Age: 30
End: 2020-06-23

## 2020-06-23 DIAGNOSIS — R00.2 PALPITATIONS: Primary | ICD-10-CM

## 2020-06-23 PROCEDURE — 99204 OFFICE O/P NEW MOD 45 MIN: CPT | Mod: 95 | Performed by: INTERNAL MEDICINE

## 2020-06-23 RX ORDER — METOPROLOL SUCCINATE 50 MG/1
50 TABLET, EXTENDED RELEASE ORAL DAILY
Qty: 30 TABLET | Refills: 3 | Status: SHIPPED | OUTPATIENT
Start: 2020-06-23 | End: 2020-10-27

## 2020-06-23 NOTE — PATIENT INSTRUCTIONS
"You were evaluated today in the Cardiovascular Telemedicine Clinic at the [facility].     Cardiology Provider during your visit: Dr. Rosalinda Thomas    Diagnosis:  palpitations    Results: discussed with patient    Orders:   None    Medication Changes:   None     Recommendations:   I will review EKGs and the patch monitor when I receive them.    Follow-up:   2 months - in person or video    Please follow up with primary care provider for medication refills         Please feel free to call me with any questions or concerns.       Linda Smith RN     Questions and [phone].   First press #1 for the [employer] and then press #3 for \"Medical Questions\" to reach us Cardiology Nurses.      On Call Cardiologist for after hours or on weekends: [phone]   option #4 and ask to speak to the on-call Cardiologist.          If you need a medication refill please contact your pharmacy.  Please allow 3 business days for your refill to be completed.   "

## 2020-06-23 NOTE — LETTER
6/23/2020      RE: Alva Joshi  [address] [place] [address]       Dear Colleague,    Thank you for the opportunity to participate in the care of your patient, Alva Joshi, at the [facility] HEART [facility]. Please see a copy of my visit note below.    Electrophysiology Clinic Video Virtual Visit    Alva Joshi is a 29 year old female who is being evaluated via a billable video visit.        HPI:   28 yo with palpitations.   No prior history of palpitations; she works at [employer]. She developed sudden onset of pounding sensation on Sunday 6/14/2020 as she was going to bed; checked her pulse thought it was around 110-120, didn't feel great, didn't sleep well since the pounding lasted all night. By Monday morning it was even faster, and she had nausea and diaphoresis. She went to work and got an EKG which said  bpm, she showed it to the pediatric EP there who thought it might be atrial tachycardia and gave her a prescription for propranolol 10 mg. She took 3 doses over a period of about 90 minutes; pounding subsided gradually after the 3rd dose in about 10 minutes.  That evening she had pounding, sweating again and went to ED - she was still having symptoms when EKG done - sinus tachycardia. No specific treatment was given although she was given a prescription for metoprolol succinate 50 mg. She continued to have pounding sensation after she left ED, and didn't feel back to normal until the evening of the next day, which would be Tuesday 6/16/2020. She's been taking metoprolol daily, and has not recurrent symptoms since then. She was given a ziopatch monitor to wear from the ED and just returned it 2 days ago.    She denies anything unusual happening in her life, no excessive stress out of the ordinary. She's lost 40 lbs intentionally since February by using [employer]. Walks a mile a day with her dog. Denies taking any weight loss  supplements. Does not smoke, drinks alcohol rarely, drinks 64 oz water and one cup of coffee daily, no sodas or energy drinks. Never smoked.      PAST MEDICAL HISTORY:  Endometriosis      CURRENT MEDICATIONS:  Metoprolol succinate 50 qhs  Wellbutrin  Mirena IUD  Accutane      ALLERGIES:     Allergies   Allergen Reactions     Augmentin Shortness Of Breath     Penicillins      Zithromax [Azithromycin] Diarrhea       FAMILY HISTORY:  Family History   Problem Relation Age of Onset     Breast Cancer Maternal Grandmother      Diabetes Paternal Grandmother      Breast Cancer Other         great grandmother, 2 maternal great aunts - dx 30's, no genetic testing     Ovarian Cancer Other         paternal great aunt     Brain Cancer Mother         glioblastoma       SOCIAL HISTORY:  Social History     Tobacco Use     Smoking status: Never Smoker     Smokeless tobacco: Never Used   Substance Use Topics     Alcohol use: Yes     Comment: socially     Drug use: No       ROS:  10 point ROS neg other than the symptoms noted above in the HPI.    Labs:  6/15/2020: K 3.8, cr 0.97, hgb 14, plt 371K, TSH 3.4    Testing/Procedures:  ECHOCARDIOGRAM: 6/15/2020: EF 60-65%, no valve disease    EK/15/2020 at 2:37 pm: sinus tachycardia    EXTERNAL MONITOR: PENDING      Exam:  The rest of a comprehensive physical examination is deferred due to public health emergency video visit restrictions.  CONSITUTIONAL: no acute distress  HEENT: no icterus, no redness or discharge, neck supple  CV: no visible edema of visualized extremities. No JVD.   RESPIRATORY: respirations nonlabored, no cough  NEURO: AA&Ox3, speech fluent/appropriate, motor grossly nonfocal  PSYCH: cooperative, affect appropriate  DERM: no rashes on visualized face/neck/upper extremities      Assessment and Plan:   Palpitations. EKG in ED with symptoms showed sinus tachycardia. I do not have the EKG that she took at work which showed a heart rate of 170 bpm - she will send this to  me via [employer]. Will review EKG and ziopatch monitor when available. For now, she is feeling well, tolerating metoprolol succinate 50 every day. I have renewed her prescription for 3 months. Will check in with her in 2 months to see if she has any recurrences, and consider possibly stopping metoprolol and/or EPS if she has anything other than sinus tachycardia.      In addition to video time documented above, I spent an additional 15 minutes on data review and documentation.    I have reviewed the note as documented above.  This accurately captures the substance of my virtual visit with the patient. The patient states understanding and is agreeable with the plan.     Rosalinda Thomas MD  Cardiology      Addendum:           Above was her EKG from 6/15/2020: appears to be sinus tachycardia.    Please do not hesitate to contact me if you have any questions/concerns.     Sincerely,     Rosalinda Thomas MD

## 2020-07-07 ENCOUNTER — TELEPHONE (OUTPATIENT)
Dept: DERMATOLOGY | Facility: CLINIC | Age: 30
End: 2020-07-07

## 2020-07-07 DIAGNOSIS — L70.0 ACNE VULGARIS: ICD-10-CM

## 2020-07-07 LAB — HCG UR QL: NEGATIVE

## 2020-07-07 PROCEDURE — 81025 URINE PREGNANCY TEST: CPT | Performed by: DERMATOLOGY

## 2020-07-07 NOTE — PATIENT INSTRUCTIONS
[facility]- Pediatric Dermatology  Dr. Fatemeh Santillan, Dr. Raymon White, Dr. Johanna Joseph, Janelle Paez, MARIEL Paez, Dr. Minerva Alicea & Dr. Ruy Martinez       Non Urgent  Nurse Triage Line; [phone]- Queenie and Adelaide COLLINS Care Coordinators      Gricelda (/Complex ) [phone]      If you need a prescription refill, please contact your pharmacy. Refills are approved or denied by our Physicians during normal business hours, Monday through Fridays    Per office policy, refills will not be granted if you have not been seen within the past year (or sooner depending on your child's condition)      Scheduling Information:     Pediatric Appointment Scheduling and Call Center [phone]   Radiology Scheduling- [phone]     Sedation Unit Scheduling- [phone]    [place] Scheduling- [facility] [phone]; Pediatric Dermatology [phone]    Main  Services: [phone]   Lithuanian: [phone]   Malian: [phone]   Hmong/Japanese/Arabic: [phone]      Preadmission Nursing Department Fax Number: [phone] (Fax all pre-operative paperwork to this number)      For urgent matters arising during evenings, weekends, or holidays that cannot wait for normal business hours please call [phone] and ask for the Dermatology Resident On-Call to be paged.

## 2020-07-07 NOTE — PROGRESS NOTES
"Alva is a 29 year old female who is being evaluated via a billable teledermatology visit.             The patient has been notified of following:            \"We have asked you to send in photos via [employer] or e-mail. These photos will be seen and reviewed by an MD or PANeoC.  A telederm visit is not as thorough as an in-person visit, photo assessment does not replace an in-person skin exam.  The quality of the photograph sent may not be of the same quality as that taken by the dermatology clinic. With that being said, we have found that certain health care needs can be provided without the need for a physical exam.  This service lets us provide the care you need with a short phone conversation. If prescriptions are needed we can send directly to your pharmacy.If lab work is needed we can place an order for that and you can then stop by our lab to have the test done at a later time. An MD/PA/Resident will call you around the time of your visit. This may be from a blocked number.     This is a billable visit. If during the course of the call the physician/provider feels a telephone visit is not appropriate, you will not be charged for this service.            Patient has given verbal consent for Telephone visit?  Yes           The patient would like to proceed with an teledermatology because of the COVID Pandemic.     Pediatric Dermatology Clinic - Accutane Questionaire    Dry Lips: Moderate    Dry or Blood Shot Eyes: Mild    Dry Skin: Mild    Muscle Aches or Pains: No    Nose Bleeds: No    Frequent Headaches: No    Mood Swings: No    Depression: No    Suicidal Thoughts: No    Toenail/Fingernail Inflammation: No    Rash: No    Trouble with Night Vision: No    Severe Sun Sensitivity or Sunburn: No    School or Social problems: No    Change in past medical, family or social history: No    I am aware that I should not share medications or donate blood while taking these medications: Yes    Severity of Acne per scale: " Almost Clear    Improvement since the beginning of medication use, on the scale: Marked Almost Clear    Survey completed by:  Patient      Patient complains of    Accutane follow up       ALLERGIES REVIEWED?  yes

## 2020-07-08 ENCOUNTER — VIRTUAL VISIT (OUTPATIENT)
Dept: DERMATOLOGY | Facility: CLINIC | Age: 30
End: 2020-07-08
Attending: DERMATOLOGY
Payer: COMMERCIAL

## 2020-07-08 DIAGNOSIS — L70.0 ACNE VULGARIS: ICD-10-CM

## 2020-07-08 DIAGNOSIS — Z51.81 MEDICATION MONITORING ENCOUNTER: ICD-10-CM

## 2020-07-08 DIAGNOSIS — L21.9 DERMATITIS, SEBORRHEIC: Primary | ICD-10-CM

## 2020-07-08 DIAGNOSIS — L30.9 DERMATITIS: ICD-10-CM

## 2020-07-08 LAB
BASOPHILS # BLD AUTO: 0 10E9/L (ref 0–0.2)
BASOPHILS NFR BLD AUTO: 0.1 %
DIFFERENTIAL METHOD BLD: NORMAL
EOSINOPHIL # BLD AUTO: 0.2 10E9/L (ref 0–0.7)
EOSINOPHIL NFR BLD AUTO: 2 %
ERYTHROCYTE [DISTWIDTH] IN BLOOD BY AUTOMATED COUNT: 13.5 % (ref 10–15)
HCT VFR BLD AUTO: 41.8 % (ref 35–47)
HGB BLD-MCNC: 14.4 G/DL (ref 11.7–15.7)
LYMPHOCYTES # BLD AUTO: 2.9 10E9/L (ref 0.8–5.3)
LYMPHOCYTES NFR BLD AUTO: 30.7 %
MCH RBC QN AUTO: 30.5 PG (ref 26.5–33)
MCHC RBC AUTO-ENTMCNC: 34.4 G/DL (ref 31.5–36.5)
MCV RBC AUTO: 89 FL (ref 78–100)
MONOCYTES # BLD AUTO: 0.5 10E9/L (ref 0–1.3)
MONOCYTES NFR BLD AUTO: 4.9 %
NEUTROPHILS # BLD AUTO: 5.8 10E9/L (ref 1.6–8.3)
NEUTROPHILS NFR BLD AUTO: 62.3 %
PLATELET # BLD AUTO: 221 10E9/L (ref 150–450)
RBC # BLD AUTO: 4.72 10E12/L (ref 3.8–5.2)
WBC # BLD AUTO: 9.3 10E9/L (ref 4–11)

## 2020-07-08 PROCEDURE — 85025 COMPLETE CBC W/AUTO DIFF WBC: CPT | Performed by: DERMATOLOGY

## 2020-07-08 PROCEDURE — 36415 COLL VENOUS BLD VENIPUNCTURE: CPT | Performed by: DERMATOLOGY

## 2020-07-08 PROCEDURE — 80053 COMPREHEN METABOLIC PANEL: CPT | Performed by: DERMATOLOGY

## 2020-07-08 PROCEDURE — 80061 LIPID PANEL: CPT | Performed by: DERMATOLOGY

## 2020-07-08 RX ORDER — MOMETASONE FUROATE 1 MG/ML
SOLUTION TOPICAL
Qty: 60 ML | Refills: 3 | Status: SHIPPED | OUTPATIENT
Start: 2020-07-08 | End: 2021-04-16

## 2020-07-08 RX ORDER — ISOTRETINOIN 40 MG/1
80 CAPSULE ORAL DAILY
Qty: 60 CAPSULE | Refills: 0 | Status: SHIPPED | OUTPATIENT
Start: 2020-07-08 | End: 2021-04-16

## 2020-07-08 RX ORDER — KETOCONAZOLE 20 MG/ML
SHAMPOO TOPICAL
Qty: 120 ML | Refills: 11 | Status: SHIPPED | OUTPATIENT
Start: 2020-07-08 | End: 2021-04-16

## 2020-07-08 RX ORDER — HYDROCORTISONE 25 MG/G
OINTMENT TOPICAL
Qty: 20 G | Refills: 2 | Status: SHIPPED | OUTPATIENT
Start: 2020-07-08 | End: 2021-04-16

## 2020-07-08 NOTE — PROGRESS NOTES
[facility] ([facility] Forward ((National Emergency Concerning the CORONAVIRUS (COVID 19), preferred for return patients. )    Image quality and interpretability: acceptable    Physician has received verbal consent for a Video/Photos Visit from the patient? Yes    In-person dermatology visit recommendation: no    Consent has been obtained for this service by 1 care team member: yes.     Teledermatology information:  - Location of patient: Home  - Location of teledermatologist:  (PEDS DERMATOLOGY (Dr. Joseph, [place], [place])  - Reason teledermatology is appropriate:  of National Emergency Regarding Coronavirus disease (COVID 19) Outbreak  - Method of transmission:  Store and Forward ((National Emergency Concerning the CORONAVIRUS (COVID 19), preferred for return patients.   - Date of images: 07/08/20  - Service start time:0848  - Service end time: 0856  - Date of report: 07/08/20      ___________________________________________________________      Pediatric Dermatology Clinic Note      Alva Joshi  29 year old  [id number]    CC: Patient presents with:  Teledermatology: phone visit with photo review; Accutane       Assessment and Plan:  1. Acne vulgaris:  Acne is inflammatory with post inflammatory pigment changes and scarring. Excellent improvement on isotretinoin for 1 month. Minimal side effects.     -UPT negative x2.  Goal cumulative dose will be 98668-61665 mg.  -Increase isotretinoin to 80 mg daily  -Cumulative dose is 1200 mg        2. Medication monitoring encounter for isotretinoin:  We discussed/reviewed the potential adverse effects including, but not limited to pseudotumor cerebri, dyslipidemia, LFT abnormalities, dry skin, dry eyes, dry mouth, photosensitivity, myalgias, arthralgias and suicidal ideations.  The risk of severe birth defects with pregnancies was also reviewed.  The patient was advised not to give blood or to share his/her medication with others per the iPLEDGE protocol.   "  Labs today    3. Cheilitis: hydrocortisone 2.5% ointment bid as needed. Vaseline frequently to lips.     4. Seborrheic dermatitis of the scalp: ketoconazole shampoo three times per week and mometasone solution nightly as needed.     RTC in 1 months by phone with labs.      Thank you for involving me in this patient's care.     Johanna Joseph MD  Pediatric Dermatology Staff    CC:       [facility]    ______________________________________________________________________    HPI:   Alva Joshi is a 29 year old female presenting for reevaluation of acne s/p 1 month of isotretinoin at 40 mg daily. Has had decreased acne with only one new lesion. +dry lips and dry eyes. Has dry itching scalp. Notes that she is being cautious in the sun.         Past treatments: doxycycline, minocycline, spironolactone, OCP, clindamycin lotion.       Past Medical History:   Diagnosis Date     Endometriosis      Gastro-oesophageal reflux disease      Migraines      Numbness and tingling     right shoulder \"Pins and Needles\"     PONV (postoperative nausea and vomiting)        Allergies   Allergen Reactions     Augmentin Shortness Of Breath     Penicillins      Zithromax [Azithromycin] Diarrhea       Current Outpatient Medications   Medication     buPROPion (WELLBUTRIN XL) 150 MG 24 hr tablet     doxycycline hyclate (VIBRAMYCIN) 100 MG capsule     ISOtretinoin (ACCUTANE) 20 MG capsule     levonorgestrel (MIRENA) 20 MCG/24HR IUD     metoprolol succinate ER (TOPROL-XL) 50 MG 24 hr tablet     norethindrone-ethinyl estradiol (MICROGESTIN 1/20) 1-20 MG-MCG tablet     oxyCODONE (ROXICODONE) 5 MG tablet     propranolol (INDERAL) 10 MG tablet     No current facility-administered medications for this visit.        Family History   Problem Relation Age of Onset     Breast Cancer Maternal Grandmother      Diabetes Paternal Grandmother      Breast Cancer Other         great grandmother, 2 maternal great aunts - dx 30's, no " genetic testing     Ovarian Cancer Other         paternal great aunt     Brain Cancer Mother         glioblastoma       Social Hx:  MA at [employer]    ROS: Negative for fever, weight loss, change in appetite, bone pain/swelling, headaches, vision or hearing problems, cough, rhinorrhea, nausea, vomiting, diarrhea, or mood changes.     PHYSICAL EXAMINATION:     There were no vitals taken for this visit.  GENERAL:  Well appearing and well nourished, in no acute distress.     NEURO: Normal mentation and speech  PSYCH: Normal mood/affect  EYES:  Clear.  Conjunctivae normal.     SKIN-  Light brown macules on the upper cheeks and forehead  Pink papule on the L lower chin

## 2020-07-08 NOTE — LETTER
Date:July 9, 2020      Patient was self referred, no letter generated. Do not send.        [facility] Health Information

## 2020-07-08 NOTE — LETTER
"  7/8/2020      RE: Alva Joshi  [address] [place] [address]       Alva is a 29 year old female who is being evaluated via a billable teledermatology visit.             The patient has been notified of following:            \"We have asked you to send in photos via b[employer] or e-mail. These photos will be seen and reviewed by an MD or PA-C.  A telederm visit is not as thorough as an in-person visit, photo assessment does not replace an in-person skin exam.  The quality of the photograph sent may not be of the same quality as that taken by the dermatology clinic. With that being said, we have found that certain health care needs can be provided without the need for a physical exam.  This service lets us provide the care you need with a short phone conversation. If prescriptions are needed we can send directly to your pharmacy.If lab work is needed we can place an order for that and you can then stop by our lab to have the test done at a later time. An MD/PA/Resident will call you around the time of your visit. This may be from a blocked number.     This is a billable visit. If during the course of the call the physician/provider feels a telephone visit is not appropriate, you will not be charged for this service.            Patient has given verbal consent for Telephone visit?  Yes           The patient would like to proceed with an teledermatology because of the COVID Pandemic.     Pediatric Dermatology Clinic - Accutane Questionaire    Dry Lips: Moderate    Dry or Blood Shot Eyes: Mild    Dry Skin: Mild    Muscle Aches or Pains: No    Nose Bleeds: No    Frequent Headaches: No    Mood Swings: No    Depression: No    Suicidal Thoughts: No    Toenail/Fingernail Inflammation: No    Rash: No    Trouble with Night Vision: No    Severe Sun Sensitivity or Sunburn: No    School or Social problems: No    Change in past medical, family or social history: No    I am aware that I should not share medications or " donate blood while taking these medications: Yes    Severity of Acne per scale: Almost Clear    Improvement since the beginning of medication use, on the scale: Marked Almost Clear    Survey completed by:  Patient      Patient complains of    Accutane follow up       ALLERGIES REVIEWED?  yes    [facility]Store [facility] Forward ((National Emergency Concerning the CORONAVIRUS (COVID 19), preferred for return patients. )    Image quality and interpretability: acceptable    Physician has received verbal consent for a Video/Photos Visit from the patient? Yes    In-person dermatology visit recommendation: no    Consent has been obtained for this service by 1 care team member: yes.     Teledermatology information:  - Location of patient: Home  - Location of teledermatologist:  (PEDS DERMATOLOGY (Dr. Joseph, [place], [place])  - Reason teledermatology is appropriate:  of National Emergency Regarding Coronavirus disease (COVID 19) Outbreak  - Method of transmission:  Store and Forward ((National Emergency Concerning the CORONAVIRUS (COVID 19), preferred for return patients.   - Date of images: 07/08/20  - Service start time:0848  - Service end time: 0856  - Date of report: 07/08/20      ___________________________________________________________      Pediatric Dermatology Clinic Note      Alva Joshi  29 year old  [id number]    CC: Patient presents with:  Teledermatology: phone visit with photo review; Accutane       Assessment and Plan:  1. Acne vulgaris:  Acne is inflammatory with post inflammatory pigment changes and scarring. Excellent improvement on isotretinoin for 1 month. Minimal side effects.     -UPT negative x2.  Goal cumulative dose will be 22891-98091 mg.  -Increase isotretinoin to 80 mg daily  -Cumulative dose is 1200 mg        2. Medication monitoring encounter for isotretinoin:  We discussed/reviewed the potential adverse effects including, but not limited to pseudotumor cerebri, dyslipidemia, LFT  "abnormalities, dry skin, dry eyes, dry mouth, photosensitivity, myalgias, arthralgias and suicidal ideations.  The risk of severe birth defects with pregnancies was also reviewed.  The patient was advised not to give blood or to share his/her medication with others per the iPLEDGE protocol.    Labs today    3. Cheilitis: hydrocortisone 2.5% ointment bid as needed. Vaseline frequently to lips.     4. Seborrheic dermatitis of the scalp: ketoconazole shampoo three times per week and mometasone solution nightly as needed.     RTC in 1 months by phone with labs.      Thank you for involving me in this patient's care.     Johanna Joseph MD  Pediatric Dermatology Staff    CC:       [facility], [facility]    ______________________________________________________________________    HPI:   Alva Joshi is a 29 year old female presenting for reevaluation of acne s/p 1 month of isotretinoin at 40 mg daily. Has had decreased acne with only one new lesion. +dry lips and dry eyes. Has dry itching scalp. Notes that she is being cautious in the sun.         Past treatments: doxycycline, minocycline, spironolactone, OCP, clindamycin lotion.       Past Medical History:   Diagnosis Date     Endometriosis      Gastro-oesophageal reflux disease      Migraines      Numbness and tingling     right shoulder \"Pins and Needles\"     PONV (postoperative nausea and vomiting)        Allergies   Allergen Reactions     Augmentin Shortness Of Breath     Penicillins      Zithromax [Azithromycin] Diarrhea       Current Outpatient Medications   Medication     buPROPion (WELLBUTRIN XL) 150 MG 24 hr tablet     doxycycline hyclate (VIBRAMYCIN) 100 MG capsule     ISOtretinoin (ACCUTANE) 20 MG capsule     levonorgestrel (MIRENA) 20 MCG/24HR IUD     metoprolol succinate ER (TOPROL-XL) 50 MG 24 hr tablet     norethindrone-ethinyl estradiol (MICROGESTIN 1/20) 1-20 MG-MCG tablet     oxyCODONE (ROXICODONE) 5 MG tablet     propranolol (INDERAL) 10 MG " tablet     No current facility-administered medications for this visit.        Family History   Problem Relation Age of Onset     Breast Cancer Maternal Grandmother      Diabetes Paternal Grandmother      Breast Cancer Other         great grandmother, 2 maternal great aunts - dx 30's, no genetic testing     Ovarian Cancer Other         paternal great aunt     Brain Cancer Mother         glioblastoma       Social Hx:  MA at [employer]    ROS: Negative for fever, weight loss, change in appetite, bone pain/swelling, headaches, vision or hearing problems, cough, rhinorrhea, nausea, vomiting, diarrhea, or mood changes.     PHYSICAL EXAMINATION:     There were no vitals taken for this visit.  GENERAL:  Well appearing and well nourished, in no acute distress.     NEURO: Normal mentation and speech  PSYCH: Normal mood/affect  EYES:  Clear.  Conjunctivae normal.     SKIN-  Light brown macules on the upper cheeks and forehead  Pink papule on the L lower chin        Confirmed pt in IPledge per Dr. Joseph's verbal consent.    MI Garces MD

## 2020-07-09 LAB
ALBUMIN SERPL-MCNC: 3.5 G/DL (ref 3.4–5)
ALP SERPL-CCNC: 86 U/L (ref 40–150)
ALT SERPL W P-5'-P-CCNC: 25 U/L (ref 0–50)
ANION GAP SERPL CALCULATED.3IONS-SCNC: 3 MMOL/L (ref 3–14)
AST SERPL W P-5'-P-CCNC: 16 U/L (ref 0–45)
BILIRUB SERPL-MCNC: 0.2 MG/DL (ref 0.2–1.3)
BUN SERPL-MCNC: 9 MG/DL (ref 7–30)
CALCIUM SERPL-MCNC: 8.4 MG/DL (ref 8.5–10.1)
CHLORIDE SERPL-SCNC: 109 MMOL/L (ref 94–109)
CHOLEST SERPL-MCNC: 235 MG/DL
CO2 SERPL-SCNC: 26 MMOL/L (ref 20–32)
CREAT SERPL-MCNC: 0.96 MG/DL (ref 0.52–1.04)
GFR SERPL CREATININE-BSD FRML MDRD: 80 ML/MIN/{1.73_M2}
GLUCOSE SERPL-MCNC: 104 MG/DL (ref 70–99)
HDLC SERPL-MCNC: 41 MG/DL
LDLC SERPL CALC-MCNC: 164 MG/DL
NONHDLC SERPL-MCNC: 194 MG/DL
POTASSIUM SERPL-SCNC: 4 MMOL/L (ref 3.4–5.3)
PROT SERPL-MCNC: 7.1 G/DL (ref 6.8–8.8)
SODIUM SERPL-SCNC: 138 MMOL/L (ref 133–144)
TRIGL SERPL-MCNC: 151 MG/DL

## 2020-07-31 ENCOUNTER — TELEPHONE (OUTPATIENT)
Dept: FAMILY MEDICINE | Facility: CLINIC | Age: 30
End: 2020-07-31

## 2020-07-31 ENCOUNTER — VIRTUAL VISIT (OUTPATIENT)
Dept: FAMILY MEDICINE | Facility: CLINIC | Age: 30
End: 2020-07-31
Payer: COMMERCIAL

## 2020-07-31 DIAGNOSIS — F33.1 MODERATE EPISODE OF RECURRENT MAJOR DEPRESSIVE DISORDER (H): Primary | ICD-10-CM

## 2020-07-31 PROCEDURE — 99213 OFFICE O/P EST LOW 20 MIN: CPT | Mod: TEL | Performed by: PHYSICIAN ASSISTANT

## 2020-07-31 RX ORDER — BUPROPION HYDROCHLORIDE 300 MG/1
300 TABLET ORAL EVERY MORNING
Qty: 90 TABLET | Refills: 1 | Status: SHIPPED | OUTPATIENT
Start: 2020-07-31 | End: 2021-01-24

## 2020-07-31 NOTE — TELEPHONE ENCOUNTER
Called patients pharmacy and informed them of provider note below. Pharmacist verbalized understanding and agrees with plan.     Rosalinda Bennett RN, BSN  [facility] [place]

## 2020-07-31 NOTE — PROGRESS NOTES
"Alva Joshi is a 30 year old female who is being evaluated via a billable video visit.      The patient has been notified of following:     \"This video visit will be conducted via a call between you and your physician/provider. We have found that certain health care needs can be provided without the need for an in-person physical exam.  This service lets us provide the care you need with a video conversation.  If a prescription is necessary we can send it directly to your pharmacy.  If lab work is needed we can place an order for that and you can then stop by our lab to have the test done at a later time.    Video visits are billed at different rates depending on your insurance coverage.  Please reach out to your insurance provider with any questions.    If during the course of the call the physician/provider feels a video visit is not appropriate, you will not be charged for this service.\"    Patient has given verbal consent for Video visit? Yes  How would you like to obtain your AVS? MyChart  If you are dropped from the video visit, the video invite should be resent to: Text to cell phone: [phone]  Will anyone else be joining your video visit? No    Subjective     Alva Joshi is a 30 year old female who presents today via video visit for the following health issues:    HPI    Depression and Anxiety Follow-Up    How are you doing with your depression since your last visit? Improved     How are you doing with your anxiety since your last visit?  Was better but now back to starting     Are you having other symptoms that might be associated with depression or anxiety? No    Have you had a significant life event? No     Do you have any concerns with your use of alcohol or other drugs? No    Social History     Tobacco Use     Smoking status: Never Smoker     Smokeless tobacco: Never Used   Substance Use Topics     Alcohol use: Yes     Comment: socially     Drug use: No     PHQ 2/25/2020 3/10/2020 7/29/2020 "   PHQ-9 Total Score 3 4 5   Q9: Thoughts of better off dead/self-harm past 2 weeks Not at all Not at all Not at all     JOSE MIGUEL-7 SCORE 2/25/2020 3/10/2020 7/29/2020   Total Score - - 6 (mild anxiety)   Total Score 4 3 6     Last PHQ-9 7/29/2020   1.  Little interest or pleasure in doing things 1   2.  Feeling down, depressed, or hopeless 1   3.  Trouble falling or staying asleep, or sleeping too much 2   4.  Feeling tired or having little energy 1   5.  Poor appetite or overeating 0   6.  Feeling bad about yourself 0   7.  Trouble concentrating 0   8.  Moving slowly or restless 0   Q9: Thoughts of better off dead/self-harm past 2 weeks 0   PHQ-9 Total Score 5   Difficulty at work, home, or with people -     JOSE MIGUEL-7  7/29/2020   1. Feeling nervous, anxious, or on edge 1   2. Not being able to stop or control worrying 1   3. Worrying too much about different things 1   4. Trouble relaxing 1   5. Being so restless that it is hard to sit still 0   6. Becoming easily annoyed or irritable 1   7. Feeling afraid, as if something awful might happen 1   JOSE MIGUEL-7 Total Score 6   If you checked any problems, how difficult have they made it for you to do your work, take care of things at home, or get along with other people? -       Suicide Assessment Five-step Evaluation and Treatment (SAFE-T)      How many servings of fruits and vegetables do you eat daily?  2-3    On average, how many sweetened beverages do you drink each day (Examples: soda, juice, sweet tea, etc.  Do NOT count diet or artificially sweetened beverages)?   1    How many days per week do you exercise enough to make your heart beat faster? 4    How many minutes a day do you exercise enough to make your heart beat faster? 92 - [phone]    How many days per week do you miss taking your medication? 0        Video Start Time: 4:50 PM      Since starting Wellbutrin  mg Alva feels like her depression has been improving though she still feels that there is room for  "improvement.  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 Her mother is currently being treated for cancer and recently found out she is in remission.  She noticed that her mood improved quite quickly after starting Wellbutrin but the effects of the medication seem to have plateaud.  She  Is interested in increasing her dose of medication        Patient Active Problem List   Diagnosis     Endometriosis     Migraines     Encounter for IUD insertion     Family history of malignant neoplasm of breast     Morbid obesity (H)     Past Surgical History:   Procedure Laterality Date     ARTHROSCOPY SHOULDER Right 9/2/2015    Procedure: ARTHROSCOPY SHOULDER;  Surgeon: Rod Leiva MD;  Location: [facility] OR     ENT SURGERY      sinus surgery     ENT SURGERY      deviated septum fixed     INSERT INTRAUTERINE DEVICE  12/19/2016    states also had a LSC \"pelvic clean out\" for endometriosis at this time - per op note in care everywhere, minimal endo seen/fulgarized     LAPAROSCOPIC CYSTECTOMY OVARIAN (BENIGN)  2009    ovarian cystectomy, diagnosed with endometriosis     LAPAROSCOPY  2012    had a LSC \"pelvic clean out\" for endometriosis and mirena IUD insertion       Social History     Tobacco Use     Smoking status: Never Smoker     Smokeless tobacco: Never Used   Substance Use Topics     Alcohol use: Yes     Comment: socially     Family History   Problem Relation Age of Onset     Breast Cancer Maternal Grandmother      Diabetes Paternal Grandmother      Breast Cancer Other         great grandmother, 2 maternal great aunts - dx 30's, no genetic testing     Ovarian Cancer Other         paternal great aunt     Brain Cancer Mother         glioblastoma     Brain Cancer Maternal Uncle          Current Outpatient Medications   Medication Sig Dispense Refill     buPROPion (WELLBUTRIN XL) 300 MG 24 hr tablet Take 1 tablet (300 mg) by mouth every morning 90 tablet 1     hydrocortisone 2.5 % ointment To lip rash " twice daily as needed. 20 g 2     ISOtretinoin (ACCUTANE) 40 MG capsule Take 2 capsules (80 mg) by mouth daily 60 capsule 0     ketoconazole (NIZORAL) 2 % external shampoo Use to shampoo biw. Leave on 5 min then rinse. 120 mL 11     levonorgestrel (MIRENA) 20 MCG/24HR IUD 1 each by Intrauterine route once       metoprolol succinate ER (TOPROL-XL) 50 MG 24 hr tablet Take 1 tablet (50 mg) by mouth daily 30 tablet 3     mometasone (ELOCON) 0.1 % external solution Apply to scalp nightly as needed. 60 mL 3     Allergies   Allergen Reactions     Augmentin Shortness Of Breath     Penicillins      Zithromax [Azithromycin] Diarrhea       Reviewed and updated as needed this visit by Provider         Review of Systems   Constitutional, HEENT, cardiovascular, pulmonary, gi and gu systems are negative, except as otherwise noted.      Objective             Physical Exam     GENERAL: Healthy, alert and no distress  EYES: Eyes grossly normal to inspection.  No discharge or erythema, or obvious scleral/conjunctival abnormalities.  RESP: No audible wheeze, cough, or visible cyanosis.  No visible retractions or increased work of breathing.    SKIN: Visible skin clear. No significant rash, abnormal pigmentation or lesions.  NEURO: Cranial nerves grossly intact.  Mentation and speech appropriate for age.  PSYCH: Mentation appears normal, affect normal/bright, judgement and insight intact, normal speech and appearance well-groomed.      Diagnostic Test Results:  none         Assessment & Plan     1. Moderate episode of recurrent major depressive disorder (H)  Will increase dose to 300 mg daily.  She will follow up in 6 weeks with an update and in 6 months for OV.  All questions answered  - buPROPion (WELLBUTRIN XL) 300 MG 24 hr tablet; Take 1 tablet (300 mg) by mouth every morning  Dispense: 90 tablet; Refill: 1       Return in about 6 months (around 1/31/2021) for Routine Visit.    Lexa Marshall PA-C  [facility]  SAMANTHA      Video-Visit Details    Type of service:  Video Visit    Video End Time:5:01 PM    Originating Location (pt. Location): Home    Distant Location (provider location):  [facility] [employer] and [employer]     Platform used for Video Visit: Unable to complete video visit, used telephone    No follow-ups on file.         Lexa Marshall PA-C

## 2020-08-05 ENCOUNTER — VIRTUAL VISIT (OUTPATIENT)
Dept: DERMATOLOGY | Facility: CLINIC | Age: 30
End: 2020-08-05
Attending: DERMATOLOGY
Payer: COMMERCIAL

## 2020-08-05 ENCOUNTER — APPOINTMENT (OUTPATIENT)
Dept: LAB | Facility: CLINIC | Age: 30
End: 2020-08-05
Attending: DERMATOLOGY
Payer: COMMERCIAL

## 2020-08-05 DIAGNOSIS — L70.0 ACNE VULGARIS: ICD-10-CM

## 2020-08-05 DIAGNOSIS — Z51.81 MEDICATION MONITORING ENCOUNTER: Primary | ICD-10-CM

## 2020-08-05 LAB
ALBUMIN SERPL-MCNC: 3.5 G/DL (ref 3.4–5)
ALP SERPL-CCNC: 84 U/L (ref 40–150)
ALT SERPL W P-5'-P-CCNC: 28 U/L (ref 0–50)
ANION GAP SERPL CALCULATED.3IONS-SCNC: 6 MMOL/L (ref 3–14)
AST SERPL W P-5'-P-CCNC: 19 U/L (ref 0–45)
BASOPHILS # BLD AUTO: 0 10E9/L (ref 0–0.2)
BASOPHILS NFR BLD AUTO: 0.3 %
BILIRUB SERPL-MCNC: 0.6 MG/DL (ref 0.2–1.3)
BUN SERPL-MCNC: 11 MG/DL (ref 7–30)
CALCIUM SERPL-MCNC: 8.7 MG/DL (ref 8.5–10.1)
CHLORIDE SERPL-SCNC: 109 MMOL/L (ref 94–109)
CHOLEST SERPL-MCNC: 228 MG/DL
CO2 SERPL-SCNC: 23 MMOL/L (ref 20–32)
CREAT SERPL-MCNC: 0.89 MG/DL (ref 0.52–1.04)
DIFFERENTIAL METHOD BLD: NORMAL
EOSINOPHIL # BLD AUTO: 0.5 10E9/L (ref 0–0.7)
EOSINOPHIL NFR BLD AUTO: 5.1 %
ERYTHROCYTE [DISTWIDTH] IN BLOOD BY AUTOMATED COUNT: 13.5 % (ref 10–15)
GFR SERPL CREATININE-BSD FRML MDRD: 87 ML/MIN/{1.73_M2}
GLUCOSE SERPL-MCNC: 96 MG/DL (ref 70–99)
HCG SERPL QL: NEGATIVE
HCT VFR BLD AUTO: 42.4 % (ref 35–47)
HDLC SERPL-MCNC: 34 MG/DL
HGB BLD-MCNC: 14.3 G/DL (ref 11.7–15.7)
IMM GRANULOCYTES # BLD: 0 10E9/L (ref 0–0.4)
IMM GRANULOCYTES NFR BLD: 0.2 %
LDLC SERPL CALC-MCNC: 150 MG/DL
LYMPHOCYTES # BLD AUTO: 3.7 10E9/L (ref 0.8–5.3)
LYMPHOCYTES NFR BLD AUTO: 39.1 %
MCH RBC QN AUTO: 30.8 PG (ref 26.5–33)
MCHC RBC AUTO-ENTMCNC: 33.7 G/DL (ref 31.5–36.5)
MCV RBC AUTO: 91 FL (ref 78–100)
MONOCYTES # BLD AUTO: 0.4 10E9/L (ref 0–1.3)
MONOCYTES NFR BLD AUTO: 4.7 %
NEUTROPHILS # BLD AUTO: 4.7 10E9/L (ref 1.6–8.3)
NEUTROPHILS NFR BLD AUTO: 50.6 %
NONHDLC SERPL-MCNC: 194 MG/DL
NRBC # BLD AUTO: 0 10*3/UL
NRBC BLD AUTO-RTO: 0 /100
PLATELET # BLD AUTO: 300 10E9/L (ref 150–450)
POTASSIUM SERPL-SCNC: 3.9 MMOL/L (ref 3.4–5.3)
PROT SERPL-MCNC: 7.3 G/DL (ref 6.8–8.8)
RBC # BLD AUTO: 4.65 10E12/L (ref 3.8–5.2)
SODIUM SERPL-SCNC: 138 MMOL/L (ref 133–144)
TRIGL SERPL-MCNC: 218 MG/DL
WBC # BLD AUTO: 9.4 10E9/L (ref 4–11)

## 2020-08-05 PROCEDURE — 40001009 ZZH VIDEO/TELEPHONE VISIT; NO CHARGE

## 2020-08-05 PROCEDURE — 85025 COMPLETE CBC W/AUTO DIFF WBC: CPT | Mod: TEL | Performed by: DERMATOLOGY

## 2020-08-05 PROCEDURE — 80053 COMPREHEN METABOLIC PANEL: CPT | Mod: TEL | Performed by: DERMATOLOGY

## 2020-08-05 PROCEDURE — 36415 COLL VENOUS BLD VENIPUNCTURE: CPT | Mod: TEL | Performed by: DERMATOLOGY

## 2020-08-05 PROCEDURE — 84703 CHORIONIC GONADOTROPIN ASSAY: CPT | Mod: TEL | Performed by: DERMATOLOGY

## 2020-08-05 PROCEDURE — 80061 LIPID PANEL: CPT | Mod: TEL | Performed by: DERMATOLOGY

## 2020-08-05 RX ORDER — ISOTRETINOIN 40 MG/1
80 CAPSULE ORAL DAILY
Qty: 60 CAPSULE | Refills: 0 | Status: SHIPPED | OUTPATIENT
Start: 2020-08-05 | End: 2021-04-16

## 2020-08-05 NOTE — PATIENT INSTRUCTIONS
[facility]- Pediatric Dermatology  Dr. Fatemeh Santillan, Dr. Raymon White, Dr. Johanna Paez, Dr. Minerva Alicea & Dr. Ruy Martinez       Non Urgent  Nurse Triage Line; [phone]- Queenie and Adelaide RN Care Coordinators        If you need a prescription refill, please contact your pharmacy. Refills are approved or denied by our Physicians during normal business hours, Monday through Fridays    Per office policy, refills will not be granted if you have not been seen within the past year (or sooner depending on your child's condition)      Scheduling Information:     Pediatric Appointment Scheduling and Call Center [phone]   Radiology Scheduling- [phone]     Sedation Unit Scheduling- [phone]    [place] Scheduling- [facility] [phone]; Pediatric Dermatology [phone]    Main  Services: [phone]   New Zealander: [phone]   Azerbaijani: [phone]   Hmong/Thai/Maltese: [phone]      Preadmission Nursing Department Fax Number: [phone] (Fax all pre-operative paperwork to this number)      For urgent matters arising during evenings, weekends, or holidays that cannot wait for normal business hours please call [phone] and ask for the Dermatology Resident On-Call to be paged.

## 2020-08-05 NOTE — PROGRESS NOTES
[facility] (Store and Forward ((National Emergency Concerning the CORONAVIRUS (COVID 19), preferred for return patients. )    Image quality and interpretability: acceptable    Physician has received verbal consent for a Video/Photos Visit from the patient? Yes    In-person dermatology visit recommendation: no    Consent has been obtained for this service by 1 care team member: yes.     Teledermatology information:  - Location of patient: Home  - Location of teledermatologist:  ([facility] (Dr. Joseph, [place], [place])  - Reason teledermatology is appropriate:  of National Emergency Regarding Coronavirus disease (COVID 19) Outbreak  - Method of transmission:  Store and Forward ((National Emergency Concerning the CORONAVIRUS (COVID 19), preferred for return patients.   - Date of images: 08/05/20  - Service start time:0958  - Service end time:1008  - Date of report: 08/05/20      ___________________________________________________________________________      Pediatric Dermatology Clinic Note      lAva Joshi  29 year old  [id number]    CC: No chief complaint on file.  f/up    Assessment and Plan:  1. Acne vulgaris:  Acne is inflammatory with post inflammatory pigment changes and scarring. Excellent improvement on isotretinoin for 1 month. Minimal side effects.     -UPT negative x2.  Goal cumulative dose will be 21085-18828 mg.  -Increase isotretinoin to 80 mg daily  -Cumulative dose is 2600 mg        2. Medication monitoring encounter for isotretinoin:  We discussed/reviewed the potential adverse effects including, but not limited to pseudotumor cerebri, dyslipidemia, LFT abnormalities, dry skin, dry eyes, dry mouth, photosensitivity, myalgias, arthralgias and suicidal ideations.  The risk of severe birth defects with pregnancies was also reviewed.  The patient was advised not to give blood or to share his/her medication with others per the iPLEDGE protocol.    Labs today    3.  "Cheilitis: hydrocortisone 2.5% ointment bid as needed. Vaseline frequently to lips.     4. Seborrheic dermatitis of the scalp: ketoconazole shampoo three times per week and mometasone solution nightly as needed.     RTC in 1 months by phone, labs today.     Thank you for involving me in this patient's care.     Johanna Joseph MD  Pediatric Dermatology Staff    CC:       [facility]    ______________________________________________________________________    HPI:   Alva Joshi is a 29 year old female presenting for reevaluation of acne s/p 2 months of isotretinoin. Increased to 80 mg daily last month. Notes worse dry lips. Gets red eyes, but not painful or pruritic. Had a chin wax which caused skin abrasions that are now healing. Few new papules on the chin.        Past treatments: doxycycline, minocycline, spironolactone, OCP, clindamycin lotion.       Past Medical History:   Diagnosis Date     Endometriosis      Gastro-oesophageal reflux disease      Migraines      Numbness and tingling     right shoulder \"Pins and Needles\"     PONV (postoperative nausea and vomiting)        Allergies   Allergen Reactions     Augmentin Shortness Of Breath     Penicillins      Zithromax [Azithromycin] Diarrhea       Current Outpatient Medications   Medication     buPROPion (WELLBUTRIN XL) 300 MG 24 hr tablet     hydrocortisone 2.5 % ointment     ISOtretinoin (ACCUTANE) 40 MG capsule     ketoconazole (NIZORAL) 2 % external shampoo     levonorgestrel (MIRENA) 20 MCG/24HR IUD     metoprolol succinate ER (TOPROL-XL) 50 MG 24 hr tablet     mometasone (ELOCON) 0.1 % external solution     No current facility-administered medications for this visit.        Family History   Problem Relation Age of Onset     Breast Cancer Maternal Grandmother      Diabetes Paternal Grandmother      Breast Cancer Other         great grandmother, 2 maternal great aunts - dx 30's, no genetic testing     Ovarian Cancer Other         " paternal great aunt     Brain Cancer Mother         glioblastoma     Brain Cancer Maternal Uncle        Social Hx:  MA at [employer]    ROS: Negative for fever, weight loss, change in appetite, bone pain/swelling, headaches, vision or hearing problems, cough, rhinorrhea, nausea, vomiting, diarrhea, or mood changes.     PHYSICAL EXAMINATION:     There were no vitals taken for this visit.  GENERAL:  Well appearing and well nourished, in no acute distress.     NEURO: Normal mentation and speech  PSYCH: Normal mood/affect  EYES:  Clear.  Conjunctivae normal.     SKIN-  Light brown macules on the upper cheeks and forehead  Pink papules on the L lower chin  Superficial erosion on the chin

## 2020-08-05 NOTE — PROGRESS NOTES
"Alva who is being evaluated via a billable teledermatology visit.          The patient has been notified of following:         \"We have asked you to send in photos via [employer] or e-mail. These photos will be seen and reviewed by an MD or PANeoC.  A telederm visit is not as thorough as an in-person visit, photo assessment does not replace an in-person skin exam.  The quality of the photograph sent may not be of the same quality as that taken by the dermatology clinic. With that being said, we have found that certain health care needs can be provided without the need for a physical exam.  This service lets us provide the care you need with a short phone conversation. If prescriptions are needed we can send directly to your pharmacy.If lab work is needed we can place an order for that and you can then stop by our lab to have the test done at a later time. An MD/PA/Resident will call you around the time of your visit. This may be from a blocked number.     This is a billable visit. If during the course of the call the physician/provider feels a telephone visit is not appropriate, you will not be charged for this service.            Patient has given verbal consent for Telephone visit?  Yes           The patient would like to proceed with an teledermatology because of the COVID Pandemic.     Patient complains of    Accutane follow up     ALLERGIES REVIEWED?  yes    Pediatric Dermatology- Review of Systems Questions (return patient)       Goal for today's visit? Continuation of treatment      IN THE LAST 2 WEEKS     Fever- no     Mouth/Throat Sores- no/no     Weight Gain/Loss - no/no     Cough/Wheezing- no/no     Change in Appetite- no     Chest Discomfort/Heartburn - no/no     Bone Pain- no     Nausea/Vomiting - no/no     Joint Pain/Swelling - no/no     Constipation/Diarrhea - no/no     Headaches/Dizziness/Change in Vision- no/no/no     Pain with Urination- no     Ear Pain/Hearing Loss- no/no     Nasal " Discharge/Bleeding- no/no     Sadness/Irritability- no/no     Anxiety/Moodiness-no/no       Pediatric Dermatology Clinic - Accutane Questionaire    Dry Lips: Moderate    Dry or Blood Shot Eyes: Mild    Dry Skin: No    Muscle Aches or Pains: No    Nose Bleeds: No    Frequent Headaches: No    Mood Swings: No    Depression: No    Suicidal Thoughts: No    Toenail/Fingernail Inflammation: No    Rash: No    Trouble with Night Vision: No    Severe Sun Sensitivity or Sunburn: No    School or Social problems: No    Change in past medical, family or social history: No    I am aware that I should not share medications or donate blood while taking these medications: Yes    Severity of Acne per scale: Mild    Improvement since the beginning of medication use, on the scale: Fair Improvement (50 to 75%)    Survey completed by:  Patient

## 2020-08-05 NOTE — NURSING NOTE
No chief complaint on file.      There were no vitals taken for this visit.    Mirela MI Olguin  August 5, 2020

## 2020-08-05 NOTE — LETTER
"  8/5/2020      RE: Alva Jsohi  [address] [place] [address]       Alva who is being evaluated via a billable teledermatology visit.          The patient has been notified of following:         \"We have asked you to send in photos via [employer] or e-mail. These photos will be seen and reviewed by an MD or PANeoC.  A telederm visit is not as thorough as an in-person visit, photo assessment does not replace an in-person skin exam.  The quality of the photograph sent may not be of the same quality as that taken by the dermatology clinic. With that being said, we have found that certain health care needs can be provided without the need for a physical exam.  This service lets us provide the care you need with a short phone conversation. If prescriptions are needed we can send directly to your pharmacy.If lab work is needed we can place an order for that and you can then stop by our lab to have the test done at a later time. An MD/PA/Resident will call you around the time of your visit. This may be from a blocked number.     This is a billable visit. If during the course of the call the physician/provider feels a telephone visit is not appropriate, you will not be charged for this service.            Patient has given verbal consent for Telephone visit?  Yes           The patient would like to proceed with an teledermatology because of the COVID Pandemic.     Patient complains of    Accutane follow up     ALLERGIES REVIEWED?  yes    Pediatric Dermatology- Review of Systems Questions (return patient)       Goal for today's visit? Continuation of treatment      IN THE LAST 2 WEEKS     Fever- no     Mouth/Throat Sores- no/no     Weight Gain/Loss - no/no     Cough/Wheezing- no/no     Change in Appetite- no     Chest Discomfort/Heartburn - no/no     Bone Pain- no     Nausea/Vomiting - no/no     Joint Pain/Swelling - no/no     Constipation/Diarrhea - no/no     Headaches/Dizziness/Change in Vision- no/no/no "     Pain with Urination- no     Ear Pain/Hearing Loss- no/no     Nasal Discharge/Bleeding- no/no     Sadness/Irritability- no/no     Anxiety/Moodiness-no/no       Pediatric Dermatology Clinic - Accutane Questionaire    Dry Lips: Moderate    Dry or Blood Shot Eyes: Mild    Dry Skin: No    Muscle Aches or Pains: No    Nose Bleeds: No    Frequent Headaches: No    Mood Swings: No    Depression: No    Suicidal Thoughts: No    Toenail/Fingernail Inflammation: No    Rash: No    Trouble with Night Vision: No    Severe Sun Sensitivity or Sunburn: No    School or Social problems: No    Change in past medical, family or social history: No    I am aware that I should not share medications or donate blood while taking these medications: Yes    Severity of Acne per scale: Mild    Improvement since the beginning of medication use, on the scale: Fair Improvement (50 to 75%)    Survey completed by:  Patient          [facility] (Store and Forward ((National Emergency Concerning the CORONAVIRUS (COVID 19), preferred for return patients. )    Image quality and interpretability: acceptable    Physician has received verbal consent for a Video/Photos Visit from the patient? Yes    In-person dermatology visit recommendation: no    Consent has been obtained for this service by 1 care team member: yes.     Teledermatology information:  - Location of patient: Home  - Location of teledermatologist:  ([facility] (Dr. Joseph, [place], [place])  - Reason teledermatology is appropriate:  of National Emergency Regarding Coronavirus disease (COVID 19) Outbreak  - Method of transmission:  Store and Forward ((National Emergency Concerning the CORONAVIRUS (COVID 19), preferred for return patients.   - Date of images: 08/05/20  - Service start time:0958  - Service end time:1008  - Date of report: 08/05/20      ___________________________________________________________________________      Pediatric Dermatology Clinic  Note      Alva Joshi  29 year old  [id number]    CC: No chief complaint on file.  f/up    Assessment and Plan:  1. Acne vulgaris:  Acne is inflammatory with post inflammatory pigment changes and scarring. Excellent improvement on isotretinoin for 1 month. Minimal side effects.     -UPT negative x2.  Goal cumulative dose will be 52821-90520 mg.  -Increase isotretinoin to 80 mg daily  -Cumulative dose is 2600 mg        2. Medication monitoring encounter for isotretinoin:  We discussed/reviewed the potential adverse effects including, but not limited to pseudotumor cerebri, dyslipidemia, LFT abnormalities, dry skin, dry eyes, dry mouth, photosensitivity, myalgias, arthralgias and suicidal ideations.  The risk of severe birth defects with pregnancies was also reviewed.  The patient was advised not to give blood or to share his/her medication with others per the iPLEDGE protocol.    Labs today    3. Cheilitis: hydrocortisone 2.5% ointment bid as needed. Vaseline frequently to lips.     4. Seborrheic dermatitis of the scalp: ketoconazole shampoo three times per week and mometasone solution nightly as needed.     RTC in 1 months by phone, labs today.     Thank you for involving me in this patient's care.     Johanna Joseph MD  Pediatric Dermatology Staff    CC:       Clinic, [facility]    ______________________________________________________________________    HPI:   Alva Joshi is a 29 year old female presenting for reevaluation of acne s/p 2 months of isotretinoin. Increased to 80 mg daily last month. Notes worse dry lips. Gets red eyes, but not painful or pruritic. Had a chin wax which caused skin abrasions that are now healing. Few new papules on the chin.        Past treatments: doxycycline, minocycline, spironolactone, OCP, clindamycin lotion.       Past Medical History:   Diagnosis Date     Endometriosis      Gastro-oesophageal reflux disease      Migraines      Numbness and tingling     right  "shoulder \"Pins and Needles\"     PONV (postoperative nausea and vomiting)        Allergies   Allergen Reactions     Augmentin Shortness Of Breath     Penicillins      Zithromax [Azithromycin] Diarrhea       Current Outpatient Medications   Medication     buPROPion (WELLBUTRIN XL) 300 MG 24 hr tablet     hydrocortisone 2.5 % ointment     ISOtretinoin (ACCUTANE) 40 MG capsule     ketoconazole (NIZORAL) 2 % external shampoo     levonorgestrel (MIRENA) 20 MCG/24HR IUD     metoprolol succinate ER (TOPROL-XL) 50 MG 24 hr tablet     mometasone (ELOCON) 0.1 % external solution     No current facility-administered medications for this visit.        Family History   Problem Relation Age of Onset     Breast Cancer Maternal Grandmother      Diabetes Paternal Grandmother      Breast Cancer Other         great grandmother, 2 maternal great aunts - dx 30's, no genetic testing     Ovarian Cancer Other         paternal great aunt     Brain Cancer Mother         glioblastoma     Brain Cancer Maternal Uncle        Social Hx:  MA at [employer]    ROS: Negative for fever, weight loss, change in appetite, bone pain/swelling, headaches, vision or hearing problems, cough, rhinorrhea, nausea, vomiting, diarrhea, or mood changes.     PHYSICAL EXAMINATION:     There were no vitals taken for this visit.  GENERAL:  Well appearing and well nourished, in no acute distress.     NEURO: Normal mentation and speech  PSYCH: Normal mood/affect  EYES:  Clear.  Conjunctivae normal.     SKIN-  Light brown macules on the upper cheeks and forehead  Pink papules on the L lower chin  Superficial erosion on the chin        Johanna Joseph MD  "

## 2020-08-28 ENCOUNTER — TELEPHONE (OUTPATIENT)
Dept: DERMATOLOGY | Facility: CLINIC | Age: 30
End: 2020-08-28

## 2020-09-02 ENCOUNTER — VIRTUAL VISIT (OUTPATIENT)
Dept: DERMATOLOGY | Facility: CLINIC | Age: 30
End: 2020-09-02
Attending: DERMATOLOGY
Payer: COMMERCIAL

## 2020-09-02 DIAGNOSIS — L70.0 ACNE VULGARIS: ICD-10-CM

## 2020-09-02 DIAGNOSIS — Z51.81 MEDICATION MONITORING ENCOUNTER: Primary | ICD-10-CM

## 2020-09-02 LAB — HCG UR QL: NEGATIVE

## 2020-09-02 PROCEDURE — 81025 URINE PREGNANCY TEST: CPT | Mod: TEL | Performed by: DERMATOLOGY

## 2020-09-02 PROCEDURE — 40001009 ZZH VIDEO/TELEPHONE VISIT; NO CHARGE

## 2020-09-02 RX ORDER — ISOTRETINOIN 40 MG/1
80 CAPSULE ORAL DAILY
Qty: 60 CAPSULE | Refills: 0 | Status: SHIPPED | OUTPATIENT
Start: 2020-09-02 | End: 2021-04-16

## 2020-09-02 NOTE — LETTER
Date:September 3, 2020      Patient was self referred, no letter generated. Do not send.        [facility] Health Information

## 2020-09-02 NOTE — LETTER
"  9/2/2020      RE: Alva Joshi  [address] [place] [address]       Alva who is being evaluated via a billable teledermatology visit.             The patient has been notified of following:            \"We have asked you to send in photos via [employer] or e-mail. These photos will be seen and reviewed by an MD or PANeoC.  A telederm visit is not as thorough as an in-person visit, photo assessment does not replace an in-person skin exam.  The quality of the photograph sent may not be of the same quality as that taken by the dermatology clinic. With that being said, we have found that certain health care needs can be provided without the need for a physical exam.  This service lets us provide the care you need with a short phone conversation. If prescriptions are needed we can send directly to your pharmacy.If lab work is needed we can place an order for that and you can then stop by our lab to have the test done at a later time. An MD/PA/Resident will call you around the time of your visit. This may be from a blocked number.     This is a billable visit. If during the course of the call the physician/provider feels a telephone visit is not appropriate, you will not be charged for this service.            Patient has given verbal consent for Telephone visit?  Yes           The patient would like to proceed with an teledermatology because of the COVID Pandemic.     Patient complains of    Accutane follow up       ALLERGIES REVIEWED?  Yes    Pediatric Dermatology- Review of Systems Questions (return patient)       Goal for today's visit? Continuation of treatment      IN THE LAST 2 WEEKS     Fever- no     Mouth/Throat Sores- no/no     Weight Gain/Loss - no/no     Cough/Wheezing- no/no     Change in Appetite- no     Chest Discomfort/Heartburn - no/no     Bone Pain- no     Nausea/Vomiting - no/no     Joint Pain/Swelling - no/no     Constipation/Diarrhea - no/no     Headaches/Dizziness/Change in Vision- " no/no/no     Pain with Urination- no     Ear Pain/Hearing Loss- no/no     Nasal Discharge/Bleeding- no/no     Sadness/Irritability- no/no     Anxiety/Moodiness-no/no           Pediatric Dermatology Clinic - Accutane Questionaire    Dry Lips: Mild    Dry or Blood Shot Eyes: No    Dry Skin: Mild    Muscle Aches or Pains: Mild    Nose Bleeds: No    Frequent Headaches: No    Mood Swings: No    Depression: No    Suicidal Thoughts: No    Toenail/Fingernail Inflammation: No    Rash: No    Trouble with Night Vision: No    Severe Sun Sensitivity or Sunburn: No    School or Social problems: No    Change in past medical, family or social history: No    I am aware that I should not share medications or donate blood while taking these medications: Yes    Severity of Acne per scale: Almost Clear    Improvement since the beginning of medication use, on the scale: Moderate Improvement (75 to 90%)    Survey completed by:  Patient          [facility]Store and Forward ((National Emergency Concerning the CORONAVIRUS (COVID 19), preferred for return patients. )    Image quality and interpretability: acceptable    Physician has received verbal consent for a Video/Photos Visit from the patient? Yes    In-person dermatology visit recommendation: no    Consent has been obtained for this service by 1 care team member: yes.     Teledermatology information:  - Location of patient: Home  - Location of teledermatologist:  ([facility]Y (Dr. Joseph, [place], [place])  - Reason teledermatology is appropriate:  of National Emergency Regarding Coronavirus disease (COVID 19) Outbreak  - Method of transmission:  Store and Forward ((National Emergency Concerning the CORONAVIRUS (COVID 19), preferred for return patients.   - Date of images: 09/02/20  - Service start time:0948  - Service end time: 0955  - Date of report: 09/02/20      ___________________________________________________________________________          Pediatric  "Dermatology Clinic Note      Alva Joshi  29 year old  [id number]    CC: Patient presents with:  Teledermatology: Teledermatology with photo review.   f/up    Assessment and Plan:  1. Acne vulgaris:  Acne is inflammatory with post inflammatory pigment changes and scarring. Acne continues to improve. Minimal side effects.     -Goal cumulative dose will be 57660-46367 mg.  -Increase isotretinoin to 80 mg daily  -Cumulative dose is 5000 mg        2. Medication monitoring encounter for isotretinoin:  We discussed/reviewed the potential adverse effects including, but not limited to pseudotumor cerebri, dyslipidemia, LFT abnormalities, dry skin, dry eyes, dry mouth, photosensitivity, myalgias, arthralgias and suicidal ideations.  The risk of severe birth defects with pregnancies was also reviewed.  The patient was advised not to give blood or to share his/her medication with others per the iPLEDGE protocol.    Labs today    3. Cheilitis: hydrocortisone 2.5% ointment bid as needed. Vaseline frequently to lips.     4. Seborrheic dermatitis of the scalp: ketoconazole shampoo three times per week and mometasone solution nightly as needed.     RTC in 1 months by phone    Thank you for involving me in this patient's care.     Johanna Joseph MD  Pediatric Dermatology Staff    CC:       Clinic, [facility]    ______________________________________________________________________    HPI:   Alva Joshi is a 29 year old female presenting for reevaluation of acne on isotretinoin.Continues on 80 mg daily. Notes dripping nasal discharge. No history of seasonal allergies.  Minimal new acne lesions. Lips are dry.        Past treatments: doxycycline, minocycline, spironolactone, OCP, clindamycin lotion.       Past Medical History:   Diagnosis Date     Endometriosis      Gastro-oesophageal reflux disease      Migraines      Numbness and tingling     right shoulder \"Pins and Needles\"     PONV (postoperative nausea and " vomiting)        Allergies   Allergen Reactions     Augmentin Shortness Of Breath     Penicillins      Zithromax [Azithromycin] Diarrhea       Current Outpatient Medications   Medication     buPROPion (WELLBUTRIN XL) 300 MG 24 hr tablet     hydrocortisone 2.5 % ointment     ISOtretinoin (ACCUTANE) 40 MG capsule     ISOtretinoin (ACCUTANE) 40 MG capsule     ketoconazole (NIZORAL) 2 % external shampoo     levonorgestrel (MIRENA) 20 MCG/24HR IUD     metoprolol succinate ER (TOPROL-XL) 50 MG 24 hr tablet     mometasone (ELOCON) 0.1 % external solution     No current facility-administered medications for this visit.        Family History   Problem Relation Age of Onset     Breast Cancer Maternal Grandmother      Diabetes Paternal Grandmother      Breast Cancer Other         great grandmother, 2 maternal great aunts - dx 30's, no genetic testing     Ovarian Cancer Other         paternal great aunt     Brain Cancer Mother         glioblastoma     Brain Cancer Maternal Uncle        Social Hx:  MA at [employer]    ROS: Negative for fever, weight loss, change in appetite, bone pain/swelling, headaches, vision or hearing problems, cough, nausea, vomiting, diarrhea, or mood changes.     PHYSICAL EXAMINATION:     There were no vitals taken for this visit.  GENERAL:  Well appearing and well nourished, in no acute distress.     NEURO: Normal mentation and speech  PSYCH: Normal mood/affect  EYES:  Clear.  Conjunctivae normal.     SKIN-  Lip xerosis.   Pink macules on the chin        Johanna Joseph MD

## 2020-09-02 NOTE — PROGRESS NOTES
"Alva who is being evaluated via a billable teledermatology visit.             The patient has been notified of following:            \"We have asked you to send in photos via [employer] or e-mail. These photos will be seen and reviewed by an MD or PANeoC.  A telederm visit is not as thorough as an in-person visit, photo assessment does not replace an in-person skin exam.  The quality of the photograph sent may not be of the same quality as that taken by the dermatology clinic. With that being said, we have found that certain health care needs can be provided without the need for a physical exam.  This service lets us provide the care you need with a short phone conversation. If prescriptions are needed we can send directly to your pharmacy.If lab work is needed we can place an order for that and you can then stop by our lab to have the test done at a later time. An MD/PA/Resident will call you around the time of your visit. This may be from a blocked number.     This is a billable visit. If during the course of the call the physician/provider feels a telephone visit is not appropriate, you will not be charged for this service.            Patient has given verbal consent for Telephone visit?  Yes           The patient would like to proceed with an teledermatology because of the COVID Pandemic.     Patient complains of    Accutane follow up       ALLERGIES REVIEWED?  Yes    Pediatric Dermatology- Review of Systems Questions (return patient)       Goal for today's visit? Continuation of treatment      IN THE LAST 2 WEEKS     Fever- no     Mouth/Throat Sores- no/no     Weight Gain/Loss - no/no     Cough/Wheezing- no/no     Change in Appetite- no     Chest Discomfort/Heartburn - no/no     Bone Pain- no     Nausea/Vomiting - no/no     Joint Pain/Swelling - no/no     Constipation/Diarrhea - no/no     Headaches/Dizziness/Change in Vision- no/no/no     Pain with Urination- no     Ear Pain/Hearing Loss- no/no     Nasal " Discharge/Bleeding- no/no     Sadness/Irritability- no/no     Anxiety/Moodiness-no/no           Pediatric Dermatology Clinic - Accutane Questionaire    Dry Lips: Mild    Dry or Blood Shot Eyes: No    Dry Skin: Mild    Muscle Aches or Pains: Mild    Nose Bleeds: No    Frequent Headaches: No    Mood Swings: No    Depression: No    Suicidal Thoughts: No    Toenail/Fingernail Inflammation: No    Rash: No    Trouble with Night Vision: No    Severe Sun Sensitivity or Sunburn: No    School or Social problems: No    Change in past medical, family or social history: No    I am aware that I should not share medications or donate blood while taking these medications: Yes    Severity of Acne per scale: Almost Clear    Improvement since the beginning of medication use, on the scale: Moderate Improvement (75 to 90%)    Survey completed by:  Patient

## 2020-09-02 NOTE — PROGRESS NOTES
[facility] (Store and Forward ((National Emergency Concerning the CORONAVIRUS (COVID 19), preferred for return patients. )    Image quality and interpretability: acceptable    Physician has received verbal consent for a Video/Photos Visit from the patient? Yes    In-person dermatology visit recommendation: no    Consent has been obtained for this service by 1 care team member: yes.     Teledermatology information:  - Location of patient: Home  - Location of teledermatologist:  (PEDS DERMATOLOGY (Dr. Joseph, [place], [place])  - Reason teledermatology is appropriate:  of National Emergency Regarding Coronavirus disease (COVID 19) Outbreak  - Method of transmission:  Store and Forward ((National Emergency Concerning the CORONAVIRUS (COVID 19), preferred for return patients.   - Date of images: 09/02/20  - Service start time:0948  - Service end time: 0955  - Date of report: 09/02/20      ___________________________________________________________________________          Pediatric Dermatology Clinic Note      Alva Joshi  29 year old  [id number]    CC: Patient presents with:  Teledermatology: Teledermatology with photo review.   f/up    Assessment and Plan:  1. Acne vulgaris:  Acne is inflammatory with post inflammatory pigment changes and scarring. Acne continues to improve. Minimal side effects.     -Goal cumulative dose will be 51465-65608 mg.  -Increase isotretinoin to 80 mg daily  -Cumulative dose is 5000 mg        2. Medication monitoring encounter for isotretinoin:  We discussed/reviewed the potential adverse effects including, but not limited to pseudotumor cerebri, dyslipidemia, LFT abnormalities, dry skin, dry eyes, dry mouth, photosensitivity, myalgias, arthralgias and suicidal ideations.  The risk of severe birth defects with pregnancies was also reviewed.  The patient was advised not to give blood or to share his/her medication with others per the iPLEDGE protocol.    Labs today    3.  "Cheilitis: hydrocortisone 2.5% ointment bid as needed. Vaseline frequently to lips.     4. Seborrheic dermatitis of the scalp: ketoconazole shampoo three times per week and mometasone solution nightly as needed.     RTC in 1 months by phone    Thank you for involving me in this patient's care.     Johanna Joseph MD  Pediatric Dermatology Staff    CC:       [facility]    ______________________________________________________________________    HPI:   Alva Joshi is a 29 year old female presenting for reevaluation of acne on isotretinoin.Continues on 80 mg daily. Notes dripping nasal discharge. No history of seasonal allergies.  Minimal new acne lesions. Lips are dry.        Past treatments: doxycycline, minocycline, spironolactone, OCP, clindamycin lotion.       Past Medical History:   Diagnosis Date     Endometriosis      Gastro-oesophageal reflux disease      Migraines      Numbness and tingling     right shoulder \"Pins and Needles\"     PONV (postoperative nausea and vomiting)        Allergies   Allergen Reactions     Augmentin Shortness Of Breath     Penicillins      Zithromax [Azithromycin] Diarrhea       Current Outpatient Medications   Medication     buPROPion (WELLBUTRIN XL) 300 MG 24 hr tablet     hydrocortisone 2.5 % ointment     ISOtretinoin (ACCUTANE) 40 MG capsule     ISOtretinoin (ACCUTANE) 40 MG capsule     ketoconazole (NIZORAL) 2 % external shampoo     levonorgestrel (MIRENA) 20 MCG/24HR IUD     metoprolol succinate ER (TOPROL-XL) 50 MG 24 hr tablet     mometasone (ELOCON) 0.1 % external solution     No current facility-administered medications for this visit.        Family History   Problem Relation Age of Onset     Breast Cancer Maternal Grandmother      Diabetes Paternal Grandmother      Breast Cancer Other         great grandmother, 2 maternal great aunts - dx 30's, no genetic testing     Ovarian Cancer Other         paternal great aunt     Brain Cancer Mother         " glioblastoma     Brain Cancer Maternal Uncle        Social Hx:  MA at [employer]    ROS: Negative for fever, weight loss, change in appetite, bone pain/swelling, headaches, vision or hearing problems, cough, nausea, vomiting, diarrhea, or mood changes.     PHYSICAL EXAMINATION:     There were no vitals taken for this visit.  GENERAL:  Well appearing and well nourished, in no acute distress.     NEURO: Normal mentation and speech  PSYCH: Normal mood/affect  EYES:  Clear.  Conjunctivae normal.     SKIN-  Lip xerosis.   Pink macules on the chin

## 2020-09-02 NOTE — NURSING NOTE
Chief Complaint   Patient presents with     Teledermatology     Teledermatology with photo review.        There were no vitals taken for this visit.    Mirela Olguin CMA  September 2, 2020

## 2020-09-02 NOTE — PATIENT INSTRUCTIONS
[facility]- Pediatric Dermatology  Dr. Fatemeh Santillan, Dr. Raymon White, Dr. Johanna Paez, Dr. Minerva Alicea & Dr. Ruy Martinez       Non Urgent  Nurse Triage Line; [phone]- Queenie and Adelaide RN Care Coordinators        If you need a prescription refill, please contact your pharmacy. Refills are approved or denied by our Physicians during normal business hours, Monday through Fridays    Per office policy, refills will not be granted if you have not been seen within the past year (or sooner depending on your child's condition)      Scheduling Information:     Pediatric Appointment Scheduling and Call Center [phone]   Radiology Scheduling- [phone]     Sedation Unit Scheduling- [phone]    [facility] Scheduling- [facility] [phone]; Pediatric Dermatology [phone]    Main  Services: [phone]   Sierra Leonean: [phone]   Tanzanian: [phone]   Hmong/Pashto/Italian: [phone]      Preadmission Nursing Department Fax Number: [phone] (Fax all pre-operative paperwork to this number)      For urgent matters arising during evenings, weekends, or holidays that cannot wait for normal business hours please call [phone] and ask for the Dermatology Resident On-Call to be paged.

## 2020-10-06 ENCOUNTER — VIRTUAL VISIT (OUTPATIENT)
Dept: DERMATOLOGY | Facility: CLINIC | Age: 30
End: 2020-10-06
Payer: COMMERCIAL

## 2020-10-06 DIAGNOSIS — Z51.81 MEDICATION MONITORING ENCOUNTER: Primary | ICD-10-CM

## 2020-10-06 DIAGNOSIS — L70.0 ACNE VULGARIS: ICD-10-CM

## 2020-10-06 LAB — HCG SERPL QL: NEGATIVE

## 2020-10-06 PROCEDURE — 99213 OFFICE O/P EST LOW 20 MIN: CPT | Mod: TEL | Performed by: DERMATOLOGY

## 2020-10-06 PROCEDURE — 36415 COLL VENOUS BLD VENIPUNCTURE: CPT | Performed by: DERMATOLOGY

## 2020-10-06 PROCEDURE — 84703 CHORIONIC GONADOTROPIN ASSAY: CPT | Performed by: DERMATOLOGY

## 2020-10-06 RX ORDER — ISOTRETINOIN 40 MG/1
80 CAPSULE ORAL DAILY
Qty: 60 CAPSULE | Refills: 0 | Status: SHIPPED | OUTPATIENT
Start: 2020-10-06 | End: 2021-04-16

## 2020-10-06 NOTE — LETTER
"  10/6/2020      RE: Alva Joshi  [address] [place] [address]       Alva is being evaluated via a billable teledermatology visit.             The patient has been notified of following:            \"We have asked you to send in photos via [employer] or e-mail. These photos will be seen and reviewed by an MD or PA-C.  A telederm visit is not as thorough as an in-person visit, photo assessment does not replace an in-person skin exam.  The quality of the photograph sent may not be of the same quality as that taken by the dermatology clinic. With that being said, we have found that certain health care needs can be provided without the need for a physical exam.  This service lets us provide the care you need with a short phone conversation. If prescriptions are needed we can send directly to your pharmacy.If lab work is needed we can place an order for that and you can then stop by our lab to have the test done at a later time. An MD/PA/Resident will call you around the time of your visit. This may be from a blocked number.     This is a billable visit. If during the course of the call the physician/provider feels a telephone visit is not appropriate, you will not be charged for this service.            Patient has given verbal consent for Telephone visit?  Yes           The patient would like to proceed with an teledermatology because of the COVID Pandemic.     Patient complains of    Recheck accutane       ALLERGIES REVIEWED?  Yes    [facility] (Store and Forward ((National Emergency Concerning the CORONAVIRUS (COVID 19), preferred for return patients. )    Image quality and interpretability: acceptable    Physician has received verbal consent for a Video/Photos Visit from the patient? Yes    In-person dermatology visit recommendation: no    Consent has been obtained for this service by 1 care team member: yes.     Teledermatology information:  - Location of patient: Home  - Location " of teledermatologist:  [facility] (Dr. Joseph, [place], [place])  - Reason teledermatology is appropriate:  of National Emergency Regarding Coronavirus disease (COVID 19) Outbreak  - Method of transmission:  Store and Forward ((National Emergency Concerning the CORONAVIRUS (COVID 19), preferred for return patients.   - Date of images: 10/06/20  - Service start time: 1249  - Service end [id number]  - Date of report: 10/06/20      ___________________________________________________________________________      Pediatric Dermatology Clinic Note      Alva Joshi  29 year old  [id number]    CC: Patient presents with:  teledermatology: phone visit with photo review  f/up    Assessment and Plan:  1. Acne vulgaris:  Acne is inflammatory with post inflammatory pigment changes and scarring. Acne continues to improve. Minimal side effects.   -Goal cumulative dose will be 06939-91428 mg.  -Continue isotretinoin at 80 mg daily  -Cumulative dose is 7400 mg        2. Medication monitoring encounter for isotretinoin:  We discussed/reviewed the potential adverse effects including, but not limited to pseudotumor cerebri, dyslipidemia, LFT abnormalities, dry skin, dry eyes, dry mouth, photosensitivity, myalgias, arthralgias and suicidal ideations.  The risk of severe birth defects with pregnancies was also reviewed.  The patient was advised not to give blood or to share his/her medication with others per the iPLEDGE protocol.    Labs normal last on 8/5/20    3. Cheilitis: hydrocortisone 2.5% ointment bid as needed. Vaseline frequently to lips.     4. Seborrheic dermatitis of the scalp: ketoconazole shampoo three times per week and mometasone solution nightly as needed.     RTC in 1 months by phone    Thank you for involving me in this patient's care.     Johanna Joseph MD  Pediatric Dermatology Staff    CC:       [facility]  "[place]    ______________________________________________________________________    HPI:   Alva Joshi is a 29 year old female presenting for reevaluation of acne on isotretinoin.Continues on 80 mg daily. Notes dripping nasal discharge has improved. Notes skin redness on the face. Lips are dry.        Past treatments: doxycycline, minocycline, spironolactone, OCP, clindamycin lotion.       Past Medical History:   Diagnosis Date     Endometriosis      Gastro-oesophageal reflux disease      Migraines      Numbness and tingling     right shoulder \"Pins and Needles\"     PONV (postoperative nausea and vomiting)        Allergies   Allergen Reactions     Augmentin Shortness Of Breath     Penicillins      Zithromax [Azithromycin] Diarrhea       Current Outpatient Medications   Medication     buPROPion (WELLBUTRIN XL) 300 MG 24 hr tablet     hydrocortisone 2.5 % ointment     ISOtretinoin (ACCUTANE) 40 MG capsule     ISOtretinoin (ACCUTANE) 40 MG capsule     ISOtretinoin (ACCUTANE) 40 MG capsule     ketoconazole (NIZORAL) 2 % external shampoo     levonorgestrel (MIRENA) 20 MCG/24HR IUD     metoprolol succinate ER (TOPROL-XL) 50 MG 24 hr tablet     mometasone (ELOCON) 0.1 % external solution     No current facility-administered medications for this visit.        Family History   Problem Relation Age of Onset     Breast Cancer Maternal Grandmother      Diabetes Paternal Grandmother      Breast Cancer Other         great grandmother, 2 maternal great aunts - dx 30's, no genetic testing     Ovarian Cancer Other         paternal great aunt     Brain Cancer Mother         glioblastoma     Brain Cancer Maternal Uncle        Social Hx:  MA at [employer]    ROS: Negative for fever, weight loss, change in appetite, bone pain/swelling, headaches, vision or hearing problems, cough, nausea, vomiting, diarrhea, or mood changes.     PHYSICAL EXAMINATION:     There were no vitals taken for this visit.  GENERAL:  Well appearing and well " nourished, in no acute distress.     NEURO: Normal mentation and speech  PSYCH: Normal mood/affect  EYES:  Clear.  Conjunctivae normal.     SKIN-  Lip xerosis.   Pink papule on the L zygoma          Johanna oJseph MD

## 2020-10-06 NOTE — PROGRESS NOTES
[facility] ((National Emergency Concerning the CORONAVIRUS (COVID 19), preferred for return patients. )    Image quality and interpretability: acceptable    Physician has received verbal consent for a Video/Photos Visit from the patient? Yes    In-person dermatology visit recommendation: no    Consent has been obtained for this service by 1 care team member: yes.     Teledermatology information:  - Location of patient: Home  - Location of teledermatologist:  ([facility] (Dr. Joseph, [place], [place])  - Reason teledermatology is appropriate:  of National Emergency Regarding Coronavirus disease (COVID 19) Outbreak  - Method of transmission:  Store and Forward ((National Emergency Concerning the CORONAVIRUS (COVID 19), preferred for return patients.   - Date of images: 10/06/20  - Service start time: 1249  - Service end [id number]  - Date of report: 10/06/20      ___________________________________________________________________________      Pediatric Dermatology Clinic Note      Alva Joshi  29 year old  [id number]    CC: Patient presents with:  teledermatology: phone visit with photo review  f/up    Assessment and Plan:  1. Acne vulgaris:  Acne is inflammatory with post inflammatory pigment changes and scarring. Acne continues to improve. Minimal side effects.   -Goal cumulative dose will be 19675-92735 mg.  -Continue isotretinoin at 80 mg daily  -Cumulative dose is 7400 mg        2. Medication monitoring encounter for isotretinoin:  We discussed/reviewed the potential adverse effects including, but not limited to pseudotumor cerebri, dyslipidemia, LFT abnormalities, dry skin, dry eyes, dry mouth, photosensitivity, myalgias, arthralgias and suicidal ideations.  The risk of severe birth defects with pregnancies was also reviewed.  The patient was advised not to give blood or to share his/her medication with others per the iPLEDGE protocol.    Labs normal last on  "8/5/20    3. Cheilitis: hydrocortisone 2.5% ointment bid as needed. Vaseline frequently to lips.     4. Seborrheic dermatitis of the scalp: ketoconazole shampoo three times per week and mometasone solution nightly as needed.     RTC in 1 months by phone    Thank you for involving me in this patient's care.     Johanna Joseph MD  Pediatric Dermatology Staff    CC:       [facility]    ______________________________________________________________________    HPI:   Alva Joshi is a 29 year old female presenting for reevaluation of acne on isotretinoin.Continues on 80 mg daily. Notes dripping nasal discharge has improved. Notes skin redness on the face. Lips are dry.        Past treatments: doxycycline, minocycline, spironolactone, OCP, clindamycin lotion.       Past Medical History:   Diagnosis Date     Endometriosis      Gastro-oesophageal reflux disease      Migraines      Numbness and tingling     right shoulder \"Pins and Needles\"     PONV (postoperative nausea and vomiting)        Allergies   Allergen Reactions     Augmentin Shortness Of Breath     Penicillins      Zithromax [Azithromycin] Diarrhea       Current Outpatient Medications   Medication     buPROPion (WELLBUTRIN XL) 300 MG 24 hr tablet     hydrocortisone 2.5 % ointment     ISOtretinoin (ACCUTANE) 40 MG capsule     ISOtretinoin (ACCUTANE) 40 MG capsule     ISOtretinoin (ACCUTANE) 40 MG capsule     ketoconazole (NIZORAL) 2 % external shampoo     levonorgestrel (MIRENA) 20 MCG/24HR IUD     metoprolol succinate ER (TOPROL-XL) 50 MG 24 hr tablet     mometasone (ELOCON) 0.1 % external solution     No current facility-administered medications for this visit.        Family History   Problem Relation Age of Onset     Breast Cancer Maternal Grandmother      Diabetes Paternal Grandmother      Breast Cancer Other         great grandmother, 2 maternal great aunts - dx 30's, no genetic testing     Ovarian Cancer Other         paternal great " aunt     Brain Cancer Mother         glioblastoma     Brain Cancer Maternal Uncle        Social Hx:  MA at [employer]    ROS: Negative for fever, weight loss, change in appetite, bone pain/swelling, headaches, vision or hearing problems, cough, nausea, vomiting, diarrhea, or mood changes.     PHYSICAL EXAMINATION:     There were no vitals taken for this visit.  GENERAL:  Well appearing and well nourished, in no acute distress.     NEURO: Normal mentation and speech  PSYCH: Normal mood/affect  EYES:  Clear.  Conjunctivae normal.     SKIN-  Lip xerosis.   Pink papule on the L zygoma

## 2020-10-06 NOTE — PROGRESS NOTES
"Alva is being evaluated via a billable teledermatology visit.             The patient has been notified of following:            \"We have asked you to send in photos via [employer] or e-mail. These photos will be seen and reviewed by an MD or PA-C.  A telederm visit is not as thorough as an in-person visit, photo assessment does not replace an in-person skin exam.  The quality of the photograph sent may not be of the same quality as that taken by the dermatology clinic. With that being said, we have found that certain health care needs can be provided without the need for a physical exam.  This service lets us provide the care you need with a short phone conversation. If prescriptions are needed we can send directly to your pharmacy.If lab work is needed we can place an order for that and you can then stop by our lab to have the test done at a later time. An MD/PA/Resident will call you around the time of your visit. This may be from a blocked number.     This is a billable visit. If during the course of the call the physician/provider feels a telephone visit is not appropriate, you will not be charged for this service.            Patient has given verbal consent for Telephone visit?  Yes           The patient would like to proceed with an teledermatology because of the COVID Pandemic.     Patient complains of    Recheck accutane       ALLERGIES REVIEWED?  Yes  "

## 2020-10-06 NOTE — LETTER
Date:October 7, 2020      Patient was self referred, no letter generated. Do not send.        [facility] Health Information

## 2020-10-23 DIAGNOSIS — R00.2 PALPITATIONS: ICD-10-CM

## 2020-10-27 ENCOUNTER — MYC REFILL (OUTPATIENT)
Dept: CARDIOLOGY | Facility: CLINIC | Age: 30
End: 2020-10-27

## 2020-10-27 DIAGNOSIS — R00.2 PALPITATIONS: ICD-10-CM

## 2020-10-27 RX ORDER — METOPROLOL SUCCINATE 50 MG/1
50 TABLET, EXTENDED RELEASE ORAL DAILY
Qty: 30 TABLET | Refills: 3 | Status: CANCELLED | OUTPATIENT
Start: 2020-10-27

## 2020-10-27 NOTE — TELEPHONE ENCOUNTER
METOPROLOL SUCC ER 50 MG TAB      Last Written Prescription Date:  6/23/20  Last Fill Quantity: 30,   # refills: 3  Last Virtual Visit : Rosalinda Thomas MD  Cardiology  6/23/2020  [facility]  Recommended check in 2 months  Future Office visit:  None scheduled    Routing refill request to provider for review/approval because:  Blood pressure out of range   BP Readings from Last 3 Encounters:   06/15/20 (!) 142/97   05/14/20 (!) 151/91   03/10/20 122/70   Elevated BP's both at ED visits  Last dictation note:  Assessment and Plan:   Palpitations. EKG in ED with symptoms showed sinus tachycardia. I do not have the EKG that she took at work which showed a heart rate of 170 bpm - she will send this to me via [employer]. Will review EKG and ziopatch monitor when available. For now, she is feeling well, tolerating metoprolol succinate 50 every day. I have renewed her prescription for 3 months. Will check in with her in 2 months to see if she has any recurrences, and consider possibly stopping metoprolol and/or EPS if she has anything other than sinus tachycardia.    Rx pended for #30 no refills, uncertain if will continue?

## 2020-10-28 RX ORDER — METOPROLOL SUCCINATE 50 MG/1
50 TABLET, EXTENDED RELEASE ORAL DAILY
Qty: 90 TABLET | Refills: 0 | Status: SHIPPED | OUTPATIENT
Start: 2020-10-28 | End: 2021-02-04

## 2020-10-28 NOTE — TELEPHONE ENCOUNTER
Further refills to come from PCP.    Linda Smith, BSN, RN, PHN  Electrophysiology Nurse Coordinator

## 2020-11-04 ENCOUNTER — VIRTUAL VISIT (OUTPATIENT)
Dept: DERMATOLOGY | Facility: CLINIC | Age: 30
End: 2020-11-04
Attending: DERMATOLOGY
Payer: COMMERCIAL

## 2020-11-04 ENCOUNTER — APPOINTMENT (OUTPATIENT)
Dept: LAB | Facility: CLINIC | Age: 30
End: 2020-11-04
Attending: DERMATOLOGY
Payer: COMMERCIAL

## 2020-11-04 DIAGNOSIS — L01.00 IMPETIGO: ICD-10-CM

## 2020-11-04 DIAGNOSIS — L70.0 ACNE VULGARIS: ICD-10-CM

## 2020-11-04 DIAGNOSIS — L81.9 POST-INFLAMMATORY PIGMENTARY CHANGES: Primary | ICD-10-CM

## 2020-11-04 LAB — HCG SERPL QL: NEGATIVE

## 2020-11-04 PROCEDURE — 40001009 ZZH VIDEO/TELEPHONE VISIT; NO CHARGE

## 2020-11-04 PROCEDURE — 99213 OFFICE O/P EST LOW 20 MIN: CPT | Mod: GQ | Performed by: DERMATOLOGY

## 2020-11-04 PROCEDURE — 36415 COLL VENOUS BLD VENIPUNCTURE: CPT | Mod: TEL | Performed by: DERMATOLOGY

## 2020-11-04 PROCEDURE — 84703 CHORIONIC GONADOTROPIN ASSAY: CPT | Mod: TEL | Performed by: DERMATOLOGY

## 2020-11-04 RX ORDER — ISOTRETINOIN 40 MG/1
80 CAPSULE ORAL DAILY
Qty: 60 CAPSULE | Refills: 0 | Status: SHIPPED | OUTPATIENT
Start: 2020-11-04 | End: 2020-11-06

## 2020-11-04 NOTE — PROGRESS NOTES
"Alva who is being evaluated via a billable teledermatology visit.             The patient has been notified of following:            \"We have asked you to send in photos via [employer] or e-mail. These photos will be seen and reviewed by an MD or PANeoC.  A telederm visit is not as thorough as an in-person visit, photo assessment does not replace an in-person skin exam.  The quality of the photograph sent may not be of the same quality as that taken by the dermatology clinic. With that being said, we have found that certain health care needs can be provided without the need for a physical exam.  This service lets us provide the care you need with a short phone conversation. If prescriptions are needed we can send directly to your pharmacy.If lab work is needed we can place an order for that and you can then stop by our lab to have the test done at a later time. An MD/PA/Resident will call you around the time of your visit. This may be from a blocked number.     This is a billable visit. If during the course of the call the physician/provider feels a telephone visit is not appropriate, you will not be charged for this service.            Patient has given verbal consent for Telephone visit?  Yes           The patient would like to proceed with an teledermatology because of the COVID Pandemic.     Patient complains of    Derm recheck       ALLERGIES REVIEWED?  y    Pediatric Dermatology- Review of Systems Questions (return patient)          Goal for today's visit? Check up on skin     IN THE LAST 2 WEEKS     Fever- n     Mouth/Throat Sores- n/n     Weight Gain/Loss - n/n     Cough/Wheezing- n/n     Change in Appetite- n     Chest Discomfort/Heartburn - n/n     Bone Pain- n     Nausea/Vomiting - n/n     Joint Pain/Swelling - n/n     Constipation/Diarrhea - n/n     Headaches/Dizziness/Change in Vision- n/n/n     Pain with Urination- n     Ear Pain/Hearing Loss- n/n     Nasal Discharge/Bleeding- n/n "     Sadness/Irritability- n/n     Anxiety/Moodiness-n/n

## 2020-11-04 NOTE — PROGRESS NOTES
[facility] ([facility] Forward ((National Emergency Concerning the CORONAVIRUS (COVID 19), preferred for return patients. )    Image quality and interpretability: acceptable    Physician has received verbal consent for a Video/Photos Visit from the patient? Yes    In-person dermatology visit recommendation: no    Consent has been obtained for this service by 1 care team member: yes.     Teledermatology information:  - Location of patient: Home  - Location of teledermatologist:  ([facility] (Dr. Joseph, [place], [place])  - Reason teledermatology is appropriate:  of National Emergency Regarding Coronavirus disease (COVID 19) Outbreak  - Method of transmission:  Store and Forward ((National Emergency Concerning the CORONAVIRUS (COVID 19), preferred for return patients.   - Date of images: 11/04/20  - Service start time: 0906  - Service end time: 0911   - Date of report: 11/04/20      ___________________________________________________________________________        Pediatric Dermatology Clinic Note      Alva Joshi  29 year old  [id number]    CC: Patient presents with:  teledermatology: teledermatology w/ photo review  f/up    Assessment and Plan:  1. Acne vulgaris:  Acne is inflammatory with post inflammatory pigment changes and scarring. Acne continues to improve. Minimal side effects.   -Goal cumulative dose will be 87673-78562 mg.  -Continue isotretinoin at 80 mg daily  -Cumulative dose is 9800 mg        2. Medication monitoring encounter for isotretinoin:  We discussed/reviewed the potential adverse effects including, but not limited to pseudotumor cerebri, dyslipidemia, LFT abnormalities, dry skin, dry eyes, dry mouth, photosensitivity, myalgias, arthralgias and suicidal ideations.  The risk of severe birth defects with pregnancies was also reviewed.  The patient was advised not to give blood or to share his/her medication with others per the iPLEDGE  "protocol.    Labs normal last on 8/5/20    3. Cheilitis: hydrocortisone 2.5% ointment bid as needed. Vaseline frequently to lips.     4. Seborrheic dermatitis of the scalp: ketoconazole shampoo three times per week and mometasone solution nightly as needed. No issues.     RTC in 1 months by phone    Thank you for involving me in this patient's care.     Johanna Joseph MD  Pediatric Dermatology Staff    CC:       [facility], [facility]    ______________________________________________________________________    HPI:   Alva Joshi is a 29 year old female presenting for reevaluation of acne on isotretinoin.Continues on 80 mg daily. Notes ongoing redness of the face. Nose dripping  Lips are dry. She notes that she is planning hair dye and wonder if this is OK.        Past treatments: doxycycline, minocycline, spironolactone, OCP, clindamycin lotion.       Past Medical History:   Diagnosis Date     Endometriosis      Gastro-oesophageal reflux disease      Migraines      Numbness and tingling     right shoulder \"Pins and Needles\"     PONV (postoperative nausea and vomiting)        Allergies   Allergen Reactions     Augmentin Shortness Of Breath     Penicillins      Zithromax [Azithromycin] Diarrhea       Current Outpatient Medications   Medication     buPROPion (WELLBUTRIN XL) 300 MG 24 hr tablet     hydrocortisone 2.5 % ointment     ISOtretinoin (ACCUTANE) 40 MG capsule     ISOtretinoin (ACCUTANE) 40 MG capsule     ISOtretinoin (ACCUTANE) 40 MG capsule     ISOtretinoin (ACCUTANE) 40 MG capsule     ketoconazole (NIZORAL) 2 % external shampoo     levonorgestrel (MIRENA) 20 MCG/24HR IUD     metoprolol succinate ER (TOPROL-XL) 50 MG 24 hr tablet     mometasone (ELOCON) 0.1 % external solution     No current facility-administered medications for this visit.        Family History   Problem Relation Age of Onset     Breast Cancer Maternal Grandmother      Diabetes Paternal Grandmother      Breast Cancer Other        "  great grandmother, 2 maternal great aunts - dx 30's, no genetic testing     Ovarian Cancer Other         paternal great aunt     Brain Cancer Mother         glioblastoma     Brain Cancer Maternal Uncle        Social Hx:  MA at [employer]    ROS: Negative for fever, weight loss, change in appetite, bone pain/swelling, headaches, vision or hearing problems, cough, nausea, vomiting, diarrhea, or mood changes.     PHYSICAL EXAMINATION:     There were no vitals taken for this visit.  GENERAL:  Well appearing and well nourished, in no acute distress.     NEURO: Normal mentation and speech  PSYCH: Normal mood/affect  EYES:  Clear.  Conjunctivae normal.     SKIN-  Lip xerosis with fissuring  No acneiform lesions.

## 2020-11-04 NOTE — PATIENT INSTRUCTIONS
[facility]- Pediatric Dermatology  Dr. Fatemeh Santillan, Dr. Raymon White, Dr. Johanna Joseph, Janelle Paez, MARIEL Paez, Dr. Minerva Alicea & Dr. Ruy Martinez       Non Urgent  Nurse Triage Line; [phone]- Queenie and Adelaide COLLINS Care Coordinators      Gricelda (/Complex ) [phone]      If you need a prescription refill, please contact your pharmacy. Refills are approved or denied by our Physicians during normal business hours, Monday through Fridays    Per office policy, refills will not be granted if you have not been seen within the past year (or sooner depending on your child's condition)      Scheduling Information:     Pediatric Appointment Scheduling and Call Center [phone]   Radiology Scheduling- [phone]     Sedation Unit Scheduling- [phone]    [place] Scheduling- [facility] [phone]; Pediatric Dermatology [phone]    Main  Services: [phone]   Hebrew: [phone]   Anguillan: [phone]   Hmong/Kyrgyz/Syriac: [phone]      Preadmission Nursing Department Fax Number: [phone] (Fax all pre-operative paperwork to this number)      For urgent matters arising during evenings, weekends, or holidays that cannot wait for normal business hours please call [phone] and ask for the Dermatology Resident On-Call to be paged.

## 2020-11-06 RX ORDER — ISOTRETINOIN 40 MG/1
80 CAPSULE ORAL DAILY
Qty: 60 CAPSULE | Refills: 0 | Status: SHIPPED | OUTPATIENT
Start: 2020-11-06 | End: 2021-04-16

## 2020-11-09 RX ORDER — MUPIROCIN 20 MG/G
OINTMENT TOPICAL
Qty: 22 G | Refills: 0 | Status: SHIPPED | OUTPATIENT
Start: 2020-11-09 | End: 2021-04-16

## 2020-12-02 ENCOUNTER — OFFICE VISIT (OUTPATIENT)
Dept: DERMATOLOGY | Facility: CLINIC | Age: 30
End: 2020-12-02
Attending: DERMATOLOGY
Payer: COMMERCIAL

## 2020-12-02 DIAGNOSIS — Z51.81 MEDICATION MONITORING ENCOUNTER: Primary | ICD-10-CM

## 2020-12-02 DIAGNOSIS — L70.0 ACNE VULGARIS: ICD-10-CM

## 2020-12-02 PROCEDURE — 99214 OFFICE O/P EST MOD 30 MIN: CPT | Performed by: DERMATOLOGY

## 2020-12-02 PROCEDURE — G0463 HOSPITAL OUTPT CLINIC VISIT: HCPCS

## 2020-12-02 PROCEDURE — 84703 CHORIONIC GONADOTROPIN ASSAY: CPT | Performed by: DERMATOLOGY

## 2020-12-02 PROCEDURE — 36415 COLL VENOUS BLD VENIPUNCTURE: CPT | Performed by: DERMATOLOGY

## 2020-12-02 RX ORDER — ISOTRETINOIN 40 MG/1
80 CAPSULE ORAL DAILY
Qty: 60 CAPSULE | Refills: 0 | Status: SHIPPED | OUTPATIENT
Start: 2020-12-02 | End: 2021-04-16

## 2020-12-02 NOTE — LETTER
12/2/2020      RE: Alva Joshi  [address] [address]           Pediatric Dermatology Clinic Note      Alva Joshi  29 year old  [id number]    CC: Patient presents with:  RECHECK: follow up visit for accutane  f/up    Assessment and Plan:  1. Acne vulgaris:  Acne is inflammatory with post inflammatory pigment changes and scarring. Acne continues to improve. Minimal side effects.   -Goal cumulative dose will be 46566-43941 mg.  -Continue isotretinoin at 80 mg daily  -Cumulative dose is 40585 mg        2. Medication monitoring encounter for isotretinoin:  We discussed/reviewed the potential adverse effects including, but not limited to pseudotumor cerebri, dyslipidemia, LFT abnormalities, dry skin, dry eyes, dry mouth, photosensitivity, myalgias, arthralgias and suicidal ideations.  The risk of severe birth defects with pregnancies was also reviewed.  The patient was advised not to give blood or to share his/her medication with others per the iPLEDGE protocol.    Labs normal last on 8/5/20    3. Cheilitis: hydrocortisone 2.5% ointment bid as needed. Vaseline frequently to lips.     4. Seborrheic dermatitis of the scalp: ketoconazole shampoo three times per week and mometasone solution nightly as needed. No issues.     5. Hair loss- noted that this is related to the isotretinoin and will improve after the course is complete. We could also decrease to a lower dose, but this would prolong the treatment course.     RTC in 1 months by phone    Thank you for involving me in this patient's care.     Johanna Joseph MD  Pediatric Dermatology Staff    CC:       [facility], [place]    ______________________________________________________________________    HPI:   Alva Joshi is a 29 year old female presenting for reevaluation of acne on isotretinoin.Continues on 80 mg daily. Notes ongoing lip dryness. Acne is improved with no new lesions. Having some hair thinning in the last month  "with diffuse shedding. No scalp itch or pain.         Past treatments: doxycycline, minocycline, spironolactone, OCP, clindamycin lotion.       Past Medical History:   Diagnosis Date     Endometriosis      Gastro-oesophageal reflux disease      Migraines      Numbness and tingling     right shoulder \"Pins and Needles\"     PONV (postoperative nausea and vomiting)        Allergies   Allergen Reactions     Augmentin Shortness Of Breath     Penicillins      Zithromax [Azithromycin] Diarrhea       Current Outpatient Medications   Medication     buPROPion (WELLBUTRIN XL) 300 MG 24 hr tablet     hydrocortisone 2.5 % ointment     ISOtretinoin (ACCUTANE) 40 MG capsule     ISOtretinoin (ACCUTANE) 40 MG capsule     ISOtretinoin (ACCUTANE) 40 MG capsule     ISOtretinoin (ACCUTANE) 40 MG capsule     ISOtretinoin (ACCUTANE) 40 MG capsule     ketoconazole (NIZORAL) 2 % external shampoo     levonorgestrel (MIRENA) 20 MCG/24HR IUD     metoprolol succinate ER (TOPROL-XL) 50 MG 24 hr tablet     mometasone (ELOCON) 0.1 % external solution     mupirocin (BACTROBAN) 2 % external ointment     No current facility-administered medications for this visit.        Family History   Problem Relation Age of Onset     Breast Cancer Maternal Grandmother      Diabetes Paternal Grandmother      Breast Cancer Other         great grandmother, 2 maternal great aunts - dx 30's, no genetic testing     Ovarian Cancer Other         paternal great aunt     Brain Cancer Mother         glioblastoma     Brain Cancer Maternal Uncle        Social Hx:  MA at [employer]    ROS: Negative for fever, weight loss, change in appetite, bone pain/swelling, headaches, vision or hearing problems, cough, nausea, vomiting, diarrhea, or mood changes.     PHYSICAL EXAMINATION:     There were no vitals taken for this visit.  GENERAL:  Well appearing and well nourished, in no acute distress.     NEURO: Normal mentation and speech  PSYCH: Normal mood/affect  EYES:  Clear.  Conjunctivae " normal.     SKIN-  Lip xerosis with fissuring  No acneiform lesions.   Decreased density of hair on the vertex scalp with negative hair pull test. No scaling or erosions. Normal eyebrows and lashes.           Johanna Joseph MD

## 2020-12-02 NOTE — NURSING NOTE
Chief Complaint   Patient presents with     RECHECK     follow up visit for [facility]     Pediatric Dermatology Clinic - Accutane Questionaire    Dry Lips: Moderate    Dry or Blood Shot Eyes: No    Dry Skin: MILD    Muscle Aches or Pains: No    Nose Bleeds: No    Frequent Headaches: No    Mood Swings: No    Depression: No    Suicidal Thoughts: No    Toenail/Fingernail Inflammation: No    Rash: No    Trouble with Night Vision: No    Severe Sun Sensitivity or Sunburn: NO    School or Social problems:  NO    Change in past medical, family or social history: NO    I am aware that I should not share medications or donate blood while taking these medications: Yes    Severity of Acne per scale: Almost Clear    Improvement since the beginning of medication use, on the scale: Marked Almost Clear    Survey completed by:  Patient    Jeni aGrnett CMA

## 2020-12-02 NOTE — PROGRESS NOTES
Pediatric Dermatology Clinic Note      Alva Joshi  29 year old  [id number]    CC: Patient presents with:  RECHECK: follow up visit for accutane  f/up    Assessment and Plan:  1. Acne vulgaris:  Acne is inflammatory with post inflammatory pigment changes and scarring. Acne continues to improve. Minimal side effects.   -Goal cumulative dose will be 75835-53220 mg.  -Continue isotretinoin at 80 mg daily  -Cumulative dose is 57946 mg        2. Medication monitoring encounter for isotretinoin:  We discussed/reviewed the potential adverse effects including, but not limited to pseudotumor cerebri, dyslipidemia, LFT abnormalities, dry skin, dry eyes, dry mouth, photosensitivity, myalgias, arthralgias and suicidal ideations.  The risk of severe birth defects with pregnancies was also reviewed.  The patient was advised not to give blood or to share his/her medication with others per the iPLEDGE protocol.    Labs normal last on 8/5/20    3. Cheilitis: hydrocortisone 2.5% ointment bid as needed. Vaseline frequently to lips.     4. Seborrheic dermatitis of the scalp: ketoconazole shampoo three times per week and mometasone solution nightly as needed. No issues.     5. Hair loss- noted that this is related to the isotretinoin and will improve after the course is complete. We could also decrease to a lower dose, but this would prolong the treatment course.     RTC in 1 months by phone    Thank you for involving me in this patient's care.     Johanna Joseph MD  Pediatric Dermatology Staff    CC:       [facility], [facility]    ______________________________________________________________________    HPI:   Alva Joshi is a 29 year old female presenting for reevaluation of acne on isotretinoin.Continues on 80 mg daily. Notes ongoing lip dryness. Acne is improved with no new lesions. Having some hair thinning in the last month with diffuse shedding. No scalp itch or pain.         Past treatments: doxycycline,  "minocycline, spironolactone, OCP, clindamycin lotion.       Past Medical History:   Diagnosis Date     Endometriosis      Gastro-oesophageal reflux disease      Migraines      Numbness and tingling     right shoulder \"Pins and Needles\"     PONV (postoperative nausea and vomiting)        Allergies   Allergen Reactions     Augmentin Shortness Of Breath     Penicillins      Zithromax [Azithromycin] Diarrhea       Current Outpatient Medications   Medication     buPROPion (WELLBUTRIN XL) 300 MG 24 hr tablet     hydrocortisone 2.5 % ointment     ISOtretinoin (ACCUTANE) 40 MG capsule     ISOtretinoin (ACCUTANE) 40 MG capsule     ISOtretinoin (ACCUTANE) 40 MG capsule     ISOtretinoin (ACCUTANE) 40 MG capsule     ISOtretinoin (ACCUTANE) 40 MG capsule     ketoconazole (NIZORAL) 2 % external shampoo     levonorgestrel (MIRENA) 20 MCG/24HR IUD     metoprolol succinate ER (TOPROL-XL) 50 MG 24 hr tablet     mometasone (ELOCON) 0.1 % external solution     mupirocin (BACTROBAN) 2 % external ointment     No current facility-administered medications for this visit.        Family History   Problem Relation Age of Onset     Breast Cancer Maternal Grandmother      Diabetes Paternal Grandmother      Breast Cancer Other         great grandmother, 2 maternal great aunts - dx 30's, no genetic testing     Ovarian Cancer Other         paternal great aunt     Brain Cancer Mother         glioblastoma     Brain Cancer Maternal Uncle        Social Hx:  MA at [employer]    ROS: Negative for fever, weight loss, change in appetite, bone pain/swelling, headaches, vision or hearing problems, cough, nausea, vomiting, diarrhea, or mood changes.     PHYSICAL EXAMINATION:     There were no vitals taken for this visit.  GENERAL:  Well appearing and well nourished, in no acute distress.     NEURO: Normal mentation and speech  PSYCH: Normal mood/affect  EYES:  Clear.  Conjunctivae normal.     SKIN-  Lip xerosis with fissuring  No acneiform lesions.   Decreased " density of hair on the vertex scalp with negative hair pull test. No scaling or erosions. Normal eyebrows and lashes.

## 2020-12-02 NOTE — LETTER
Date:December 7, 2020      Patient was self referred, no letter generated. Do not send.        [facility] Information

## 2020-12-02 NOTE — PATIENT INSTRUCTIONS
[facility]- Pediatric Dermatology  Dr. Fatemeh Santillan, Dr. Raymon White, Dr. Johanna Joseph, MARIEL Richards Dr., Dr. Minerva Alicea & Dr. Ruy Martinez       Non Urgent  Nurse Triage Line; [phone]- Queenie and Adelaide COLLINS Care Coordinators      Gricelda (/Complex ) [phone]      If you need a prescription refill, please contact your pharmacy. Refills are approved or denied by our Physicians during normal business hours, Monday through Fridays    Per office policy, refills will not be granted if you have not been seen within the past year (or sooner depending on your child's condition)      Scheduling Information:     Pediatric Appointment Scheduling and Call Center [phone]   Radiology Scheduling- [phone]     Sedation Unit Scheduling- [phone]    [place] Scheduling- [facility] [phone]; Pediatric Dermatology [phone]    Main  Services: [phone]   Chinese: [phone]   Singaporean: [phone]   Hmong/Upper sorbian/Tamazight: [phone]      Preadmission Nursing Department Fax Number: [phone] (Fax all pre-operative paperwork to this number)      For urgent matters arising during evenings, weekends, or holidays that cannot wait for normal business hours please call [phone] and ask for the Dermatology Resident On-Call to be paged.           Pediatric Dermatology  [facility]  ?[facility] [address] [address]  [phone]    ATOPIC DERMATITIS  WHAT IS ATOPIC DERMATITIS?  Atopic dermatitis (also called Eczema) is a condition of the skin where the skin is dry, red, and itchy. The main function of the skin is to provide a barrier from the environment and is also the first defense of the immune system.    In atopic dermatitis the skin barrier is decreased, and the skin is easily irritated. Also, the skin s immune system is different. If there are increased allergic type cells in  the skin, the skin may become red and  hyper-excitable.  This leads to itching and a subsequent rash.    WHY DO PEOPLE GET ATOPIC DERMATITIS?  There is no single answer because many factors are involved. It is likely a combination of genetic makeup and environmental triggers and /or exposures; Excessive drying or sweating of the skin, irritating soaps, dust mites, and pet dander area some of the more common triggers. There are no blood tests that can be done to confirm this diagnosis. This history and appearance of the skin is usually sufficient for a diagnosis. However, in some cases if the rash does not fit with the history or respond appropriately to treatment, a skin biopsy may be helpful. Many children do outgrow atopic dermatitis or get better; however, many continue to have sensitive skin into adulthood.    Asthma and hay fever area seen in many patients with atopic dermatitis; however, asthma flares do not necessarily occur at the same time as skin flare ups.     PREVENTING FLARES OF ATOPIC DERMATITIS  The first step is to maintain the skin s barrier function. Keep the skin well moisturized. Avoid irritants and triggers. Use prescription medicine when there are red or rough areas to help the skin to return to normal as quickly as possible. Try to limit scratching.    IF EVERYTHING IS BEING DONE AS IT SHOULD, WHY DOES THE RASH KEEP FLARING?  If you keep the skin well moisturized, and avoid coming in contact with things you know irritate your child s skin, there will be less flares. However, some flares of atopic dermatitis are beyond your control. You should work with your physician to come up with a plan that minimizes flares while minimizing long term use of medications that suppress the immune system.    WHAT ARE THE TRIGGERS?    Triggers are different for different people. The most common triggers are:    Heat and sweat for some individuals and cold weather for others    House dust mites, pet fur    Wool;  synthetic fabrics like nylon; dyed fabrics    Tobacco smoke    Fragrance in; shampoos, soaps, lotions, laundry detergents, fabric softeners    Saliva or prolonged exposure to water    WHAT ABOUT FOOD ALLERGIES?  This is a very controversial topic; as many believe that food allergies are responsible for skin flares. In some cases, specific foods may cause worsening of atopic dermatitis. However, this occurs in a minority of cases and usually happens within a few hours of ingestion. While food allergy is more common in children with eczema, foods are specific triggers for flares in only a small percentage of children. If you notice that the skin flares after certain food, you can see if eliminating one food at a time makes a difference, as long as your child can still enjoy a well-balanced diet.    There are blood (RAST) and skin (PRICK) tests that can check for allergies, but they are often positive in children who are not truly allergic. Therefore, it is important that you work with your allergist and dermatologist to determine which foods are relevant and causing true symptoms. Extreme food elimination diets without the guidance of your doctor, which have become more popular in recent years, may even results in worsening of the skin rash due to malnutrition and avoidance of essential nutrients.    TREATMENT:   Treatments are aimed at minimizing exposure to irritating factors and decreasing the skin inflammation which results in an itchy rash.    There are many different treatment options, which depend on your child s rash, its location and severity. Topical treatments include corticosteroids and steroid-like creams such as Protopic and Elidel which do not thin the skin. Please read the discussions below regarding risks and benefits of all these creams.    Occasionally bacterial or viral infections can occur which flare the skin and require oral and/or topical antibiotics or antiviral. In some cases bleach baths 2-3  times weekly can be helpful to prevent recurrent infection.    For severe disease, strong oral medications such as methotrexate or azathioprine (Imuran) may be needed. There medications require close monitoring and follow-up. You should discuss the risks/benefits/alternatives or these medications with your dermatologist to come up with the best treatment plan for your child.    Further Information:  There is much more information available from the [facility] website: [email or web address].org     Gentle Skin Care  Below is a list of products our providers recommend for gentle skin care.  Moisturizers:    Lighter; Cetaphil Cream, CeraVe, Aveeno and Vanicream Light     Thicker; Aquaphor Ointment, Vaseline, Petrolium Jelly, Eucerin and Vanicream    Avoid Lotions (too thin)  Mild Cleansers:    Dove- Fragrance Free    CeraVe     Vanicream Cleansing Bar    Cetaphil Cleanser     Aquaphor 2 in1 Gentle Wash and Shampoo       Laundry Products:    All Free and Clear    Cheer Free    Generic Brands are okay as long as they are  Fragrance Free      Avoid fabric softeners  and dryer sheets   Sunscreens: SPF 30 or greater     Sunscreens that contain Zinc Oxide or Titanium Dioxide should be applied, these are physical blockers. Spray or  chemical  sunscreens should be avoided.        Shampoo and Conditioners:    Free and Clear by Vanicream    Aquaphor 2 in 1 Gentle Wash and Shampoo    California Baby  super sensitive   Oils:    Mineral Oil     Emu Oil     For some patients, coconut and sunflower seed oil      Generic Products are an okay substitute, but make sure they are fragrance free.  *Avoid product that have fragrance added to them. Organic does not mean  fragrance free.  In fact patients with sensitive skin can become quite irritated by organic products.     1. Daily bathing is recommended. Make sure you are applying a good moisturizer after bathing every time.  2. Use Moisturizing creams at least  "twice daily to the whole body. Your provider may recommend a lighter or heavier moisturizer based on your child s severity and that time of year it is.  3. Creams are more moisturizing than lotions  4. Products should be fragrance free- soaps, creams, detergents.  Products such as Matthias and Matthias as well as the Cetaphil \"Baby\" line contain fragrance and may irritate your child's sensitive skin.    Care Plan:  1. Keep bathing and showering short, less than 15 minutes   2. Always use lukewarm warm when possible. AVOID very HOT or COLD water  3. DO NOT use bubble bath  4. Limit the use of soaps. Focus on the skin folds, face, armpits, groin and feet  5. Do NOT vigorously scrub when you cleanse your skin  6. After bathing, PAT your skin lightly with a towel. DO NOT rub or scrub when drying  7. ALWAYS apply a moisturizer immediately after bathing. This helps to  lock in  the moisture. * IF YOU WERE PRESCRIBED A TOPICAL MEDICATION, APPLY YOUR MEDICATION FIRST THEN COVER WITH YOUR DAILY MOISTURIZER  8. Reapply moisturizing agents at least twice daily to your whole body  9. Do not use products such as powders, perfumes, or colognes on your skin  10. Avoid saunas and steam baths. This temperature is too HOT  11. Avoid tight or  scratchy  clothing such as wool  12. Always wash new clothing before wearing them for the first time  13. Sometimes a humidifier or vaporizer can be used at night can help the dry skin. Remember to keep it clean to avoid mold growth.    "

## 2020-12-03 ENCOUNTER — APPOINTMENT (OUTPATIENT)
Dept: LAB | Facility: CLINIC | Age: 30
End: 2020-12-03
Payer: COMMERCIAL

## 2020-12-03 LAB — HCG SERPL QL: NEGATIVE

## 2020-12-14 ENCOUNTER — HEALTH MAINTENANCE LETTER (OUTPATIENT)
Age: 30
End: 2020-12-14

## 2021-01-04 ENCOUNTER — OFFICE VISIT (OUTPATIENT)
Dept: DERMATOLOGY | Facility: CLINIC | Age: 31
End: 2021-01-04
Attending: DERMATOLOGY
Payer: COMMERCIAL

## 2021-01-04 ENCOUNTER — APPOINTMENT (OUTPATIENT)
Dept: LAB | Facility: CLINIC | Age: 31
End: 2021-01-04
Attending: DERMATOLOGY
Payer: COMMERCIAL

## 2021-01-04 DIAGNOSIS — Z51.81 MEDICATION MONITORING ENCOUNTER: Primary | ICD-10-CM

## 2021-01-04 LAB — HCG SERPL QL: NEGATIVE

## 2021-01-04 PROCEDURE — G0463 HOSPITAL OUTPT CLINIC VISIT: HCPCS

## 2021-01-04 PROCEDURE — 99214 OFFICE O/P EST MOD 30 MIN: CPT | Performed by: DERMATOLOGY

## 2021-01-04 PROCEDURE — 84703 CHORIONIC GONADOTROPIN ASSAY: CPT | Performed by: DERMATOLOGY

## 2021-01-04 PROCEDURE — 36415 COLL VENOUS BLD VENIPUNCTURE: CPT | Performed by: DERMATOLOGY

## 2021-01-04 RX ORDER — ISOTRETINOIN 40 MG/1
80 CAPSULE ORAL DAILY
Qty: 60 CAPSULE | Refills: 0 | Status: SHIPPED | OUTPATIENT
Start: 2021-01-04 | End: 2021-04-16

## 2021-01-04 NOTE — LETTER
Date:January 5, 2021      Patient was self referred, no letter generated. Do not send.        [facility] Health Information

## 2021-01-04 NOTE — LETTER
1/4/2021      RE: Alva Joshi  [address] [place] [address]           Pediatric Dermatology Clinic Note      Alva Joshi  [id number]    CC: Patient presents with:  RECHECK: follow up visit for accutane  f/up    Assessment and Plan:  1. Acne vulgaris:  Acne is inflammatory with post inflammatory pigment changes and scarring. Acne continues to improve but ongoing new lesions. Minimal side effects.   -Goal cumulative dose will be 40703-34884 mg.  -Continue isotretinoin at 80 mg daily  -Cumulative dose is 81937 mg        2. Medication monitoring encounter for isotretinoin:  We discussed/reviewed the potential adverse effects including, but not limited to pseudotumor cerebri, dyslipidemia, LFT abnormalities, dry skin, dry eyes, dry mouth, photosensitivity, myalgias, arthralgias and suicidal ideations.  The risk of severe birth defects with pregnancies was also reviewed.  The patient was advised not to give blood or to share his/her medication with others per the iPLEDGE protocol.    Labs normal last on 8/5/20  Pregnancy test today    3. Cheilitis: hydrocortisone 2.5% ointment bid as needed. Vaseline frequently to lips.     4. Seborrheic dermatitis of the scalp: Stable and chronic.  Controlled with ketoconazole shampoo H&S shampoo, mometasone solution nightly as needed. No issues.     5. Hair loss-Stable and chronic. noted that this is related to the isotretinoin and will improve after the course is complete.     RTC in 1 months by phone    Thank you for involving me in this patient's care.     Johanna Joseph MD  Pediatric Dermatology Staff    CC:       [facility]    ______________________________________________________________________    HPI:   Alva Joshi is a 30 year old female presenting for reevaluation of acne on isotretinoin.Continues on 80 mg daily. Notes ongoing lip dryness and headaches, but no worse than previous. Notes ongoing hair shedding which is stable.  "Seborrheic dermatitis is stable with H&S shampoo.        Past treatments: doxycycline, minocycline, spironolactone, OCP, clindamycin lotion.       Past Medical History:   Diagnosis Date     Endometriosis      Gastro-oesophageal reflux disease      Migraines      Numbness and tingling     right shoulder \"Pins and Needles\"     PONV (postoperative nausea and vomiting)        Social Hx:  MA at [employer]    ROS: Negative for fever, weight loss, change in appetite, bone pain/swelling, vision or hearing problems, cough, nausea, vomiting, diarrhea, or mood changes. +Headaches    PHYSICAL EXAMINATION:     There were no vitals taken for this visit.  GENERAL:  Well appearing and well nourished, in no acute distress.     NEURO: Normal mentation and speech  PSYCH: Normal mood/affect  EYES:  Clear.  Conjunctivae normal.     SKIN-  Lip xerosis   Pink papules on the R temple and L upper forehead          Johanna Joseph MD  "

## 2021-01-04 NOTE — PATIENT INSTRUCTIONS
[facility]- Pediatric Dermatology  Dr. Fatemeh Santillan, Dr. Raymon White, Dr. Johanna Joseph, Janelle Paez, MARIEL Paez, Dr. Minerva Alicea & Dr. Ruy Martinez       Non Urgent  Nurse Triage Line; [phone]- Queenie and Adelaide COLLINS Care Coordinators      Gricelda (/Complex ) [phone]      If you need a prescription refill, please contact your pharmacy. Refills are approved or denied by our Physicians during normal business hours, Monday through Fridays    Per office policy, refills will not be granted if you have not been seen within the past year (or sooner depending on your child's condition)      Scheduling Information:     Pediatric Appointment Scheduling and Call Center [phone]   Radiology Scheduling- [phone]     Sedation Unit Scheduling- [phone]    [place] Scheduling- [facility] [phone]; Pediatric Dermatology [phone]    Main  Services: [phone]   Yi: [phone]   Rwandan: [phone]   Hmong/Macedonian/Yoruba: [phone]      Preadmission Nursing Department Fax Number: [phone] (Fax all pre-operative paperwork to this number)      For urgent matters arising during evenings, weekends, or holidays that cannot wait for normal business hours please call [phone] and ask for the Dermatology Resident On-Call to be paged.

## 2021-01-04 NOTE — PROGRESS NOTES
Pediatric Dermatology Clinic Note      Alva Joshi  [id number]    CC: Patient presents with:  RECHECK: follow up visit for accutane  f/up    Assessment and Plan:  1. Acne vulgaris:  Acne is inflammatory with post inflammatory pigment changes and scarring. Acne continues to improve but ongoing new lesions. Minimal side effects.   -Goal cumulative dose will be 07139-[address]84 mg.  -Continue isotretinoin at 80 mg daily  -Cumulative dose is 90542 mg        2. Medication monitoring encounter for isotretinoin:  We discussed/reviewed the potential adverse effects including, but not limited to pseudotumor cerebri, dyslipidemia, LFT abnormalities, dry skin, dry eyes, dry mouth, photosensitivity, myalgias, arthralgias and suicidal ideations.  The risk of severe birth defects with pregnancies was also reviewed.  The patient was advised not to give blood or to share his/her medication with others per the iPLEDGE protocol.    Labs normal last on 8/5/20  Pregnancy test today    3. Cheilitis: hydrocortisone 2.5% ointment bid as needed. Vaseline frequently to lips.     4. Seborrheic dermatitis of the scalp: Stable and chronic.  Controlled with ketoconazole shampoo H&S shampoo, mometasone solution nightly as needed. No issues.     5. Hair loss-Stable and chronic. noted that this is related to the isotretinoin and will improve after the course is complete.     RTC in 1 months by phone    Thank you for involving me in this patient's care.     Johanna Joseph MD  Pediatric Dermatology Staff    CC:       Clinic, [facility]    ______________________________________________________________________    HPI:   Alva Joshi is a 30 year old female presenting for reevaluation of acne on isotretinoin.Continues on 80 mg daily. Notes ongoing lip dryness and headaches, but no worse than previous. Notes ongoing hair shedding which is stable. Seborrheic dermatitis is stable with H&S shampoo.        Past treatments: doxycycline,  "minocycline, spironolactone, OCP, clindamycin lotion.       Past Medical History:   Diagnosis Date     Endometriosis      Gastro-oesophageal reflux disease      Migraines      Numbness and tingling     right shoulder \"Pins and Needles\"     PONV (postoperative nausea and vomiting)        Social Hx:  MA at [employer]    ROS: Negative for fever, weight loss, change in appetite, bone pain/swelling, vision or hearing problems, cough, nausea, vomiting, diarrhea, or mood changes. +Headaches    PHYSICAL EXAMINATION:     There were no vitals taken for this visit.  GENERAL:  Well appearing and well nourished, in no acute distress.     NEURO: Normal mentation and speech  PSYCH: Normal mood/affect  EYES:  Clear.  Conjunctivae normal.     SKIN-  Lip xerosis   Pink papules on the R temple and L upper forehead      "

## 2021-01-04 NOTE — NURSING NOTE
Chief Complaint   Patient presents with     RECHECK     follow up visit for [facility]     Pediatric Dermatology Clinic - Accutane Questionaire    Dry Lips: Moderate    Dry or Blood Shot Eyes: No    Dry Skin: No    Muscle Aches or Pains: No    Nose Bleeds: No    Frequent Headaches: Yes    Mood Swings: No    Depression: No    Suicidal Thoughts: No    Toenail/Fingernail Inflammation: No    Rash: No    Trouble with Night Vision: No    Severe Sun Sensitivity or Sunburn: No    School or Social problems: No    Change in past medical, family or social history: No    I am aware that I should not share medications or donate blood while taking these medications: Yes    Severity of Acne per scale: Almost Clear    Improvement since the beginning of medication use, on the scale: Marked Almost Clear    Survey completed by:  Patient    Jeni Garnett CMA

## 2021-01-21 ENCOUNTER — TELEPHONE (OUTPATIENT)
Dept: CARDIOLOGY | Facility: CLINIC | Age: 31
End: 2021-01-21

## 2021-01-24 DIAGNOSIS — F33.1 MODERATE EPISODE OF RECURRENT MAJOR DEPRESSIVE DISORDER (H): ICD-10-CM

## 2021-01-24 RX ORDER — BUPROPION HYDROCHLORIDE 300 MG/1
TABLET ORAL
Qty: 90 TABLET | Refills: 1 | Status: SHIPPED | OUTPATIENT
Start: 2021-01-24 | End: 2021-07-08

## 2021-02-02 ENCOUNTER — CARE COORDINATION (OUTPATIENT)
Dept: CARDIOLOGY | Facility: CLINIC | Age: 31
End: 2021-02-02

## 2021-02-02 NOTE — PROGRESS NOTES
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     Patient states understanding and agrees to call with any questions or concerns.

## 2021-02-03 ENCOUNTER — VIRTUAL VISIT (OUTPATIENT)
Dept: DERMATOLOGY | Facility: CLINIC | Age: 31
End: 2021-02-03
Attending: DERMATOLOGY
Payer: COMMERCIAL

## 2021-02-03 DIAGNOSIS — L70.0 ACNE VULGARIS: ICD-10-CM

## 2021-02-03 DIAGNOSIS — Z51.81 MEDICATION MONITORING ENCOUNTER: Primary | ICD-10-CM

## 2021-02-03 DIAGNOSIS — Z51.81 MEDICATION MONITORING ENCOUNTER: ICD-10-CM

## 2021-02-03 DIAGNOSIS — R00.2 PALPITATIONS: ICD-10-CM

## 2021-02-03 LAB
B-HCG SERPL-ACNC: <1 IU/L (ref 0–5)
INTERPRETATION ECG - MUSE: NORMAL

## 2021-02-03 PROCEDURE — 999N001193 HC VIDEO/TELEPHONE VISIT; NO CHARGE

## 2021-02-03 PROCEDURE — 84702 CHORIONIC GONADOTROPIN TEST: CPT | Performed by: DERMATOLOGY

## 2021-02-03 PROCEDURE — 36415 COLL VENOUS BLD VENIPUNCTURE: CPT | Performed by: DERMATOLOGY

## 2021-02-03 PROCEDURE — 99214 OFFICE O/P EST MOD 30 MIN: CPT | Mod: GQ | Performed by: DERMATOLOGY

## 2021-02-03 RX ORDER — ISOTRETINOIN 40 MG/1
80 CAPSULE ORAL DAILY
Qty: 60 CAPSULE | Refills: 0 | Status: SHIPPED | OUTPATIENT
Start: 2021-02-03 | End: 2021-04-16

## 2021-02-03 NOTE — PROGRESS NOTES
"Electrophysiology Clinic Video Virtual Visit    Alva Joshi is a 30 year old female who is being evaluated via a billable video visit.      The patient has been notified of following:     \"This video visit will be conducted via a call between you and your physician/provider. We have found that certain health care needs can be provided without the need for an in-person physical exam.  This service lets us provide the care you need with a video conversation.  If a prescription is necessary we can send it directly to your pharmacy.  If lab work is needed we can place an order for that and you can then stop by our lab to have the test done at a later time.    If during the course of the call the physician/provider feels a video visit is not appropriate, you will not be charged for this service.\"     Physician has received verbal consent for a Video Visit from the patient? Yes    Patient would like the video invitation sent by: [employer]    Video Start Time: 10 am    Video Stop Time: 10:10 am    Mode of Communication:  Video Conference via [employer]    Originating Location (pt. Location): Home    Distant Location (provider location): [facility] HEART [facility]    Mode of Communication:  Video Conference via [employer]      HPI:   30 yo for follow up of palpitations.  I had a virtual visit with her 6/23/2020. She works at [employer] and had intermittent palpitations with heart rates up to 170s, seen in ED, given propranolol to take as needed. Recurrent episodes and visits to ED showed sinus tachycardia with symptoms. She was just started on daily doses of metoprolol when I met her last June.     Since then, she's been doing well, feels better overall from a palpitations standpoint on metoprolol, however it does make her feel tired. She's had 2 notable episodes of palpitations:  September 3 - \"head rush\", hot, dizzy; sitting at desk at work putting in orders, checked her watch and HR was 140s, went home and took " metoprolol (had forgot night before), lasted 2 hours  December - gradual increase in heart rate, lasted 3 hours    No syncope.    PAST MEDICAL HISTORY:  Endometriosis    CURRENT MEDICATIONS:  Metoprolol succinate 50 qhs  Wellbutrin  Mirena IUD  Accutane    ALLERGIES:     Allergies   Allergen Reactions     Augmentin Shortness Of Breath     Penicillins      Zithromax [Azithromycin] Diarrhea       FAMILY HISTORY:  Family History   Problem Relation Age of Onset     Breast Cancer Maternal Grandmother      Diabetes Paternal Grandmother      Breast Cancer Other         great grandmother, 2 maternal great aunts - dx 30's, no genetic testing     Ovarian Cancer Other         paternal great aunt     Brain Cancer Mother         glioblastoma     Brain Cancer Maternal Uncle        SOCIAL HISTORY:  Social History     Tobacco Use     Smoking status: Never Smoker     Smokeless tobacco: Never Used   Substance Use Topics     Alcohol use: Yes     Comment: socially     Drug use: No       ROS:  10 point ROS neg other than the symptoms noted above in the HPI.    Labs:  2020: cholesterol 228, HDL 34, , , K 3.9, cr 0.89, hgb 14, plt 300K     Testing/Procedures:  ECHOCARDIOGRAM: 6/15/2020: EF 60-65%, no valve disease     EK/15/2020 at 2:37 pm: sinus tachycardia at 151 bpm     EXTERNAL MONITOR: -2020: sinus, Range  bpm, average 84 bpm; patient triggered event associated with sinus tachycardia    EKGs Reviewed today:  sent via H[employer] MOBILITY:  2020: sinus tach 141 bpm  9/3/2020: sinus tach 144 bpm    At [facility] 2/3/2020: sinus 86 bpm    Exam:  The rest of a comprehensive physical examination is deferred due to public health emergency video visit restrictions.  CONSITUTIONAL: no acute distress  HEENT: no icterus, no redness or discharge, neck supple  CV: no visible edema of visualized extremities. No JVD.   RESPIRATORY: respirations nonlabored, no cough  NEURO: AA&Ox3, speech fluent/appropriate, motor  grossly nonfocal  PSYCH: cooperative, affect appropriate  DERM: no rashes on visualized face/neck/upper extremities      Assessment and Plan:   Sinus tachycardia, appears to be related to vagal reactions, no specific triggers. Two episodes in the last 6 months, she believes she missed metoprolol both times because she woke up feeling NOT tired those mornings. She asks if any adjustments can be done to reduce fatigue. Will change metoprolol succinate to 25 mg bid - if she feels less tired without palpitations can further reduce to once a day. Alternative if fatigue persists can switch to verapamil.     She inquired about safety of taking either verapamil or metoprolol if she becomes pregnant. They are both category C for pregnancy, although can be used if benefit outweighs risk. For intermittent symptomatic sinus tachycardia I would advise not to take it during pregnancy unless she has severe symptoms such as frequently syncope. Hopefully pregnancy can be planned and we can gradually taper off meds before pregnancy.      In addition to video time documented above, I spent an additional 15 minutes on data review and documentation.    I have reviewed the note as documented above.  This accurately captures the substance of my virtual visit with the patient. The patient states understanding and is agreeable with the plan.     Rosalinda Thomas MD  Cardiology

## 2021-02-03 NOTE — LETTER
"  2/3/2021      RE: Alva Joshi  [address] [place] [address]       Alva is a 30 year old female who is being evaluated via a billable teledermatology visit.             The patient has been notified of following:            \"We have asked you to send in photos via Service[employer] or e-mail. These photos will be seen and reviewed by an MD or PA-C.  A telederm visit is not as thorough as an in-person visit, photo assessment does not replace an in-person skin exam.  The quality of the photograph sent may not be of the same quality as that taken by the dermatology clinic. With that being said, we have found that certain health care needs can be provided without the need for a physical exam.  This service lets us provide the care you need with a short phone conversation. If prescriptions are needed we can send directly to your pharmacy.If lab work is needed we can place an order for that and you can then stop by our lab to have the test done at a later time. An MD/PA/Resident will call you around the time of your visit. This may be from a blocked number.     This is a billable visit. If during the course of the call the physician/provider feels a telephone visit is not appropriate, you will not be charged for this service.            Patient has given verbal consent for Telephone visit?  Yes           The patient would like to proceed with an teledermatology because of the COVID Pandemic.     Patient complains of    Accutane follow up       ALLERGIES REVIEWED?  yes        Pediatric Dermatology Clinic - Accutane Questionaire    Dry Lips: Moderate    Dry or Blood Shot Eyes: No    Dry Skin: No    Muscle Aches or Pains: No    Nose Bleeds: No    Frequent Headaches: Yes    Mood Swings: No    Depression: No    Suicidal Thoughts: No    Toenail/Fingernail Inflammation: No    Rash: No    Trouble with Night Vision: No    Severe Sun Sensitivity or Sunburn: No    School or Social problems: No    Change in past medical, " family or social history: No    I am aware that I should not share medications or donate blood while taking these medications: Yes    Severity of Acne per scale: Clear    Improvement since the beginning of medication use, on the scale: Clear    Survey completed by:  Patient    Jeni MI Garnett        [facility] ((National Emergency Concerning the CORONAVIRUS (COVID 19), preferred for return patients. )    Image quality and interpretability: acceptable    Physician has received verbal consent for a Video/Photos Visit from the patient? Yes    In-person dermatology visit recommendation: no    Consent has been obtained for this service by 1 care team member: yes.     Teledermatology information:  - Location of patient: Home  - Location of teledermatologist:  ([facility] (Dr. Joseph, [place], [place])  - Reason teledermatology is appropriate:  of National Emergency Regarding Coronavirus disease (COVID 19) Outbreak  - Method of transmission:  Store and Forward ((National Emergency Concerning the CORONAVIRUS (COVID 19), preferred for return patients.   - Date of images: 02/03/21  - Service start time:0929  - Service end time:0935  - Additional time spent on day of service: None  - Date of report: 02/03/21      ___________________________________________________________________________            Pediatric Dermatology Clinic Note      Alva Joshi  [id number]    CC: Patient presents with:  TELEDERM: phone visit with photo review  f/up    Assessment and Plan:  1. Acne vulgaris:  Acne is inflammatory with post inflammatory pigment changes and scarring. Acne continues to improve but ongoing new lesions. Minimal side effects.   -Goal cumulative dose will be 44148-17317 mg.  -Continue isotretinoin at 80 mg daily (last month of treatment)   -Cumulative dose is 72714 mg        2. Medication monitoring encounter for isotretinoin:  We discussed/reviewed  "the potential adverse effects including, but not limited to pseudotumor cerebri, dyslipidemia, LFT abnormalities, dry skin, dry eyes, dry mouth, photosensitivity, myalgias, arthralgias and suicidal ideations.  The risk of severe birth defects with pregnancies was also reviewed.  The patient was advised not to give blood or to share his/her medication with others per the iPLEDGE protocol.    Labs normal last on 8/5/20  Pregnancy test today    3. Cheilitis: hydrocortisone 2.5% ointment bid as needed. Vaseline frequently to lips.     4. Seborrheic dermatitis of the scalp: Stable and chronic.  Controlled with ketoconazole shampoo H&S shampoo, mometasone solution nightly as needed. No issues.     5. Hair loss-Stable and improved. noted that this is related to the isotretinoin and will improve after the course is complete.     RTC prn.     Thank you for involving me in this patient's care.     Johanna Joseph MD  Pediatric Dermatology Staff    CC:     [facility]    ______________________________________________________________________    HPI:   Alva Joshi is a 30 year old female presenting for reevaluation of acne on isotretinoin.Continues on 80 mg daily. Notes that hair loss has tapered off. No new acne lesions.         Past treatments: doxycycline, minocycline, spironolactone, OCP, clindamycin lotion.       Past Medical History:   Diagnosis Date     Endometriosis      Gastro-oesophageal reflux disease      Migraines      Numbness and tingling     right shoulder \"Pins and Needles\"     PONV (postoperative nausea and vomiting)        Social Hx:  MA at [employer]    ROS: Negative for fever, weight loss, change in appetite, bone pain/swelling, vision or hearing problems, cough, nausea, vomiting, diarrhea, or mood changes. +Headaches    PHYSICAL EXAMINATION:     There were no vitals taken for this visit.  GENERAL:  Well appearing and well nourished, in no acute distress.     NEURO: Normal mentation and " speech  PSYCH: Normal mood/affect  EYES:  Clear.  Conjunctivae normal.     SKIN-  Lip xerosis   Erosion on the nasal dorsum          Johanna Joseph MD

## 2021-02-03 NOTE — PROGRESS NOTES
"Alva is a 30 year old female who is being evaluated via a billable teledermatology visit.             The patient has been notified of following:            \"We have asked you to send in photos via [employer] or e-mail. These photos will be seen and reviewed by an MD or PANeoC.  A telederm visit is not as thorough as an in-person visit, photo assessment does not replace an in-person skin exam.  The quality of the photograph sent may not be of the same quality as that taken by the dermatology clinic. With that being said, we have found that certain health care needs can be provided without the need for a physical exam.  This service lets us provide the care you need with a short phone conversation. If prescriptions are needed we can send directly to your pharmacy.If lab work is needed we can place an order for that and you can then stop by our lab to have the test done at a later time. An MD/PA/Resident will call you around the time of your visit. This may be from a blocked number.     This is a billable visit. If during the course of the call the physician/provider feels a telephone visit is not appropriate, you will not be charged for this service.            Patient has given verbal consent for Telephone visit?  Yes           The patient would like to proceed with an teledermatology because of the COVID Pandemic.     Patient complains of    Accutane follow up       ALLERGIES REVIEWED?  yes        Pediatric Dermatology Clinic - Accutane Questionaire    Dry Lips: Moderate    Dry or Blood Shot Eyes: No    Dry Skin: No    Muscle Aches or Pains: No    Nose Bleeds: No    Frequent Headaches: Yes    Mood Swings: No    Depression: No    Suicidal Thoughts: No    Toenail/Fingernail Inflammation: No    Rash: No    Trouble with Night Vision: No    Severe Sun Sensitivity or Sunburn: No    School or Social problems: No    Change in past medical, family or social history: No    I am aware that I should not share medications or " donate blood while taking these medications: Yes    Severity of Acne per scale: Clear    Improvement since the beginning of medication use, on the scale: Clear    Survey completed by:  Patient    Jeni Garnett CMA

## 2021-02-03 NOTE — PATIENT INSTRUCTIONS
[facility]- Pediatric Dermatology  Dr. Fatemeh Santillan, Dr. Raymon White, Dr. Johanna Joseph, Janelle Paez, MARIEL Paez, Dr. Minerva Alicea & Dr. Ruy Martinez       Non Urgent  Nurse Triage Line; [phone]- Queenie and Adelaide COLLINS Care Coordinators      Gricelda (/Complex ) [phone]      If you need a prescription refill, please contact your pharmacy. Refills are approved or denied by our Physicians during normal business hours, Monday through Fridays    Per office policy, refills will not be granted if you have not been seen within the past year (or sooner depending on your child's condition)      Scheduling Information:     Pediatric Appointment Scheduling and Call Center [phone]   Radiology Scheduling- [phone]     Sedation Unit Scheduling- [phone]    Olla Scheduling- [facility] [phone]; Pediatric Dermatology [phone]    Main  Services: [phone]   Uzbek: [phone]   Central African: [phone]   Hmong/Urdu/French: [phone]      Preadmission Nursing Department Fax Number: [phone] (Fax all pre-operative paperwork to this number)      For urgent matters arising during evenings, weekends, or holidays that cannot wait for normal business hours please call [phone] and ask for the Dermatology Resident On-Call to be paged.

## 2021-02-03 NOTE — PROGRESS NOTES
[facility] ((National Emergency Concerning the CORONAVIRUS (COVID 19), preferred for return patients. )    Image quality and interpretability: acceptable    Physician has received verbal consent for a Video/Photos Visit from the patient? Yes    In-person dermatology visit recommendation: no    Consent has been obtained for this service by 1 care team member: yes.     Teledermatology information:  - Location of patient: Home  - Location of teledermatologist:  ([facility] (Dr. Joseph, [place], [place])  - Reason teledermatology is appropriate:  of National Emergency Regarding Coronavirus disease (COVID 19) Outbreak  - Method of transmission:  Store and Forward ((National Emergency Concerning the CORONAVIRUS (COVID 19), preferred for return patients.   - Date of images: 02/03/21  - Service start time:0929  - Service end time:0935  - Additional time spent on day of service: None  - Date of report: 02/03/21      ___________________________________________________________________________            Pediatric Dermatology Clinic Note      Alva   [id number]    CC: Patient presents with:  TELEDERM: phone visit with photo review  f/up    Assessment and Plan:  1. Acne vulgaris:  Acne is inflammatory with post inflammatory pigment changes and scarring. Acne continues to improve but ongoing new lesions. Minimal side effects.   -Goal cumulative dose will be 79360-64461 mg.  -Continue isotretinoin at 80 mg daily (last month of treatment)   -Cumulative dose is 08[address] mg        2. Medication monitoring encounter for isotretinoin:  We discussed/reviewed the potential adverse effects including, but not limited to pseudotumor cerebri, dyslipidemia, LFT abnormalities, dry skin, dry eyes, dry mouth, photosensitivity, myalgias, arthralgias and suicidal ideations.  The risk of severe birth defects with pregnancies was also reviewed.  The patient was advised  "not to give blood or to share his/her medication with others per the iPLEDGE protocol.    Labs normal last on 8/5/20  Pregnancy test today    3. Cheilitis: hydrocortisone 2.5% ointment bid as needed. Vaseline frequently to lips.     4. Seborrheic dermatitis of the scalp: Stable and chronic.  Controlled with ketoconazole shampoo H&S shampoo, mometasone solution nightly as needed. No issues.     5. Hair loss-Stable and improved. noted that this is related to the isotretinoin and will improve after the course is complete.     RTC prn.     Thank you for involving me in this patient's care.     Johanna Joseph MD  Pediatric Dermatology Staff    CC:     [facility], [facility]    ______________________________________________________________________    HPI:   Alva Joshi is a 30 year old female presenting for reevaluation of acne on isotretinoin.Continues on 80 mg daily. Notes that hair loss has tapered off. No new acne lesions.         Past treatments: doxycycline, minocycline, spironolactone, OCP, clindamycin lotion.       Past Medical History:   Diagnosis Date     Endometriosis      Gastro-oesophageal reflux disease      Migraines      Numbness and tingling     right shoulder \"Pins and Needles\"     PONV (postoperative nausea and vomiting)        Social Hx:  MA at [employer]    ROS: Negative for fever, weight loss, change in appetite, bone pain/swelling, vision or hearing problems, cough, nausea, vomiting, diarrhea, or mood changes. +Headaches    PHYSICAL EXAMINATION:     There were no vitals taken for this visit.  GENERAL:  Well appearing and well nourished, in no acute distress.     NEURO: Normal mentation and speech  PSYCH: Normal mood/affect  EYES:  Clear.  Conjunctivae normal.     SKIN-  Lip xerosis   Erosion on the nasal dorsum      "

## 2021-02-04 ENCOUNTER — VIRTUAL VISIT (OUTPATIENT)
Dept: CARDIOLOGY | Facility: CLINIC | Age: 31
End: 2021-02-04
Attending: INTERNAL MEDICINE
Payer: COMMERCIAL

## 2021-02-04 DIAGNOSIS — R00.0 SINUS TACHYCARDIA: ICD-10-CM

## 2021-02-04 PROCEDURE — 99214 OFFICE O/P EST MOD 30 MIN: CPT | Mod: 95 | Performed by: INTERNAL MEDICINE

## 2021-02-04 RX ORDER — METOPROLOL SUCCINATE 25 MG/1
25 TABLET, EXTENDED RELEASE ORAL 2 TIMES DAILY
Qty: 60 TABLET | Refills: 4 | Status: SHIPPED | OUTPATIENT
Start: 2021-02-04 | End: 2024-01-02

## 2021-02-04 NOTE — PATIENT INSTRUCTIONS
"You were evaluated today in the Cardiovascular Telemedicine Clinic at the [facility].     Cardiology Provider during your visit: Dr. Rosalinda Thomas    Diagnosis:  Sinus tachycardia    Results: discussed with patient    Orders:   None    Medication Changes:   OK to take metoprolol succinate 25 mg twice a day      Recommendations:   If you continue to feel tired, we can switch metoprolol to verapamil    Follow-up:   As needed  Please follow up with primary care provider for medication refills         Please feel free to call me with any questions or concerns.       Linda Smith RN     Questions and [phone].   First press #1 for the [employer] and then press #3 for \"Medical Questions\" to reach us Cardiology Nurses.      On Call Cardiologist for after hours or on weekends: [phone]   option #4 and ask to speak to the on-call Cardiologist.          If you need a medication refill please contact your pharmacy.  Please allow 3 business days for your refill to be completed.   "

## 2021-02-04 NOTE — PROGRESS NOTES
"Alva is a 30 year old who is being evaluated via a billable video visit.      How would you like to obtain your AVS? PumpicharNanameue  If the video visit is dropped, the invitation should be resent by:  Pt will connect through [employer]  Will anyone else be joining your video visit? No      Vitals - Patient Reported  Weight (Patient Reported): 104.3 kg (230 lb)  Height (Patient Reported): 154.9 cm (5' 1\")  BMI (Based on Pt Reported Ht/Wt): 43.46  SpO2 (Patient Reported): 100  Pulse (Patient Reported): 80  Pain Score: No Pain (0)(No SOB)    Vitals were taken and medication reconciled.    HOSEA Sánchez  9:28 AM    "

## 2021-02-04 NOTE — LETTER
"2/4/2021      RE: Alva Joshi  [address] [place] [address]       Dear Colleague,    Thank you for the opportunity to participate in the care of your patient, Alva Joshi, at the [facility] [place] [facility]. Please see a copy of my visit note below.    Electrophysiology Clinic Video Virtual Visit    Alva Joshi is a 30 year old female who is being evaluated via a billable video visit.      The patient has been notified of following:     \"This video visit will be conducted via a call between you and your physician/provider. We have found that certain health care needs can be provided without the need for an in-person physical exam.  This service lets us provide the care you need with a video conversation.  If a prescription is necessary we can send it directly to your pharmacy.  If lab work is needed we can place an order for that and you can then stop by our lab to have the test done at a later time.    If during the course of the call the physician/provider feels a video visit is not appropriate, you will not be charged for this service.\"     Physician has received verbal consent for a Video Visit from the patient? Yes    Patient would like the video invitation sent by: [email or web address]    Video Start Time: 10 am    Video Stop Time: 10:10 am    Mode of Communication:  Video Conference via [employer]    Originating Location (pt. Location): Home    Distant Location (provider location): [facility]    Mode of Communication:  Video Conference via [employer]      HPI:   30 yo for follow up of palpitations.  I had a virtual visit with her 6/23/2020. She works at [employer] and had intermittent palpitations with heart rates up to 170s, seen in ED, given propranolol to take as needed. Recurrent episodes and visits to ED showed sinus tachycardia with symptoms. She was just started on daily doses of metoprolol when I " "met her last .     Since then, she's been doing well, feels better overall from a palpitations standpoint on metoprolol, however it does make her feel tired. She's had 2 notable episodes of palpitations:  September 3 - \"head rush\", hot, dizzy; sitting at desk at work putting in orders, checked her watch and HR was 140s, went home and took metoprolol (had forgot night before), lasted 2 hours  December - gradual increase in heart rate, lasted 3 hours    No syncope.    PAST MEDICAL HISTORY:  Endometriosis    CURRENT MEDICATIONS:  Metoprolol succinate 50 qhs  Wellbutrin  Mirena IUD  Accutane    ALLERGIES:     Allergies   Allergen Reactions     Augmentin Shortness Of Breath     Penicillins      Zithromax [Azithromycin] Diarrhea       FAMILY HISTORY:  Family History   Problem Relation Age of Onset     Breast Cancer Maternal Grandmother      Diabetes Paternal Grandmother      Breast Cancer Other         great grandmother, 2 maternal great aunts - dx 30's, no genetic testing     Ovarian Cancer Other         paternal great aunt     Brain Cancer Mother         glioblastoma     Brain Cancer Maternal Uncle        SOCIAL HISTORY:  Social History     Tobacco Use     Smoking status: Never Smoker     Smokeless tobacco: Never Used   Substance Use Topics     Alcohol use: Yes     Comment: socially     Drug use: No       ROS:  10 point ROS neg other than the symptoms noted above in the HPI.    Labs:  2020: cholesterol 228, HDL 34, , , K 3.9, cr 0.89, hgb 14, plt 300K     Testing/Procedures:  ECHOCARDIOGRAM: 6/15/2020: EF 60-65%, no valve disease     EK/15/2020 at 2:37 pm: sinus tachycardia at 151 bpm     EXTERNAL MONITOR: -2020: sinus, Range  bpm, average 84 bpm; patient triggered event associated with sinus tachycardia    EKGs Reviewed today:  sent via [employer]:  2020: sinus tach 141 bpm  9/3/2020: sinus tach 144 bpm    At [facility] 2/3/2020: sinus 86 bpm    Exam:  The rest of a " comprehensive physical examination is deferred due to public [facility] emergency video visit restrictions.  CONSITUTIONAL: no acute distress  HEENT: no icterus, no redness or discharge, neck supple  CV: no visible edema of visualized extremities. No JVD.   RESPIRATORY: respirations nonlabored, no cough  NEURO: AA&Ox3, speech fluent/appropriate, motor grossly nonfocal  PSYCH: cooperative, affect appropriate  DERM: no rashes on visualized face/neck/upper extremities      Assessment and Plan:   Sinus tachycardia, appears to be related to vagal reactions, no specific triggers. Two episodes in the last 6 months, she believes she missed metoprolol both times because she woke up feeling NOT tired those mornings. She asks if any adjustments can be done to reduce fatigue. Will change metoprolol succinate to 25 mg bid - if she feels less tired without palpitations can further reduce to once a day. Alternative if fatigue persists can switch to verapamil.     She inquired about safety of taking either verapamil or metoprolol if she becomes pregnant. They are both category C for pregnancy, although can be used if benefit outweighs risk. For intermittent symptomatic sinus tachycardia I would advise not to take it during pregnancy unless she has severe symptoms such as frequently syncope. Hopefully pregnancy can be planned and we can gradually taper off meds before pregnancy.      In addition to video time documented above, I spent an additional 15 minutes on data review and documentation.    I have reviewed the note as documented above.  This accurately captures the substance of my virtual visit with the patient. The patient states understanding and is agreeable with the plan.     Rosalinda Thomas MD  Cardiology           Alva is a 30 year old who is being evaluated via a billable video visit.      How would you like to obtain your AVS? MyChart  If the video visit is dropped, the invitation should be resent by:  Pt will connect through  "MyChart  Will anyone else be joining your video visit? No      Vitals - Patient Reported  Weight (Patient Reported): 104.3 kg (230 lb)  Height (Patient Reported): 154.9 cm (5' 1\")  BMI (Based on Pt Reported Ht/Wt): 43.46  SpO2 (Patient Reported): 100  Pulse (Patient Reported): 80  Pain Score: No Pain (0)(No SOB)    Vitals were taken and medication reconciled.    HOSEA Sánchez  9:28 AM        Please do not hesitate to contact me if you have any questions/concerns.     Sincerely,     Rosalinda Thomas MD    "

## 2021-03-02 ENCOUNTER — TELEPHONE (OUTPATIENT)
Dept: DERMATOLOGY | Facility: CLINIC | Age: 31
End: 2021-03-02

## 2021-03-03 ENCOUNTER — VIRTUAL VISIT (OUTPATIENT)
Dept: DERMATOLOGY | Facility: CLINIC | Age: 31
End: 2021-03-03
Payer: COMMERCIAL

## 2021-03-03 DIAGNOSIS — L70.0 ACNE VULGARIS: Primary | ICD-10-CM

## 2021-03-03 PROCEDURE — 99213 OFFICE O/P EST LOW 20 MIN: CPT | Mod: 95 | Performed by: DERMATOLOGY

## 2021-03-03 NOTE — NURSING NOTE
Chief Complaint   Patient presents with     Teledermatology     Teledermatology with photo review.        There were no vitals taken for this visit.    Mirela Olguin CMA  March 3, 2021

## 2021-03-03 NOTE — PROGRESS NOTES
[facility] (Store and Forward ((National Emergency Concerning the CORONAVIRUS (COVID 19), preferred for return patients. )    Image quality and interpretability: acceptable    Physician has received verbal consent for a Video/Photos Visit from the patient? Yes    In-person dermatology visit recommendation: no    Consent has been obtained for this service by 1 care team member: yes.     Teledermatology information:  - Location of patient: Home  - Location of teledermatologist:  ([facility] (Dr. Joseph, [place], [place])  - Reason teledermatology is appropriate:  of National Emergency Regarding Coronavirus disease (COVID 19) Outbreak  - Method of transmission:  Store and Forward ((National Emergency Concerning the CORONAVIRUS (COVID 19), preferred for return patients.   - Date of images: 03/03/21  - Service start time:1025  - Service end time:1030  - Additional time spent on day of service: None  - Date of report: 03/03/21      ___________________________________________________________________________    [facility] (Store and Forward ((National Emergency Concerning the CORONAVIRUS (COVID 19), preferred for return patients. )    Image quality and interpretability: acceptable    Physician has received verbal consent for a Video/Photos Visit from the patient? Yes    In-person dermatology visit recommendation: no    Consent has been obtained for this service by 1 care team member: yes.     Teledermatology information:  - Location of patient: Home  - Location of teledermatologist:  ([facility] (Dr. Joseph, [facility] [place])  - Reason teledermatology is appropriate:  of National Emergency Regarding Coronavirus disease (COVID 19) Outbreak  - Method of transmission:  Store and Forward ((National Emergency Concerning the CORONAVIRUS (COVID 19), preferred for return patients.   - Date of images: 02/03/21  - Service  "start time:0929  - Service end time:0935  - Additional time spent on day of service: None  - Date of report: 02/03/21      ___________________________________________________________________________            Pediatric Dermatology Clinic Note      Alva Joshi  [id number]    CC: Patient presents with:  Teledermatology: Teledermatology with photo review.   f/up    Assessment and Plan:  1. Acne vulgaris:  Acne is inflammatory with post inflammatory pigment changes and scarring. Acne is clinically clear. Will stop isotretinoin. She will need two post treatment pregnancy tests.   -Goal cumulative dose will be 90247-23479 mg.  -Continue isotretinoin at 80 mg daily (last month of treatment)   -Cumulative dose is 20177 mg        RTC prn.     Thank you for involving me in this patient's care.     Johanna Joseph MD  Pediatric Dermatology Staff    CC:     [facility], [facility]    ______________________________________________________________________    HPI:   Alva Joshi is a 30 year old female presenting for reevaluation of acne on isotretinoin.Continues on 80 mg daily.  No new acne lesions.         Past treatments: doxycycline, minocycline, spironolactone, OCP, clindamycin lotion.       Past Medical History:   Diagnosis Date     Endometriosis      Gastro-oesophageal reflux disease      Migraines      Numbness and tingling     right shoulder \"Pins and Needles\"     PONV (postoperative nausea and vomiting)        Social Hx:  MA at [employer]    ROS: Negative for fever, weight loss, change in appetite, bone pain/swelling, vision or hearing problems, cough, nausea, vomiting, diarrhea, or mood changes.    PHYSICAL EXAMINATION:     There were no vitals taken for this visit.  GENERAL:  Well appearing and well nourished, in no acute distress.     NEURO: Normal mentation and speech  PSYCH: Normal mood/affect  EYES:  Clear.  Conjunctivae normal.     SKIN-  Lip xerosis and erythema  Facial xerosis        "

## 2021-03-03 NOTE — PROGRESS NOTES
"Alva who is being evaluated via a billable teledermatology visit.             The patient has been notified of following:            \"We have asked you to send in photos via Mayvennt or e-mail. These photos will be seen and reviewed by an MD or PANeoC.  A telederm visit is not as thorough as an in-person visit, photo assessment does not replace an in-person skin exam.  The quality of the photograph sent may not be of the same quality as that taken by the dermatology clinic. With that being said, we have found that certain health care needs can be provided without the need for a physical exam.  This service lets us provide the care you need with a short phone conversation. If prescriptions are needed we can send directly to your pharmacy.If lab work is needed we can place an order for that and you can then stop by our lab to have the test done at a later time. An MD/PA/Resident will call you around the time of your visit. This may be from a blocked number.     This is a billable visit. If during the course of the call the physician/provider feels a telephone visit is not appropriate, you will not be charged for this service.            Patient has given verbal consent for Telephone visit?  Yes           The patient would like to proceed with an teledermatology because of the COVID Pandemic.     Patient complains of    Accutane follow up       ALLERGIES REVIEWED?  yes  Pediatric Dermatology- Review of Systems Questions (return patient)          Goal for today's visit? Discuss progress     IN THE LAST 2 WEEKS     Fever- no     Mouth/Throat Sores- no/no     Weight Gain/Loss - no/no     Cough/Wheezing- no/no     Change in Appetite- no     Chest Discomfort/Heartburn - on/no     Bone Pain- no     Nausea/Vomiting - no/no     Joint Pain/Swelling - no/no     Constipation/Diarrhea - no/no     Headaches/Dizziness/Change in Vision- no/no/no     Pain with Urination- no     Ear Pain/Hearing Loss- no/no     Nasal " Discharge/Bleeding- no/no     Sadness/Irritability- no/no     Anxiety/Moodiness-no/no       Pediatric Dermatology Clinic - Accutane Questionaire    Dry Lips: Moderate    Dry or Blood Shot Eyes: No    Dry Skin: Mild    Muscle Aches or Pains: No    Nose Bleeds: Yes    Frequent Headaches: No    Mood Swings: No    Depression: No    Suicidal Thoughts: No    Toenail/Fingernail Inflammation: No    Rash: No    Trouble with Night Vision: No    Severe Sun Sensitivity or Sunburn: No    School or Social problems: No    Change in past medical, family or social history: No    I am aware that I should not share medications or donate blood while taking these medications: Yes    Severity of Acne per scale: Clear    Improvement since the beginning of medication use, on the scale: Clear    Survey completed by:  Patient

## 2021-03-03 NOTE — PATIENT INSTRUCTIONS
[facility]- Pediatric Dermatology  Dr. Fatemeh Santillan, Dr. Raymon White, Dr. Johanna Joseph, Janelle Paez, MARIEL Paez, Dr. Minerva Alicea & Dr. Ruy Martinez       Non Urgent  Nurse Triage Line; [phone]- Queenie and Adelaide COLLINS Care Coordinators      Gricelda (/Complex ) [phone]      If you need a prescription refill, please contact your pharmacy. Refills are approved or denied by our Physicians during normal business hours, Monday through Fridays    Per office policy, refills will not be granted if you have not been seen within the past year (or sooner depending on your child's condition)      Scheduling Information:     Pediatric Appointment Scheduling and Call Center [phone]   Radiology Scheduling- [phone]     Sedation Unit Scheduling- [phone]    [place] Scheduling- [facility] [phone]; Pediatric Dermatology [phone]    Main  Services: [phone]   Mohawk: [phone]   Sudanese: [phone]   Hmong/Upper sorbian/Portuguese: [phone]      Preadmission Nursing Department Fax Number: [phone] (Fax all pre-operative paperwork to this number)      For urgent matters arising during evenings, weekends, or holidays that cannot wait for normal business hours please call [phone] and ask for the Dermatology Resident On-Call to be paged.

## 2021-04-07 ASSESSMENT — ANXIETY QUESTIONNAIRES
5. BEING SO RESTLESS THAT IT IS HARD TO SIT STILL: NOT AT ALL
6. BECOMING EASILY ANNOYED OR IRRITABLE: NOT AT ALL
7. FEELING AFRAID AS IF SOMETHING AWFUL MIGHT HAPPEN: NOT AT ALL
GAD7 TOTAL SCORE: 1
1. FEELING NERVOUS, ANXIOUS, OR ON EDGE: NOT AT ALL
2. NOT BEING ABLE TO STOP OR CONTROL WORRYING: NOT AT ALL
7. FEELING AFRAID AS IF SOMETHING AWFUL MIGHT HAPPEN: NOT AT ALL
4. TROUBLE RELAXING: SEVERAL DAYS
3. WORRYING TOO MUCH ABOUT DIFFERENT THINGS: NOT AT ALL
GAD7 TOTAL SCORE: 1

## 2021-04-07 ASSESSMENT — ENCOUNTER SYMPTOMS
DECREASED LIBIDO: 0
HOT FLASHES: 0

## 2021-04-08 ASSESSMENT — ANXIETY QUESTIONNAIRES: GAD7 TOTAL SCORE: 1

## 2021-04-12 NOTE — PROGRESS NOTES
"[facility]  Gynecology Visit    SUBJECTIVE    Alva Joshi is a 30 year old  who is here for an annual pelvic/breast examination. She also requests IUD removal.     Alva reported generally good health in the past year. In 2020, she experienced episodes of tachycardia and heart palpitations, HR in range of 160-180 bpm. Condition is well controlled with metoprolol. She also spontaneously passed a \"kidney stone\" in 2020. Patient is planning to get  in 10/2021 and would like to conceive shortly thereafter. She would like to have her IUD removed and is undecided on follow up contraception in the mean time. Alva mentioned possibly doing 1 depo shot or a couple months of OCPs. She worries about prolonged heavy bleeding once IUD is out.      She has a history of 3 laparoscopies for endometriosis and wonders if she should pursue infertility evaluation now or after trying first.    Her LMP is: Patient's last menstrual period was 2021.      PAST MEDICAL HISTORY  Past Medical History:   Diagnosis Date     Depressive disorder     In high school when i moved from [place] to [place].     Endometriosis      Gastro-oesophageal reflux disease      Kidney stone 2020     Migraines      Numbness and tingling     right shoulder \"Pins and Needles\"     PONV (postoperative nausea and vomiting)        MEDICATIONS  Current Outpatient Medications   Medication     buPROPion (WELLBUTRIN XL) 300 MG 24 hr tablet     levonorgestrel (MIRENA) 20 MCG/24HR IUD     metoprolol succinate ER (TOPROL-XL) 25 MG 24 hr tablet     norethindrone-ethinyl estradiol (JUNEL FE 1/20) 1-20 MG-MCG tablet     No current facility-administered medications for this visit.        ALLERGIES  Allergies   Allergen Reactions     Augmentin Shortness Of Breath     Penicillins      Zithromax [Azithromycin] Diarrhea       OBSTETRIC/GYNECOLOGIC HISTORY  Menarche: 9-11 yrs old   Period cycle/length/flow/associated symptoms: monthly, heavy " "flow 3-4 days, lingers 5-6 days, bad cramps for 2-3 days + fever, control w/ ibuprofen  Current contraception: Mirena IUD  Number of partners in last year: 1  History of STDs: None   Lab Results   Component Value Date    PAP NIL 2018      History of abnormal Pap smear: No    OB History    Para Term  AB Living   0 0 0 0 0 0   SAB TAB Ectopic Multiple Live Births   0 0 0 0 0       PAST SURGICAL HISTORY   Past Surgical History:   Procedure Laterality Date     ARTHROSCOPY SHOULDER Right 2015    Procedure: ARTHROSCOPY SHOULDER;  Surgeon: Rod Leiva MD;  Location: [facility] OR     ENT SURGERY      sinus surgery     ENT SURGERY      deviated septum fixed     INSERT INTRAUTERINE DEVICE  2016    states also had a LSC \"pelvic clean out\" for endometriosis at this time - per op note in care everywhere, minimal endo seen/fulgarized     LAPAROSCOPIC CYSTECTOMY OVARIAN (BENIGN)      ovarian cystectomy, diagnosed with endometriosis     LAPAROSCOPY      had a LSC \"pelvic clean out\" for endometriosis and mirena IUD insertion       SOCIAL HISTORY    Social History     Tobacco Use     Smoking status: Never Smoker     Smokeless tobacco: Never Used   Substance Use Topics     Alcohol use: Yes     Comment: socially     Drug use: No     Social History     Social History Narrative     Not on file       FAMILY HISTORY    Family History   Problem Relation Age of Onset     Breast Cancer Maternal Grandmother      Diabetes Paternal Grandmother      Breast Cancer Other         great grandmother, 2 maternal great aunts - dx 30's, no genetic testing     Ovarian Cancer Other         paternal great aunt     Brain Cancer Mother         glioblastoma     Other Cancer Mother         Glioblastoma     Brain Cancer Maternal Uncle        REVIEW OF SYSTEMS  A 10 point review of systems including Constitutional, Eyes, Respiratory, Cardiovascular, Gastroenterology, Genitourinary, Integumentary, Musculoskeletal, and " Psychiatric, were all negative, except for pertinent positives noted in the above HPI.    OBJECTIVE  /81   Pulse 98   Ht 1.524 m (5')   Wt 111.1 kg (245 lb)   LMP 03/28/2021   BMI 47.85 kg/m      General: Alert, without distress  HEENT: normocephalic, without obvious abnormality; normal thyroid gland   Breast: Within normal limits bilaterally, no nipple discharge, no lymphadenopathy  Cardiovascular: regular rate and rhythm, no murmurs/rubs/gallops   Lungs: clear to auscultation bilaterally   Abdomen: soft, non-tender, non-distended, normal bowel sounds   Pelvic: normal external female genitalia; normal vagina without discharge; normal cervix without lesions/masses; anteverted, mobile, nontender; adnexae nontender and without masses; normal anus/perineum   Extremities: normal    MIRENA IUD REMOVAL PROCEDURE NOTE  I placed a speculum to visualize the cervix. I visualized the black IUD strings and grasped them with a ring forcep. I removed the IUD intact without difficulty. I removed the speculum. Alva tolerated the procedure well.    ASSESSMENT  Alva Joshi is a 30 year old G0 who is here for an annual pelvic/breast examination, and Mirena IUD removal.    PLAN  1. Annual physical  STD testing: declined today   Cervical cancer: last pap 2018 NIL; repeat performed today; repeat again in 2024 if normal   Breast cancer: CBE today normal, repeat annually. Referral to cancer management center for evaluation of risk factors and need for early screening as has grandmother and great-grandmother with early-onset breast cancer.   Last Tdap 2013; repeat 2023   HPV series completed    2. Contraception  - IUD removed today   - Prescribed 1 year supply of Junel for birth control until patient is ready to conceive     3. Pregnancy counseling   - Patient is advised to attempt a successful conception for 6 months before considering hysterosalpingogram, pelvic ultrasound given history of endometriosis   - Advised to  take PNV with folic acid 3 months prior to conception attempts    - Discussed use of Kindara joaquina or other similar period tracking joaquina as well as other methods of fertility awareness    RTC in one year for an annual examination.    Melissa Jeff, MS3    OBGYN Attending Addendum    I appreciate the note above by medical student, Melissa Jeff.  I, Elisabeth Alonso, was present with the medical student, who participated in the service and in the documentation of the note. I have verified the history and personally performed the physical exam and medical decision making. I personally performed the IUD removal. I have edited accordingly and agree with the assessment and plan of care as documented in the note.    Elisabeth Alonso MD, MSCI    [facility]/OBGYN

## 2021-04-16 ENCOUNTER — OFFICE VISIT (OUTPATIENT)
Dept: OBGYN | Facility: CLINIC | Age: 31
End: 2021-04-16
Attending: OBSTETRICS & GYNECOLOGY
Payer: COMMERCIAL

## 2021-04-16 VITALS
BODY MASS INDEX: 48.1 KG/M2 | SYSTOLIC BLOOD PRESSURE: 115 MMHG | DIASTOLIC BLOOD PRESSURE: 81 MMHG | HEIGHT: 60 IN | HEART RATE: 98 BPM | WEIGHT: 245 LBS

## 2021-04-16 DIAGNOSIS — Z30.011 ENCOUNTER FOR INITIAL PRESCRIPTION OF CONTRACEPTIVE PILLS: ICD-10-CM

## 2021-04-16 DIAGNOSIS — Z80.3 FAMILY HISTORY OF MALIGNANT NEOPLASM OF BREAST: ICD-10-CM

## 2021-04-16 DIAGNOSIS — Z00.00 ROUTINE GENERAL MEDICAL EXAMINATION AT A HEALTH CARE FACILITY: Primary | ICD-10-CM

## 2021-04-16 PROCEDURE — 58301 REMOVE INTRAUTERINE DEVICE: CPT | Performed by: OBSTETRICS & GYNECOLOGY

## 2021-04-16 PROCEDURE — G0101 CA SCREEN;PELVIC/BREAST EXAM: HCPCS | Performed by: OBSTETRICS & GYNECOLOGY

## 2021-04-16 PROCEDURE — 58301 REMOVE INTRAUTERINE DEVICE: CPT

## 2021-04-16 PROCEDURE — G0145 SCR C/V CYTO,THINLAYER,RESCR: HCPCS | Performed by: OBSTETRICS & GYNECOLOGY

## 2021-04-16 RX ORDER — NORETHINDRONE ACETATE AND ETHINYL ESTRADIOL 1MG-20(21)
1 KIT ORAL DAILY
Qty: 84 TABLET | Refills: 4 | Status: SHIPPED | OUTPATIENT
Start: 2021-04-16 | End: 2022-04-25

## 2021-04-16 ASSESSMENT — PATIENT HEALTH QUESTIONNAIRE - PHQ9: SUM OF ALL RESPONSES TO PHQ QUESTIONS 1-9: 0

## 2021-04-16 ASSESSMENT — MIFFLIN-ST. JEOR: SCORE: 1757.81

## 2021-04-16 ASSESSMENT — PAIN SCALES - GENERAL: PAINLEVEL: NO PAIN (0)

## 2021-04-16 NOTE — PATIENT INSTRUCTIONS
So nice to meet you today!    Jonathan is one period tracking joaquina that I like to track your fertile window.    I like nature made prenatal vitamins, but anything with folic acid 400IU daily is appropriate.    Methods of Fertility Awareness    Methods of fertility awareness help you recognize times when you are most likely to become pregnant. The benefits to these methods are that they do not require devices or medication and that you can use them to become pregnant or to avoid pregnancy. When used to avoid pregnancy, between 5 and 25 out of 100 women who use these methods will become pregnant within 1 year.  These methods can become difficult to use if you have irregular periods. If you and your partner do not wish to abstain during your fertile days, you can always use a barrier method to prevent pregnancy if you have sex. It is important to remember that these methods do not prevent sexually transmitted infections.  The Menstrual Cycle  The menstrual cycle is the time from the first day of your period (called day 1) to the first day of your next period. Most women have a menstrual cycle that is about 28 days. The menstrual cycle is caused by the ovary preparing to release an egg, called ovulation. The egg is usually released into the fallopian tube in the middle of the menstrual cycle (around day 14). If sperm are present in the fallopian tube when the egg is released, the egg will become fertilized and you become pregnant. This can happen if you have sex anywhere from 5 days before until 1 day after ovulation. If you do not have sex around the time of ovulation, the egg does not become fertilized and you have another period.  Methods of fertility awareness help you figure out when you are ovulating. You can either avoid having sex or use a barrier method like condoms when you are ovulating to prevent pregnancy or have unprotected sex to become pregnant.  Method 1: Standard Days Method  If your menstrual cycle (the  number of days between the first day of one period to the first day of the next period) is between 26 and 32 days, days 8-19 (when the first day of bleeding is counted as day 1) are your most fertile days. Avoiding sex during those days or using a barrier method will prevent pregnancy.  Method 2: Body Temperature  For this method, you need a basal body thermometer. Your baseline, or basal body temperature, is the temperature when you first wake up in the morning. It is usually between 96 and 98 degrees. Your basal body temperature increases just a little bit, about   of a degree, after you ovulate. If you take your temperature every morning and write it down on a calendar, you can determine when you have ovulated because of the increase in your temperature. Remember, if you have unprotected sex in the week before through 2 days after you ovulate, you can become pregnant.  Method 3: Cervical Mucus  The cervix is the bottom of the uterus or womb. The cervix makes mucus which changes in how it looks and feels during your menstrual cycle. Just after your period ends, your cervix does not make much mucus. During this time, you are not likely to get pregnant. As the egg gets ready, your cervix produces more mucus. It looks cloudy and feels sticky. Once you start to produce cervical mucus, you are entering your fertile window. Having sex when you have cervical mucus means that you could become pregnant. Just before ovulation, the cervix produces lots of clear, slippery mucus. This signals the time you are most likely to become pregnant if you have sex. After you ovulate, your cervical mucus will become sticky and cloudy again, and then stop. You are less likely to get pregnant if you have sex during this time.  You can check your cervical mucus by wiping the opening of the vagina with tissue before you urinate, or by putting two clean fingers into the vagina and then looking at the mucus.  The easiest way to use cervical  "mucus to avoid pregnancy is to ask yourself, \"Do I have cervical mucus today? Did I have cervical mucus yesterday?\" If you can answer no to both questions, it is a safe day to have unprotected sex.    More Good Resources:  http://bedsider.org/methods/fertility_awareness#how_to_tab  https://www.plannedpar\[facility]\"".org/learn/birth-control/fertility-awareness  http://nfp.[place].[facility]/  "

## 2021-04-16 NOTE — LETTER
"2021       RE: Alva Joshi  [address] [place] [address]     Dear Colleague,    Thank you for referring your patient, Alva Joshi, to the [facility] [place] at [facility]. Please see a copy of my visit note below.    [facility]  Gynecology Visit    SUBJECTIVE    Alva Joshi is a 30 year old  who is here for an annual pelvic/breast examination. She also requests IUD removal.     Alva reported generally good health in the past year. In 2020, she experienced episodes of tachycardia and heart palpitations, HR in range of 160-180 bpm. Condition is well controlled with metoprolol. She also spontaneously passed a \"kidney stone\" in 2020. Patient is planning to get  in 10/2021 and would like to conceive shortly thereafter. She would like to have her IUD removed and is undecided on follow up contraception in the mean time. Alva mentioned possibly doing 1 depo shot or a couple months of OCPs. She worries about prolonged heavy bleeding once IUD is out.      She has a history of 3 laparoscopies for endometriosis and wonders if she should pursue infertility evaluation now or after trying first.    Her LMP is: Patient's last menstrual period was 2021.      PAST MEDICAL HISTORY  Past Medical History:   Diagnosis Date     Depressive disorder     In high school when i moved from [place] to [place].     Endometriosis      Gastro-oesophageal reflux disease      Kidney stone 2020     Migraines      Numbness and tingling     right shoulder \"Pins and Needles\"     PONV (postoperative nausea and vomiting)        MEDICATIONS  Current Outpatient Medications   Medication     buPROPion (WELLBUTRIN XL) 300 MG 24 hr tablet     levonorgestrel (MIRENA) 20 MCG/24HR IUD     metoprolol succinate ER (TOPROL-XL) 25 MG 24 hr tablet     norethindrone-ethinyl estradiol (JUNEL ) 1-20 MG-MCG tablet     No " "current facility-administered medications for this visit.        ALLERGIES  Allergies   Allergen Reactions     Augmentin Shortness Of Breath     Penicillins      Zithromax [Azithromycin] Diarrhea       OBSTETRIC/GYNECOLOGIC HISTORY  Menarche: 9-11 yrs old   Period cycle/length/flow/associated symptoms: monthly, heavy flow 3-4 days, lingers 5-6 days, bad cramps for 2-3 days + fever, control w/ ibuprofen  Current contraception: Mirena IUD  Number of partners in last year: 1  History of STDs: None   Lab Results   Component Value Date    PAP NIL 2018      History of abnormal Pap smear: No    OB History    Para Term  AB Living   0 0 0 0 0 0   SAB TAB Ectopic Multiple Live Births   0 0 0 0 0       PAST SURGICAL HISTORY   Past Surgical History:   Procedure Laterality Date     ARTHROSCOPY SHOULDER Right 2015    Procedure: ARTHROSCOPY SHOULDER;  Surgeon: Rod Leiva MD;  [facility]: [facility]     ENT SURGERY      sinus surgery     ENT SURGERY      deviated septum fixed     INSERT INTRAUTERINE DEVICE  2016    states also had a LSC \"pelvic clean out\" for endometriosis at this time - per op note in care everywhere, minimal endo seen/fulgarized     LAPAROSCOPIC CYSTECTOMY OVARIAN (BENIGN)      ovarian cystectomy, diagnosed with endometriosis     LAPAROSCOPY      had a LSC \"pelvic clean out\" for endometriosis and mirena IUD insertion       SOCIAL HISTORY    Social History     Tobacco Use     Smoking status: Never Smoker     Smokeless tobacco: Never Used   Substance Use Topics     Alcohol use: Yes     Comment: socially     Drug use: No     Social History     Social History Narrative     Not on file       FAMILY HISTORY    Family History   Problem Relation Age of Onset     Breast Cancer Maternal Grandmother      Diabetes Paternal Grandmother      Breast Cancer Other         great grandmother, 2 maternal great aunts - dx 30's, no genetic testing     Ovarian Cancer Other         paternal " great aunt     Brain Cancer Mother         glioblastoma     Other Cancer Mother         Glioblastoma     Brain Cancer Maternal Uncle        REVIEW OF SYSTEMS  A 10 point review of systems including Constitutional, Eyes, Respiratory, Cardiovascular, Gastroenterology, Genitourinary, Integumentary, Musculoskeletal, and Psychiatric, were all negative, except for pertinent positives noted in the above HPI.    OBJECTIVE  /81   Pulse 98   Ht 1.524 m (5')   Wt 111.1 kg (245 lb)   LMP 03/28/2021   BMI 47.85 kg/m      General: Alert, without distress  HEENT: normocephalic, without obvious abnormality; normal thyroid gland   Breast: Within normal limits bilaterally, no nipple discharge, no lymphadenopathy  Cardiovascular: regular rate and rhythm, no murmurs/rubs/gallops   Lungs: clear to auscultation bilaterally   Abdomen: soft, non-tender, non-distended, normal bowel sounds   Pelvic: normal external female genitalia; normal vagina without discharge; normal cervix without lesions/masses; anteverted, mobile, nontender; adnexae nontender and without masses; normal anus/perineum   Extremities: normal    MIRENA IUD REMOVAL PROCEDURE NOTE  I placed a speculum to visualize the cervix. I visualized the black IUD strings and grasped them with a ring forcep. I removed the IUD intact without difficulty. I removed the speculum. Alva tolerated the procedure well.    ASSESSMENT  Alva Joshi is a 30 year old G0 who is here for an annual pelvic/breast examination, and Mirena IUD removal.    PLAN  1. Annual physical  STD testing: declined today   Cervical cancer: last pap 2018 NIL; repeat performed today; repeat again in 2024 if normal   Breast cancer: CBE today normal, repeat annually. Referral to cancer management center for evaluation of risk factors and need for early screening as has grandmother and great-grandmother with early-onset breast cancer.   Last Tdap 2013; repeat 2023   HPV series completed    2.  Contraception  - IUD removed today   - Prescribed 1 year supply of Junel for birth control until patient is ready to conceive     3. Pregnancy counseling   - Patient is advised to attempt a successful conception for 6 months before considering hysterosalpingogram, pelvic ultrasound given history of endometriosis   - Advised to take PNV with folic acid 3 months prior to conception attempts    - Discussed use of Kindara joaquina or other similar period tracking joaquina as well as other methods of fertility awareness    RTC in one year for an annual examination.    Melissa Jeff, MS3    OBGYN Attending Addendum    I appreciate the note above by medical student, Melissa Jeff.  I, Elisabeth Alonso, was present with the medical student, who participated in the service and in the documentation of the note. I have verified the history and personally performed the physical exam and medical decision making. I have edited accordingly and agree with the assessment and plan of care as documented in the note.    Elisabeth Alonso MD, MSCI    [facility]/OBGYN

## 2021-04-21 LAB
COPATH REPORT: NORMAL
PAP: NORMAL

## 2021-05-14 ENCOUNTER — DOCUMENTATION ONLY (OUTPATIENT)
Dept: ONCOLOGY | Facility: CLINIC | Age: 31
End: 2021-05-14

## 2021-05-14 NOTE — PROGRESS NOTES
5/14/21    Alva was scheduled for a genetic counseling video visit with me today. I sent her links to join the video by text message and email. I also attempted to reach her by phone twice. I was unable to reach her so I left a message with my contact information and the phone number for the scheduling team if she wishes to reschedule her appointment.     Shawnee Garzon MS, [facility]  Licensed, Certified Genetic Counselor  Office: [phone]  Email: [email or web address]

## 2021-06-01 DIAGNOSIS — R00.0 SINUS TACHYCARDIA: ICD-10-CM

## 2021-06-03 RX ORDER — METOPROLOL SUCCINATE 25 MG/1
TABLET, EXTENDED RELEASE ORAL
Qty: 180 TABLET | Refills: 1 | OUTPATIENT
Start: 2021-06-03

## 2021-06-08 ENCOUNTER — E-VISIT (OUTPATIENT)
Dept: URGENT CARE | Facility: URGENT CARE | Age: 31
End: 2021-06-08
Payer: COMMERCIAL

## 2021-06-08 DIAGNOSIS — R05.9 COUGH: ICD-10-CM

## 2021-06-08 DIAGNOSIS — R05.9 COUGH: Primary | ICD-10-CM

## 2021-06-08 LAB
LABORATORY COMMENT REPORT: NORMAL
SARS-COV-2 RNA RESP QL NAA+PROBE: NEGATIVE
SARS-COV-2 RNA RESP QL NAA+PROBE: NORMAL
SPECIMEN SOURCE: NORMAL
SPECIMEN SOURCE: NORMAL

## 2021-06-08 PROCEDURE — U0003 INFECTIOUS AGENT DETECTION BY NUCLEIC ACID (DNA OR RNA); SEVERE ACUTE RESPIRATORY SYNDROME CORONAVIRUS 2 (SARS-COV-2) (CORONAVIRUS DISEASE [COVID-19]), AMPLIFIED PROBE TECHNIQUE, MAKING USE OF HIGH THROUGHPUT TECHNOLOGIES AS DESCRIBED BY CMS-2020-01-R: HCPCS | Performed by: NURSE PRACTITIONER

## 2021-06-08 PROCEDURE — 99421 OL DIG E/M SVC 5-10 MIN: CPT | Performed by: NURSE PRACTITIONER

## 2021-06-08 PROCEDURE — U0005 INFEC AGEN DETEC AMPLI PROBE: HCPCS | Performed by: NURSE PRACTITIONER

## 2021-06-08 NOTE — PATIENT INSTRUCTIONS
"  Dear Alva Joshi    After reviewing your responses, I've been able to diagnose you with \"Bronchitis\" which is a common infection of your lungs that is nearly always caused by a virus. The virus causes swelling and irritation of the air passages of your lungs which leads to cough. The illness spreads from your nose and throat to your windpipe and airways. It is often called a \"chest cold\" and can last up to 2 weeks, but is not a serious illness. Exposure to cigarette smoke usually makes this significantly worse.      To treat bronchitis, the main thing to do is drink lots of fluids and rest. Cough medications over-the-counter such as mucinex, robitussin or \"cold and sinus\" medications can be helpful. Ibuprofen and Tylenol also help with fevers or aching feelings that you often have with this kind of illness. Do not take ibuprofen if you have kidney disease, stomach ulcers or allergy to aspirin.     Bronchitis is most often highly contagious as viruses are spread through the air or touch. Avoid contact with others who may become infected, particularly children, the elderly and those whose immune systems might be weak.     If your symptoms worsen, you develop chest pain or shortness of breath, fevers over 101, or are not improving in 5 days, please contact your primary care provider for an appointment or visit any of our convenient Walk-in Care or Urgent Care Centers to be seen which can be found on our website here.    Thanks again for choosing us as your health care partner,    KENDRICK Mariee CNP  "

## 2021-06-10 ENCOUNTER — VIRTUAL VISIT (OUTPATIENT)
Dept: FAMILY MEDICINE | Facility: CLINIC | Age: 31
End: 2021-06-10
Payer: COMMERCIAL

## 2021-06-10 ENCOUNTER — ALLIED HEALTH/NURSE VISIT (OUTPATIENT)
Dept: NURSING | Facility: CLINIC | Age: 31
End: 2021-06-10
Payer: COMMERCIAL

## 2021-06-10 ENCOUNTER — NURSE TRIAGE (OUTPATIENT)
Dept: FAMILY MEDICINE | Facility: CLINIC | Age: 31
End: 2021-06-10

## 2021-06-10 ENCOUNTER — APPOINTMENT (OUTPATIENT)
Dept: LAB | Facility: CLINIC | Age: 31
End: 2021-06-10
Payer: COMMERCIAL

## 2021-06-10 DIAGNOSIS — J06.9 VIRAL URI WITH COUGH: ICD-10-CM

## 2021-06-10 DIAGNOSIS — F33.1 MODERATE EPISODE OF RECURRENT MAJOR DEPRESSIVE DISORDER (H): ICD-10-CM

## 2021-06-10 DIAGNOSIS — E66.01 MORBID OBESITY (H): ICD-10-CM

## 2021-06-10 DIAGNOSIS — J02.9 ACUTE PHARYNGITIS, UNSPECIFIED ETIOLOGY: ICD-10-CM

## 2021-06-10 DIAGNOSIS — J06.9 VIRAL URI WITH COUGH: Primary | ICD-10-CM

## 2021-06-10 LAB
FLUAV RNA RESP QL NAA+PROBE: NEGATIVE
FLUBV RNA RESP QL NAA+PROBE: NEGATIVE
RSV RNA SPEC NAA+PROBE: NEGATIVE
SARS-COV-2 RNA RESP QL NAA+PROBE: NORMAL
SPECIMEN SOURCE: NORMAL
SPECIMEN SOURCE: NORMAL

## 2021-06-10 PROCEDURE — U0003 INFECTIOUS AGENT DETECTION BY NUCLEIC ACID (DNA OR RNA); SEVERE ACUTE RESPIRATORY SYNDROME CORONAVIRUS 2 (SARS-COV-2) (CORONAVIRUS DISEASE [COVID-19]), AMPLIFIED PROBE TECHNIQUE, MAKING USE OF HIGH THROUGHPUT TECHNOLOGIES AS DESCRIBED BY CMS-2020-01-R: HCPCS | Performed by: INTERNAL MEDICINE

## 2021-06-10 PROCEDURE — U0005 INFEC AGEN DETEC AMPLI PROBE: HCPCS | Performed by: INTERNAL MEDICINE

## 2021-06-10 PROCEDURE — 87804 INFLUENZA ASSAY W/OPTIC: CPT | Performed by: INTERNAL MEDICINE

## 2021-06-10 PROCEDURE — 87631 RESP VIRUS 3-5 TARGETS: CPT | Performed by: INTERNAL MEDICINE

## 2021-06-10 PROCEDURE — 99203 OFFICE O/P NEW LOW 30 MIN: CPT | Mod: 95 | Performed by: INTERNAL MEDICINE

## 2021-06-10 RX ORDER — ACETAMINOPHEN 500 MG
500-1000 TABLET ORAL EVERY 6 HOURS PRN
Qty: 30 TABLET | Refills: 1 | Status: SHIPPED | OUTPATIENT
Start: 2021-06-10 | End: 2024-01-02

## 2021-06-10 RX ORDER — MENTHOL/CETYLPYRD CL 4.5 MG
LOZENGE MUCOUS MEMBRANE
Qty: 30 LOZENGE | Refills: 0 | Status: SHIPPED | OUTPATIENT
Start: 2021-06-10 | End: 2024-01-02

## 2021-06-10 RX ORDER — FLUTICASONE PROPIONATE 50 MCG
1 SPRAY, SUSPENSION (ML) NASAL DAILY
Qty: 18.2 ML | Refills: 0 | Status: SHIPPED | OUTPATIENT
Start: 2021-06-10 | End: 2024-01-02

## 2021-06-10 NOTE — TELEPHONE ENCOUNTER
"Cole message:   Thank you so much. I did however wake up this morning and lost my sense of taste and smell. I tested negative for covid on Tuesday, but I am feeling worse today and now not able to smell or taste anything. Should I get tested again? Or just wait it out?    Triage:  Pt is an RN at [employer]. Is exposed to potentially covid positive patients at work but none specifically that she knows of. Pt has no breaches in PPE, wears mask and face shield with all patient contact.      Pt was seen for an evisit on 6-8-21 and dx with bronchitis. Pt was feeling a bit better Tuesday and Wednesday but woke up this morning with a fever 101 and loss of taste and smell and continued persistent cough that is keeping her up at night.      Pt had covid test on Tuesday 6-8-21 which came back negative.      Pt had both Covid vaccines in January.     Pt denies SOB, chest pain or difficulty breathing. Pt report fever, chills, body aches and loss of taste and smell this AM.    Scheduled pt for virtual visit today per protocol.   Reason for Disposition    [1] Symptoms of COVID-19 (e.g., cough, fever, SOB, or others) AND [2] within 14 days of EXPOSURE (close contact) with diagnosed or suspected COVID-19 patient    [1] COVID-19 infection suspected by caller or triager AND [2] mild symptoms (cough, fever, or others) AND [3] no complications or SOB    Answer Assessment - Initial Assessment Questions  1. COVID-19 CLOSE CONTACT: \"Who is the person with the confirmed or suspected COVID-19 infection that you were exposed to?\"      Work at [employer]; no breaks in PPE   2. PLACE of CONTACT: \"Where were you when you were exposed to COVID-19?\" (e.g., home, school, medical waiting room; which city?)      Last time she felt normal was Sunday 6-6-21  3. TYPE of CONTACT: \"How much contact was there?\" (e.g., sitting next to, live in same house, work in same office, same building)      Contact with patients. " "  4. DURATION of CONTACT: \"How long were you in contact with the COVID-19 patient?\" (e.g., a few seconds, passed by person, a few minutes, 15 minutes or longer, live with the patient)      Contact with patients  5. MASK: \"Were you wearing a mask?\" \"Was the other person wearing a mask?\" Note: wearing a mask reduces the risk of an otherwise close contact.      Masks and face shield  6. DATE of CONTACT: \"When did you have contact with a COVID-19 patient?\" (e.g., how many days ago)      Last worked on Friday 6-3-21  7. COMMUNITY SPREAD: \"Are there lots of cases of COVID-19 (community spread) where you live?\" (See public health department website, if unsure)        No  8. SYMPTOMS: \"Do you have any symptoms?\" (e.g., fever, cough, breathing difficulty, loss of taste or smell)      Fever, cough, loss of taste and smell today, body aches.   9. PREGNANCY OR POSTPARTUM: \"Is there any chance you are pregnant?\" \"When was your last menstrual period?\" \"Did you deliver in the last 2 weeks?\"      No  10. HIGH RISK: \"Do you have any heart or lung problems?\" \"Do you have a weak immune system?\" (e.g., heart failure, COPD, asthma, HIV positive, chemotherapy, renal failure, diabetes mellitus, sickle cell anemia, obesity)        no  11. TRAVEL: \"Have you traveled out of the country recently?\" If so, \"When and where?\" Also ask about out-of-state travel, since the [facility] has identified some high-risk cities for community spread in the . Note: Travel becomes less relevant if there is widespread community transmission where the patient lives.        no    Protocols used: CORONAVIRUS (COVID-19) EXPOSURE-A- 3.25, CORONAVIRUS (COVID-19) DIAGNOSED OR [id number]-T-OR 3.25    Chen Madrid RN      "

## 2021-06-10 NOTE — PROGRESS NOTES
Alva is a 30 year old who is being evaluated via a billable video visit.      How would you like to obtain your AVS? MyChart  If the video visit is dropped, the invitation should be resent by: Text to cell phone: [phone]   Will anyone else be joining your video visit? No    Video Start Time: Start: 06/10/2021 02:38 pm    Assessment and Plan  1. Viral URI with cough  - Influenza A/B antigen; Future  - Symptomatic COVID-19 Virus (Coronavirus) by PCR; Future  - Streptococcus A Rapid Scr w Reflx to PCR  - acetaminophen (TYLENOL) 500 MG tablet; Take 1-2 tablets (500-1,000 mg) by mouth every 6 hours as needed for mild pain  Dispense: 30 tablet; Refill: 1  - fluticasone (FLONASE) 50 MCG/ACT nasal spray; Spray 1 spray into both nostrils daily  Dispense: 18.2 mL; Refill: 0  - Menthol (CEPACOL SORE THROAT) 5.4 MG LOZG; Take 1 tab every 2 hours for 5-7 days  Dispense: 30 lozenge; Refill: 0    2. Moderate episode of recurrent major depressive disorder (H)  3. Morbid obesity (H)  Stable, will monitor.     4. Acute pharyngitis, unspecified etiology  - azithromycin (ZITHROMAX) 250 MG tablet; Take 2 tablets (500 mg) by mouth daily for 1 day, THEN 1 tablet (250 mg) daily for 4 days.  Dispense: 6 tablet; Refill: 0     Patient Instructions     As discussed , your symptoms appear as Viral URI with post nasal drip. Please take copious amounts of fluids and Tylenol as needed 3-4x/day for symptoms relief. Have placed the lab orders as per CDC will need recheck COVID after 48 hrs for making sure its not false negative since you are in healthcare .     ==========================    Patient Education     Viral Upper Respiratory Illness (Adult)    You have a viral upper respiratory illness (URI), which is another term for the common cold. This illness is contagious during the first few days. It is spread through the air by coughing and sneezing. It may also be spread by direct contact (touching the sick person and then touching your  own eyes, nose, or mouth). Frequent handwashing will decrease risk of spread. Most viral illnesses go away within 7 to 10 days with rest and simple home remedies. Sometimes the illness may last for several weeks. Antibiotics will not kill a virus, and they are generally not prescribed for this condition.  Home care    If symptoms are severe, rest at home for the first 2 to 3 days. When you resume activity, don't let yourself get too tired.    Don't smoke. If you need help stopping, talk with your healthcare provider.    Avoid being exposed to cigarette smoke (yours or others ).    You may use acetaminophen or ibuprofen to control pain and fever, unless another medicine was prescribed. If you have chronic liver or kidney disease, have ever had a stomach ulcer or gastrointestinal bleeding, or are taking blood-thinning medicines, talk with your healthcare provider before using these medicines. Aspirin should never be given to anyone under 18 years of age who is ill with a viral infection or fever. It may cause severe liver or brain damage.    Your appetite may be poor, so a light diet is fine. Stay well hydrated by drinking 6 to 8 glasses of fluids per day (water, soft drinks, juices, tea, or soup). Extra fluids will help loosen secretions in the nose and lungs.    Over-the-counter cold medicines will not shorten the length of time you re sick, but they may be helpful for the following symptoms: cough, sore throat, and nasal and sinus congestion. If you take prescription medicines, ask your healthcare provider or pharmacist which over-the-counter medicines are safe to use. (Note: Don't use decongestants if you have high blood pressure.)  Follow-up care  Follow up with your healthcare provider, or as advised.  When to seek medical advice  Call your healthcare provider right away if any of these occur:    Cough with lots of colored sputum (mucus)    Severe headache; face, neck, or ear pain    Difficulty swallowing due to  throat pain    Fever of 100.4 F (38 C) or higher, or as directed by your healthcare provider  Call 911  Call 911 if any of these occur:    Chest pain, shortness of breath, wheezing, or difficulty breathing    Coughing up blood    Very severe pain with swallowing, especially if it goes along with a muffled voice   [employer] last reviewed this educational content on 6/1/2018 2000-2021 [employer]. All rights reserved. This information is not intended as a substitute for professional medical care. Always follow your healthcare professional's instructions.                     Return in about 3 months (around 9/10/2021), or if symptoms worsen or fail to improve, for Follow up of last visit.    Park Watson MD  [facility]ics[facility] Calle is a 30 year old who presents for the following health issues       HPI       Concern for COVID-19  About how many days ago did these symptoms start? 06/07/2021, patient had a negative covid-19 test on 06/08/21, but woke up today, and had no taste or smell.  Is this your first visit for this illness? No   How would you describe your symptoms since your last visit? My symptoms have worsened  In the 14 days before your symptoms started, have you had close contact with someone with COVID-19 (Coronavirus)? Unsure, patient works in Healthcare  Do you have a fever or chills? Yes, I felt feverish or had chills  Are you having new or worsening difficulty breathing? No  Do you have new or worsening cough? Yes, it's a dry cough.   Have you had any new or unexplained body aches? YES    Have you experienced any of the following NEW symptoms?    Headache: YES    Sore throat: previously had, but went away yesterday.    Loss of taste or smell: YES    Chest pain: No    Diarrhea: No    Rash: No  What treatments have you tried? Tylenol, Ibuprofen, Nyquil  Who do you live with? 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 No  Do you have a way to get food/medications if quarantined? Yes, I have a friend or family member who can help me.             Allergies   Allergen Reactions     Augmentin Shortness Of Breath     Penicillins      Zithromax [Azithromycin] Diarrhea        Past Medical History:   Diagnosis Date     Depressive disorder     In high school when i moved from [place] to [place].     Endometriosis      Gastro-oesophageal reflux disease      Kidney stone 06/2020     Migraines      Numbness and tingling     right shoulder \"Pins and Needles\"     PONV (postoperative nausea and vomiting)        Past Surgical History:   Procedure Laterality Date     ARTHROSCOPY SHOULDER Right 9/2/2015    Procedure: ARTHROSCOPY SHOULDER;  Surgeon: Rod Leiva MD;  [facility]     ENT SURGERY      sinus surgery     ENT SURGERY      deviated septum fixed     INSERT INTRAUTERINE DEVICE  12/19/2016    states also had a LSC \"pelvic clean out\" for endometriosis at this time - per op note in care everywhere, minimal endo seen/fulgarized     LAPAROSCOPIC CYSTECTOMY OVARIAN (BENIGN)  2009    ovarian cystectomy, diagnosed with endometriosis     LAPAROSCOPY  2012    had a LSC \"pelvic clean out\" for endometriosis and mirena IUD insertion     ORTHOPEDIC SURGERY         Family History   Problem Relation Age of Onset     Breast Cancer Maternal Grandmother      Diabetes Paternal Grandmother      Breast Cancer Other         great grandmother, 2 maternal great aunts - dx 30's, no genetic testing     Ovarian Cancer Other         paternal great aunt     Brain Cancer Mother         glioblastoma     Other Cancer Mother         Glioblastoma     Brain Cancer Maternal Uncle        Social History     Tobacco Use     Smoking status: Never Smoker     Smokeless tobacco: Never Used   Substance Use Topics     Alcohol use: Yes     Comment: socially        Current Outpatient Medications "   Medication     acetaminophen (TYLENOL) 500 MG tablet     benzonatate (TESSALON) 100 MG capsule     buPROPion (WELLBUTRIN XL) 300 MG 24 hr tablet     fluticasone (FLONASE) 50 MCG/ACT nasal spray     Menthol (CEPACOL SORE THROAT) 5.4 MG LOZG     metoprolol succinate ER (TOPROL-XL) 25 MG 24 hr tablet     norethindrone-ethinyl estradiol (JUNEL FE 1/20) 1-20 MG-MCG tablet     No current facility-administered medications for this visit.         Review of Systems   Constitutional, HEENT, cardiovascular, pulmonary, GI, , musculoskeletal, neuro, skin, endocrine and psych systems are negative, except as otherwise noted.      Objective           Vitals:  No vitals were obtained today due to virtual visit.    Physical Exam   GENERAL: Healthy, alert and no distress  EYES: Eyes grossly normal to inspection.  No discharge or erythema, or obvious scleral/conjunctival abnormalities.  RESP: No audible wheeze, cough, or visible cyanosis.  No visible retractions or increased work of breathing.    SKIN: Visible skin clear. No significant rash, abnormal pigmentation or lesions.  NEURO: Cranial nerves grossly intact.  Mentation and speech appropriate for age.  PSYCH: Mentation appears normal, affect normal/bright, judgement and insight intact, normal speech and appearance well-groomed.    Labs and imaging reviewed and discussed with the patient.      Video-Visit Details    Type of service:  Video Visit    Video End Time:Stop: 06/10/2021 02:48 pm    Originating Location (pt. Location): Home    Distant Location (provider location):  [facility]     Platform used for Video Visit: [employer]

## 2021-06-10 NOTE — PATIENT INSTRUCTIONS
As discussed , your symptoms appear as Viral URI with post nasal drip. Please take copious amounts of fluids and Tylenol as needed 3-4x/day for symptoms relief. Have placed the lab orders as per CDC will need recheck COVID after 48 hrs for making sure its not false negative since you are in healthcare .     ==========================    Patient Education     Viral Upper Respiratory Illness (Adult)    You have a viral upper respiratory illness (URI), which is another term for the common cold. This illness is contagious during the first few days. It is spread through the air by coughing and sneezing. It may also be spread by direct contact (touching the sick person and then touching your own eyes, nose, or mouth). Frequent handwashing will decrease risk of spread. Most viral illnesses go away within 7 to 10 days with rest and simple home remedies. Sometimes the illness may last for several weeks. Antibiotics will not kill a virus, and they are generally not prescribed for this condition.  Home care    If symptoms are severe, rest at home for the first 2 to 3 days. When you resume activity, don't let yourself get too tired.    Don't smoke. If you need help stopping, talk with your healthcare provider.    Avoid being exposed to cigarette smoke (yours or others ).    You may use acetaminophen or ibuprofen to control pain and fever, unless another medicine was prescribed. If you have chronic liver or kidney disease, have ever had a stomach ulcer or gastrointestinal bleeding, or are taking blood-thinning medicines, talk with your healthcare provider before using these medicines. Aspirin should never be given to anyone under 18 years of age who is ill with a viral infection or fever. It may cause severe liver or brain damage.    Your appetite may be poor, so a light diet is fine. Stay well hydrated by drinking 6 to 8 glasses of fluids per day (water, soft drinks, juices, tea, or soup). Extra fluids will help loosen  secretions in the nose and lungs.    Over-the-counter cold medicines will not shorten the length of time you re sick, but they may be helpful for the following symptoms: cough, sore throat, and nasal and sinus congestion. If you take prescription medicines, ask your healthcare provider or pharmacist which over-the-counter medicines are safe to use. (Note: Don't use decongestants if you have high blood pressure.)  Follow-up care  Follow up with your healthcare provider, or as advised.  When to seek medical advice  Call your healthcare provider right away if any of these occur:    Cough with lots of colored sputum (mucus)    Severe headache; face, neck, or ear pain    Difficulty swallowing due to throat pain    Fever of 100.4 F (38 C) or higher, or as directed by your healthcare provider  Call 911  Call 911 if any of these occur:    Chest pain, shortness of breath, wheezing, or difficulty breathing    Coughing up blood    Very severe pain with swallowing, especially if it goes along with a muffled voice   Olena last reviewed this educational content on 6/1/2018 2000-2021 [employer]. All rights reserved. This information is not intended as a substitute for professional medical care. Always follow your healthcare professional's instructions.

## 2021-06-11 ENCOUNTER — TELEPHONE (OUTPATIENT)
Dept: FAMILY MEDICINE | Facility: CLINIC | Age: 31
End: 2021-06-11

## 2021-06-11 LAB
LABORATORY COMMENT REPORT: NORMAL
SARS-COV-2 RNA RESP QL NAA+PROBE: NEGATIVE
SPECIMEN SOURCE: NORMAL

## 2021-06-11 RX ORDER — AZITHROMYCIN 250 MG/1
TABLET, FILM COATED ORAL
Qty: 6 TABLET | Refills: 0 | Status: SHIPPED | OUTPATIENT
Start: 2021-06-11 | End: 2021-06-16

## 2021-06-11 NOTE — TELEPHONE ENCOUNTER
Result message given from Dr. Watson. Covid result available and came back NEGATIVE; relayed to pt as well.     Pt states she is feeling much better today and will  z-pack from pharmacy today.  Pt verbalized understanding, all questions answered.     Routing to Dr. Watson as WILIAN.     Chen Madrid RN

## 2021-06-11 NOTE — TELEPHONE ENCOUNTER
----- Message from Park Watson MD sent at 6/11/2021 12:03 AM CDT -----  All your labs are normal, there might be some highlighted which doesn't have any clinical significance. Still await for COVID test results. I have sent in Z juni to your pharmacy as discussed.     Park Watson MD  Internal medicine physician  [facility]

## 2021-07-07 DIAGNOSIS — F33.1 MODERATE EPISODE OF RECURRENT MAJOR DEPRESSIVE DISORDER (H): ICD-10-CM

## 2021-07-08 RX ORDER — BUPROPION HYDROCHLORIDE 300 MG/1
TABLET ORAL
Qty: 90 TABLET | Refills: 0 | Status: SHIPPED | OUTPATIENT
Start: 2021-07-08 | End: 2021-09-09

## 2021-09-07 DIAGNOSIS — F33.1 MODERATE EPISODE OF RECURRENT MAJOR DEPRESSIVE DISORDER (H): ICD-10-CM

## 2021-09-09 RX ORDER — BUPROPION HYDROCHLORIDE 300 MG/1
TABLET ORAL
Qty: 90 TABLET | Refills: 0 | Status: SHIPPED | OUTPATIENT
Start: 2021-09-09 | End: 2022-10-03

## 2021-10-02 ENCOUNTER — HEALTH MAINTENANCE LETTER (OUTPATIENT)
Age: 31
End: 2021-10-02

## 2021-12-22 ENCOUNTER — LAB REQUISITION (OUTPATIENT)
Dept: LAB | Facility: CLINIC | Age: 31
End: 2021-12-22

## 2021-12-22 LAB — HBV SURFACE AB SERPL IA-ACNC: 13.62 M[IU]/ML

## 2021-12-22 PROCEDURE — 86706 HEP B SURFACE ANTIBODY: CPT | Performed by: INTERNAL MEDICINE

## 2021-12-22 PROCEDURE — 86481 TB AG RESPONSE T-CELL SUSP: CPT | Performed by: INTERNAL MEDICINE

## 2021-12-23 LAB
QUANTIFERON MITOGEN: 10 IU/ML
QUANTIFERON NIL TUBE: 0.06 IU/ML
QUANTIFERON TB1 TUBE: 0.05 IU/ML
QUANTIFERON TB2 TUBE: 0.03

## 2021-12-24 LAB
GAMMA INTERFERON BACKGROUND BLD IA-ACNC: 0.06 IU/ML
M TB IFN-G BLD-IMP: NEGATIVE
M TB IFN-G CD4+ BCKGRND COR BLD-ACNC: 9.94 IU/ML
MITOGEN IGNF BCKGRD COR BLD-ACNC: -0.01 IU/ML
MITOGEN IGNF BCKGRD COR BLD-ACNC: -0.03 IU/ML

## 2022-02-17 PROBLEM — Z00.00 ROUTINE GENERAL MEDICAL EXAMINATION AT A HEALTH CARE FACILITY: Status: ACTIVE | Noted: 2020-02-26

## 2022-04-25 DIAGNOSIS — Z30.011 ENCOUNTER FOR INITIAL PRESCRIPTION OF CONTRACEPTIVE PILLS: ICD-10-CM

## 2022-04-25 RX ORDER — NORETHINDRONE ACETATE AND ETHINYL ESTRADIOL 1MG-20(21)
1 KIT ORAL DAILY
Qty: 84 TABLET | Refills: 4 | Status: SHIPPED | OUTPATIENT
Start: 2022-04-25 | End: 2024-01-02

## 2022-04-25 NOTE — TELEPHONE ENCOUNTER
Refill request for OCP, last visit 4/2021. Pap normal. Sent per protocol. Sent reminder to schedule.

## 2022-07-09 ENCOUNTER — HEALTH MAINTENANCE LETTER (OUTPATIENT)
Age: 32
End: 2022-07-09

## 2022-07-19 ENCOUNTER — MYC MEDICAL ADVICE (OUTPATIENT)
Dept: OBGYN | Facility: CLINIC | Age: 32
End: 2022-07-19

## 2022-07-19 ENCOUNTER — APPOINTMENT (OUTPATIENT)
Dept: ULTRASOUND IMAGING | Facility: CLINIC | Age: 32
End: 2022-07-19
Attending: PHYSICIAN ASSISTANT
Payer: OTHER GOVERNMENT

## 2022-07-19 ENCOUNTER — HOSPITAL ENCOUNTER (EMERGENCY)
Facility: CLINIC | Age: 32
Discharge: HOME | End: 2022-07-19
Attending: PHYSICIAN ASSISTANT | Admitting: PHYSICIAN ASSISTANT
Payer: OTHER GOVERNMENT

## 2022-07-19 VITALS
HEART RATE: 109 BPM | TEMPERATURE: 98.6 F | BODY MASS INDEX: 51.75 KG/M2 | RESPIRATION RATE: 18 BRPM | SYSTOLIC BLOOD PRESSURE: 147 MMHG | WEIGHT: 265 LBS | DIASTOLIC BLOOD PRESSURE: 101 MMHG | OXYGEN SATURATION: 97 %

## 2022-07-19 DIAGNOSIS — O03.9 MISCARRIAGE: ICD-10-CM

## 2022-07-19 DIAGNOSIS — O20.0 THREATENED ABORTION: Primary | ICD-10-CM

## 2022-07-19 LAB
ABO/RH(D): NORMAL
ALBUMIN SERPL-MCNC: 3.7 G/DL (ref 3.4–5)
ALP SERPL-CCNC: 74 U/L (ref 40–150)
ALT SERPL W P-5'-P-CCNC: 27 U/L (ref 0–50)
ANION GAP SERPL CALCULATED.3IONS-SCNC: 6 MMOL/L (ref 3–14)
ANTIBODY SCREEN: NEGATIVE
AST SERPL W P-5'-P-CCNC: 17 U/L (ref 0–45)
B-HCG SERPL-ACNC: 1874 IU/L (ref 0–5)
BASOPHILS # BLD AUTO: 0 10E3/UL (ref 0–0.2)
BASOPHILS NFR BLD AUTO: 0 %
BILIRUB DIRECT SERPL-MCNC: <0.1 MG/DL (ref 0–0.2)
BILIRUB SERPL-MCNC: 0.3 MG/DL (ref 0.2–1.3)
BUN SERPL-MCNC: 8 MG/DL (ref 7–30)
CALCIUM SERPL-MCNC: 8.9 MG/DL (ref 8.5–10.1)
CHLORIDE BLD-SCNC: 107 MMOL/L (ref 94–109)
CO2 SERPL-SCNC: 25 MMOL/L (ref 20–32)
CREAT SERPL-MCNC: 0.83 MG/DL (ref 0.52–1.04)
EOSINOPHIL # BLD AUTO: 0.1 10E3/UL (ref 0–0.7)
EOSINOPHIL NFR BLD AUTO: 1 %
ERYTHROCYTE [DISTWIDTH] IN BLOOD BY AUTOMATED COUNT: 12.5 % (ref 10–15)
GFR SERPL CREATININE-BSD FRML MDRD: >90 ML/MIN/1.73M2
GLUCOSE BLD-MCNC: 120 MG/DL (ref 70–99)
HCT VFR BLD AUTO: 42.5 % (ref 35–47)
HGB BLD-MCNC: 14.3 G/DL (ref 11.7–15.7)
HOLD SPECIMEN: NORMAL
IMM GRANULOCYTES # BLD: 0 10E3/UL
IMM GRANULOCYTES NFR BLD: 0 %
LIPASE SERPL-CCNC: 387 U/L (ref 73–393)
LYMPHOCYTES # BLD AUTO: 2.6 10E3/UL (ref 0.8–5.3)
LYMPHOCYTES NFR BLD AUTO: 28 %
MCH RBC QN AUTO: 30 PG (ref 26.5–33)
MCHC RBC AUTO-ENTMCNC: 33.6 G/DL (ref 31.5–36.5)
MCV RBC AUTO: 89 FL (ref 78–100)
MONOCYTES # BLD AUTO: 0.4 10E3/UL (ref 0–1.3)
MONOCYTES NFR BLD AUTO: 4 %
NEUTROPHILS # BLD AUTO: 6.3 10E3/UL (ref 1.6–8.3)
NEUTROPHILS NFR BLD AUTO: 67 %
NRBC # BLD AUTO: 0 10E3/UL
NRBC BLD AUTO-RTO: 0 /100
PLATELET # BLD AUTO: 323 10E3/UL (ref 150–450)
POTASSIUM BLD-SCNC: 3.2 MMOL/L (ref 3.4–5.3)
PROT SERPL-MCNC: 7.3 G/DL (ref 6.8–8.8)
RBC # BLD AUTO: 4.77 10E6/UL (ref 3.8–5.2)
SODIUM SERPL-SCNC: 138 MMOL/L (ref 133–144)
SPECIMEN EXPIRATION DATE: NORMAL
WBC # BLD AUTO: 9.4 10E3/UL (ref 4–11)

## 2022-07-19 PROCEDURE — 36415 COLL VENOUS BLD VENIPUNCTURE: CPT | Performed by: EMERGENCY MEDICINE

## 2022-07-19 PROCEDURE — 85025 COMPLETE CBC W/AUTO DIFF WBC: CPT | Performed by: EMERGENCY MEDICINE

## 2022-07-19 PROCEDURE — 82248 BILIRUBIN DIRECT: CPT | Performed by: PHYSICIAN ASSISTANT

## 2022-07-19 PROCEDURE — 76801 OB US < 14 WKS SINGLE FETUS: CPT

## 2022-07-19 PROCEDURE — 84702 CHORIONIC GONADOTROPIN TEST: CPT | Performed by: PHYSICIAN ASSISTANT

## 2022-07-19 PROCEDURE — 99284 EMERGENCY DEPT VISIT MOD MDM: CPT | Mod: 25

## 2022-07-19 PROCEDURE — 250N000013 HC RX MED GY IP 250 OP 250 PS 637: Performed by: PHYSICIAN ASSISTANT

## 2022-07-19 PROCEDURE — 83690 ASSAY OF LIPASE: CPT | Performed by: PHYSICIAN ASSISTANT

## 2022-07-19 PROCEDURE — 86850 RBC ANTIBODY SCREEN: CPT | Performed by: PHYSICIAN ASSISTANT

## 2022-07-19 PROCEDURE — 85025 COMPLETE CBC W/AUTO DIFF WBC: CPT | Performed by: PHYSICIAN ASSISTANT

## 2022-07-19 PROCEDURE — 82947 ASSAY GLUCOSE BLOOD QUANT: CPT | Performed by: PHYSICIAN ASSISTANT

## 2022-07-19 RX ORDER — POTASSIUM CHLORIDE 1.5 G/1.58G
40 POWDER, FOR SOLUTION ORAL ONCE
Status: COMPLETED | OUTPATIENT
Start: 2022-07-19 | End: 2022-07-19

## 2022-07-19 RX ADMIN — POTASSIUM CHLORIDE 40 MEQ: 1.5 POWDER, FOR SOLUTION ORAL at 16:27

## 2022-07-19 ASSESSMENT — ENCOUNTER SYMPTOMS
DYSURIA: 0
NAUSEA: 1
APPETITE CHANGE: 1
COUGH: 0
ABDOMINAL PAIN: 1
SHORTNESS OF BREATH: 0
VOMITING: 0
CONSTIPATION: 0

## 2022-07-19 NOTE — ED TRIAGE NOTES
[facility]  ED Arrival Note    Arrives through triage. ABC's intact. A &O X4. . Pt arrives with c/o lower abdominal pain and vagina bleeding since this AM. Patient reports being about  Weeks pregnant. Miscarriage in May. Patient is tearful and anxious in triage      Visitors during triage: Father      Triage Interventions: IV and labs and US    Ambulatory: Yes    Meets Stroke Criteria?: No    Meets Trauma Criteria?: No    Shock Index: <0.8, for provider reference    Directed to: Triage Lobby    Pronouns: she/her       Triage Assessment     Row Name 07/19/22 1500       Triage Assessment (Adult)    Airway WDL WDL       Respiratory WDL    Respiratory WDL WDL       Skin Circulation/Temperature WDL    Skin Circulation/Temperature WDL WDL       Cardiac WDL    Cardiac WDL WDL       Peripheral/Neurovascular WDL    Peripheral Neurovascular WDL WDL       Cognitive/Neuro/Behavioral WDL    Cognitive/Neuro/Behavioral WDL WDL

## 2022-07-19 NOTE — ED PROVIDER NOTES
History   Chief Complaint:  Abdominal Pain and Vaginal Bleeding       HPI   Alva Joshi is a 31 year old female, 8 weeks pregnant, with history of endometriosis and micarriage who presents with abdominal pain and vaginal bleeding. Today, she noticed a brown vaginal discharge that turned into pink, and then red. She noticed a sudden onset of menstrual cramps localized about her left side. She reports that she last had a miscarriage sometime in May and presents to the emergency department for concerns about miscarriage. She endorses nausea and appetite change. Patient denies cough, shortness of breath, chest pain, vomiting, dysuria, and constipation. She is currently using prenatal for pregnancy management.     Review of Systems   Constitutional: Positive for appetite change.   Respiratory: Negative for cough and shortness of breath.    Gastrointestinal: Positive for abdominal pain and nausea. Negative for constipation and vomiting.   Genitourinary: Positive for vaginal bleeding. Negative for dysuria.   All other systems reviewed and are negative.    Allergies:  Augmentin  Penicillins  Zithromax [Azithromycin]    Medications:  Tessalon   Wellbutrin   Toprol-XL    Past Medical History:     Endometriosis   Migraines   Routine general medical examination at a [facility] care facility  Family history of malignant neoplasm of breast  Morbid obesity  Major depressive disorder  Viral URI with cough     Past Surgical History:    Shoulder arthroscopy   Sinus surgery   ENT surgery   Pelvic clean out   Cystectomy     Family History:    Migraines     Social History:  The patient presents to the ED with her father.   Arrived by private vehicle.     Physical Exam     Patient Vitals for the past 24 hrs:   BP Temp Temp src Pulse Resp SpO2 Weight   07/19/22 1600 (!) 147/101 -- -- 109 -- 97 % --   07/19/22 1510 (!) 148/85 -- -- (!) 121 -- 98 % --   07/19/22 1504 (!) 83/69 98.6  F (37  C) Temporal (!) 126 18 99 % 120.2 kg (265 lb)      Physical Exam    General: Well appearing, pleasant female, resting on exam bed  HEENT: No evidence of trauma.  Conjunctive are clear.  Extraocular eye movements intact.  Neck range of motion intact.  Nose and throat clear.  Respiratory: Good breath sounds bilaterally  Cardiovascular: Fast rate and rhythm   Gastrointestinal: Soft, nontender.   Musculoskeletal: Atraumatic  Skin: Exposed skin clear.  Neurologic: Alert.  Psych:  Patient is cooperative, with normal affect.  .  Emergency Department Course     Imaging:  OB  US 1st trimester w transvag   Preliminary Result   IMPRESSION: Possible small intrauterine gestational sac measures 1 cm,   corresponding to an estimated gestational age of 5 weeks 5 days. No   yolk sac or embryo is identified. First trimester pregnancy failure   could have this appearance, and ectopic pregnancy is not entirely   excluded on the basis of this exam. Clinical correlation and close   follow-up are recommended to confirm a viable intrauterine pregnancy.           Report per radiology    Laboratory:  Labs Ordered and Resulted from Time of ED Arrival to Time of ED Departure   BASIC METABOLIC PANEL - Abnormal       Result Value    Sodium 138      Potassium 3.2 (*)     Chloride 107      Carbon Dioxide (CO2) 25      Anion Gap 6      Urea Nitrogen 8      Creatinine 0.83      Calcium 8.9      Glucose 120 (*)     GFR Estimate >90     HCG QUANTITATIVE PREGNANCY - Abnormal    hCG Quantitative 1,874 (*)    HEPATIC FUNCTION PANEL - Normal    Bilirubin Total 0.3      Bilirubin Direct <0.1      Protein Total 7.3      Albumin 3.7      Alkaline Phosphatase 74      AST 17      ALT 27     LIPASE - Normal    Lipase 387     CBC WITH PLATELETS AND DIFFERENTIAL    WBC Count 9.4      RBC Count 4.77      Hemoglobin 14.3      Hematocrit 42.5      MCV 89      MCH 30.0      MCHC 33.6      RDW 12.5      Platelet Count 323      % Neutrophils 67      % Lymphocytes 28      % Monocytes 4      % Eosinophils 1      %  Basophils 0      % Immature Granulocytes 0      NRBCs per 100 WBC 0      Absolute Neutrophils 6.3      Absolute Lymphocytes 2.6      Absolute Monocytes 0.4      Absolute Eosinophils 0.1      Absolute Basophils 0.0      Absolute Immature Granulocytes 0.0      Absolute NRBCs 0.0     TYPE AND SCREEN, ADULT    ABO/RH(D) AB POS      Antibody Screen Negative      SPECIMEN EXPIRATION DATE [id number]     ABO/RH TYPE AND SCREEN      Emergency Department Course:       Reviewed:  I reviewed nursing notes, vitals, past medical history and Care Everywhere    Assessments:  1530 I obtained history and examined the patient as noted above.   1642 I rechecked the patient and explained findings. At this point I feel that the patient is safe for discharge, and the patient agrees.     Interventions:  1627 Potassium chloride 40 mEq Oral     Disposition:  The patient was discharged to home.     Impression & Plan     Medical Decision Making:  Alva Joshi is a 31 year old female who is reportedly 8 weeks pregnant, presents emergency room today for evaluation of vaginal bleeding.  See HPI.  Her initial blood pressure is low when she is quite tachycardic.  This improved upon recheck.  She has minimal abdominal tenderness.  Hemoglobin normal.  Her potassium was a bit low so this was replaced.  She will follow-up regarding this.  She is Rh+.  Her LFTs and lipase are unremarkable.  Beta quant is 1874.  She went for an ultrasound that revealed a gestational sac but no yolk sac or embryo.  Concerns for pregnancy failure by radiology.  We also discussed that ectopic pregnancy is not entirely excluded based on this exam as they could not see the left ovary and adjacent structures due to bowel gas.  Clinically I do not feel she is suffering from ectopic pregnancy as her beta quant is 1874, which at around 8 weeks, we expect to be much higher.  I discussed this with the patient and encouraged her to follow-up with her OB doctor for a repeat  ultrasound and beta quant.  She is to return with new or worsening symptoms.  No further questions and agrees with plan.    Diagnosis:    ICD-10-CM    1. Miscarriage  O03.9      Scribe Disclosure:  I, Christine Webber, am serving as a scribe at 3:30 PM on 7/19/2022 to document services personally performed by José Nieto PA-C based on my observations and the provider's statements to me.          José Nieto PA-C  07/19/22 1822

## 2022-07-20 NOTE — CONFIDENTIAL NOTE
Spoke with Alva.  She has made an OB appointment back in [place] for Monday.  They are suggesting a BHCG level Friday to help direct her care.    Order placed.

## 2022-07-22 ENCOUNTER — TELEPHONE (OUTPATIENT)
Dept: OBGYN | Facility: CLINIC | Age: 32
End: 2022-07-22

## 2022-07-22 ENCOUNTER — LAB (OUTPATIENT)
Dept: LAB | Facility: CLINIC | Age: 32
End: 2022-07-22
Attending: ADVANCED PRACTICE MIDWIFE
Payer: OTHER GOVERNMENT

## 2022-07-22 DIAGNOSIS — O20.0 THREATENED ABORTION: ICD-10-CM

## 2022-07-22 LAB — B-HCG SERPL-ACNC: 288 IU/L (ref 0–5)

## 2022-07-22 PROCEDURE — 36415 COLL VENOUS BLD VENIPUNCTURE: CPT

## 2022-07-22 PROCEDURE — 84702 CHORIONIC GONADOTROPIN TEST: CPT

## 2022-07-22 NOTE — TELEPHONE ENCOUNTER
"Spoke with patient via phone to inquire if she had her Bhcg results and if she was going today to have the other Bhcg drawn.    Patient stated she had seen the first result and is going today to have the 2nd drawn. Patient stated she had a miscarriage last week.    Inquired if the patient would like us to call her when we see the results and patient stated, \"No\" she will be able to see them in [facility].    Instructed patient if she has any further questions or concerns to please call us.    Patient verbalized understanding and all questions were answered.  "

## 2022-07-22 NOTE — TELEPHONE ENCOUNTER
----- Message from Suma Erwin RN sent at 7/20/2022  9:25 AM CDT -----  Regarding: check [facility] results

## 2022-09-03 ENCOUNTER — HEALTH MAINTENANCE LETTER (OUTPATIENT)
Age: 32
End: 2022-09-03

## 2022-10-03 ENCOUNTER — MYC REFILL (OUTPATIENT)
Dept: FAMILY MEDICINE | Facility: CLINIC | Age: 32
End: 2022-10-03

## 2022-10-03 DIAGNOSIS — F33.1 MODERATE EPISODE OF RECURRENT MAJOR DEPRESSIVE DISORDER (H): ICD-10-CM

## 2022-10-06 RX ORDER — BUPROPION HYDROCHLORIDE 300 MG/1
300 TABLET ORAL EVERY MORNING
Qty: 90 TABLET | Refills: 0 | Status: SHIPPED | OUTPATIENT
Start: 2022-10-06 | End: 2024-01-02

## 2022-10-06 NOTE — TELEPHONE ENCOUNTER
Routing refill request to provider for review/approval because:  PHQ 7/29/2020 1/24/2021 4/16/2021   PHQ-9 Total Score 5 1 0   Q9: Thoughts of better off dead/self-harm past 2 weeks Not at all Not at all Not at all     SSRIs Protocol Failed 10/03/2022 06:03 PM   Protocol Details  PHQ-9 score less than 5 in past 6 months    No active pregnancy on record    No positive pregnancy test in last 12 months    Recent (6 mo) or future (30 days) visit within the authorizing provider's specialty     PHQ and JOSE MIGUEL sent to pt via my chart    Ann BOLTON RN  EP Triage

## 2023-05-04 ENCOUNTER — PATIENT MESSAGE (OUTPATIENT)
Dept: OBSTETRICS AND GYNECOLOGY | Facility: CLINIC | Age: 33
End: 2023-05-04
Payer: OTHER GOVERNMENT

## 2023-05-09 ENCOUNTER — INITIAL PRENATAL (OUTPATIENT)
Dept: OBSTETRICS AND GYNECOLOGY | Facility: CLINIC | Age: 33
End: 2023-05-09
Payer: OTHER GOVERNMENT

## 2023-05-09 VITALS — DIASTOLIC BLOOD PRESSURE: 74 MMHG | WEIGHT: 276.19 LBS | SYSTOLIC BLOOD PRESSURE: 126 MMHG

## 2023-05-09 DIAGNOSIS — O10.913 CHRONIC HYPERTENSION COMPLICATING OR REASON FOR CARE DURING PREGNANCY, THIRD TRIMESTER: ICD-10-CM

## 2023-05-09 DIAGNOSIS — O24.410 DIET CONTROLLED GESTATIONAL DIABETES MELLITUS (GDM) IN THIRD TRIMESTER: ICD-10-CM

## 2023-05-09 DIAGNOSIS — Z3A.34 34 WEEKS GESTATION OF PREGNANCY: Primary | ICD-10-CM

## 2023-05-09 DIAGNOSIS — E66.01 CLASS 3 SEVERE OBESITY WITH BODY MASS INDEX (BMI) OF 50.0 TO 59.9 IN ADULT, UNSPECIFIED OBESITY TYPE, UNSPECIFIED WHETHER SERIOUS COMORBIDITY PRESENT: ICD-10-CM

## 2023-05-09 PROCEDURE — 87186 SC STD MICRODIL/AGAR DIL: CPT | Performed by: OBSTETRICS & GYNECOLOGY

## 2023-05-09 PROCEDURE — 87077 CULTURE AEROBIC IDENTIFY: CPT | Performed by: OBSTETRICS & GYNECOLOGY

## 2023-05-09 PROCEDURE — 87086 URINE CULTURE/COLONY COUNT: CPT | Performed by: OBSTETRICS & GYNECOLOGY

## 2023-05-09 PROCEDURE — 80307 DRUG TEST PRSMV CHEM ANLYZR: CPT | Performed by: OBSTETRICS & GYNECOLOGY

## 2023-05-09 PROCEDURE — 87591 N.GONORRHOEAE DNA AMP PROB: CPT | Performed by: OBSTETRICS & GYNECOLOGY

## 2023-05-09 PROCEDURE — 87088 URINE BACTERIA CULTURE: CPT | Performed by: OBSTETRICS & GYNECOLOGY

## 2023-05-09 RX ORDER — OMEPRAZOLE 20 MG/1
20 CAPSULE, DELAYED RELEASE ORAL
COMMUNITY
Start: 2022-11-01

## 2023-05-09 RX ORDER — ONDANSETRON 4 MG/1
4 TABLET, FILM COATED ORAL EVERY 6 HOURS PRN
COMMUNITY
Start: 2022-12-21 | End: 2023-09-15

## 2023-05-09 RX ORDER — ONDANSETRON 4 MG/1
4 TABLET, ORALLY DISINTEGRATING ORAL
COMMUNITY
Start: 2022-10-31 | End: 2023-09-15

## 2023-05-09 RX ORDER — ASPIRIN 81 MG/1
81 TABLET ORAL
COMMUNITY
End: 2023-09-15

## 2023-05-09 RX ORDER — PROMETHAZINE HYDROCHLORIDE 25 MG/1
25 SUPPOSITORY RECTAL
COMMUNITY
Start: 2023-03-21 | End: 2023-09-15

## 2023-05-09 RX ORDER — BUPROPION HYDROCHLORIDE 300 MG/1
300 TABLET ORAL
COMMUNITY
Start: 2022-10-06 | End: 2023-09-15

## 2023-05-09 NOTE — PROGRESS NOTES
2023  Chief Complaint   Patient presents with    Routine Prenatal Visit     Pt is new to the clinic.Pt is here for a 34 wk OB visit     32 y.o.  at 34w1d  Estimated Date of Delivery: 2023, by Last Menstrual Period, dating reviewed.      Patient reports: Good fetal movements reported. No Bleeding or contractions . She is doing well.  She is taking prenatal vitamins. Ms. Kuhn   is adjusting well and has a good support system of family and friends. She is coping with pregnancy and having no difficulty with sleep.      Patient transferring care as she is in the  and recently moved to the area.  She has history of chronic hypertension and gestational diabetes however these were diagnosed early in pregnancy and has had no problems with her blood pressure despite not being on medication.  Her diabetes screening was also performed shortly after a course of steroids secondary to bronchitis but she did fail her 1 hour and her 3 hour and has been diet controlled since.  She is checking her blood sugars and they are all very normal.  She is being seen by MFM and has a history of SVT which is controlled no longer on metoprolol.  She is scheduled for weekly biophysical profiles as well as a follow-up MFM visit in 1 week.  Prenatal labs reviewed and updated today    OB History    Para Term  AB Living   3       2 1   SAB IAB Ectopic Multiple Live Births   2              # Outcome Date GA Lbr Brodie/2nd Weight Sex Delivery Anes PTL Lv   3 Current            2 SAB            1 SAB                Review of Systems:  General ROS: negative for headache or visual changes  Breast ROS: negative for breast lumps  Gastrointestinal ROS: negative for constipation, diarrhea or nausea/vomiting  Musculoskeletal ROS: negative for pain in joints or swelling in face or hands.   Neurological ROS: negative for - headaches, numbness/tingling or visual changes      Physical Exam:  /74   Wt 125.3 kg (276  lb 3.2 oz)   LMP 09/12/2022     Constitutional: She is oriented to person, place, and time. She appears well-developed and well-nourished. No distress.     Pulmonary/Chest: Effort normal. No respiratory distress  Abdominal: Soft, gravid, nontender. No rebound and no guarding.   Genitourinary: Deferred   Musculoskeletal: Normal range of motion, Minimal peripheral edema.   Neurological: She is alert and oriented to person, place, and time. Coordination normal.   Skin: Skin is warm and dry. She is not diaphoretic.  Psychiatric: She has a normal mood and affect.      Assessment:  Overall doing well.   32 y.o.at 34w1d   There is no problem list on file for this patient.    Current Outpatient Medications on File Prior to Visit   Medication Sig Dispense Refill    aspirin (ECOTRIN) 81 MG EC tablet Take 81 mg by mouth.      aspirin 81 mg Cap       omeprazole (PRILOSEC) 20 MG capsule Take 20 mg by mouth.      ondansetron (ZOFRAN ODT ORAL)       ondansetron (ZOFRAN) 4 MG tablet Take 4 mg by mouth every 6 (six) hours as needed.      ondansetron (ZOFRAN-ODT) 4 MG TbDL Take 4 mg by mouth.      prenat.vits,newton,min-iron-folic Tab       promethazine (PHENERGAN) 25 MG suppository Place 25 mg rectally.      buPROPion (WELLBUTRIN XL) 300 MG 24 hr tablet Take 300 mg by mouth.       No current facility-administered medications on file prior to visit.     34 weeks gestation of pregnancy  -     Urine culture  -     C. trachomatis/N. gonorrhoeae by AMP DNA [facility]; Urine  -     Toxicology screen, urine  -     US OB/GYN Procedure (Viewpoint)-Future; Future  -     US OB/GYN Procedure (Viewpoint)-Future; Standing    Chronic hypertension complicating or reason for care during pregnancy, third trimester  -     US OB/GYN Procedure (Viewpoint)-Future; Future  -     US OB/GYN Procedure (Viewpoint)-Future; Standing    Diet controlled gestational diabetes mellitus (GDM) in third trimester  -     US OB/GYN Procedure (Viewpoint)-Future;  Future  -     US OB/GYN Procedure (Viewpoint)-Future; Standing    Class 3 severe obesity with body mass index (BMI) of 50.0 to 59.9 in adult, unspecified obesity type, unspecified whether serious comorbidity present  -     US OB/GYN Procedure (Viewpoint)-Future; Future  -     US OB/GYN Procedure (Viewpoint)-Future; Standing       Plan:  Overall doing well  Follow up 2 Weeks, bleeding/pain precautions, kick counts, labor precautions discussed.  Continue weekly BPP.  Follow-up growth with MFM in 1 week.  Blood sugar diary reviewed and stable.  Blood pressure stable.  Will administer Tdap at next visit.  Plan discussed with patient expressed understanding.

## 2023-05-10 LAB
AMPHET+METHAMPHET UR QL: NEGATIVE
BARBITURATES UR QL SCN>200 NG/ML: NEGATIVE
BENZODIAZ UR QL SCN>200 NG/ML: NEGATIVE
BZE UR QL SCN: NEGATIVE
CANNABINOIDS UR QL SCN: NEGATIVE
CREAT UR-MCNC: 138 MG/DL (ref 15–325)
ETHANOL UR-MCNC: <10 MG/DL
METHADONE UR QL SCN>300 NG/ML: NEGATIVE
OPIATES UR QL SCN: NEGATIVE
PCP UR QL SCN>25 NG/ML: NEGATIVE
TOXICOLOGY INFORMATION: NORMAL

## 2023-05-12 LAB
BACTERIA UR CULT: ABNORMAL
C TRACH DNA SPEC QL NAA+PROBE: NOT DETECTED
N GONORRHOEA DNA SPEC QL NAA+PROBE: NOT DETECTED

## 2023-05-15 ENCOUNTER — PROCEDURE VISIT (OUTPATIENT)
Dept: MATERNAL FETAL MEDICINE | Facility: CLINIC | Age: 33
End: 2023-05-15
Payer: OTHER GOVERNMENT

## 2023-05-15 ENCOUNTER — PATIENT MESSAGE (OUTPATIENT)
Dept: OTHER | Facility: OTHER | Age: 33
End: 2023-05-15
Payer: OTHER GOVERNMENT

## 2023-05-15 DIAGNOSIS — Z3A.34 34 WEEKS GESTATION OF PREGNANCY: ICD-10-CM

## 2023-05-15 DIAGNOSIS — O10.913 CHRONIC HYPERTENSION COMPLICATING OR REASON FOR CARE DURING PREGNANCY, THIRD TRIMESTER: ICD-10-CM

## 2023-05-15 DIAGNOSIS — O24.410 DIET CONTROLLED GESTATIONAL DIABETES MELLITUS (GDM) IN THIRD TRIMESTER: ICD-10-CM

## 2023-05-15 DIAGNOSIS — E66.01 CLASS 3 SEVERE OBESITY WITH BODY MASS INDEX (BMI) OF 50.0 TO 59.9 IN ADULT, UNSPECIFIED OBESITY TYPE, UNSPECIFIED WHETHER SERIOUS COMORBIDITY PRESENT: ICD-10-CM

## 2023-05-15 PROCEDURE — 76819 US OB/GYN PROCEDURE (VIEWPOINT): ICD-10-PCS | Mod: S$GLB,,, | Performed by: OBSTETRICS & GYNECOLOGY

## 2023-05-15 PROCEDURE — 76819 FETAL BIOPHYS PROFIL W/O NST: CPT | Mod: S$GLB,,, | Performed by: OBSTETRICS & GYNECOLOGY

## 2023-05-22 ENCOUNTER — PROCEDURE VISIT (OUTPATIENT)
Dept: MATERNAL FETAL MEDICINE | Facility: CLINIC | Age: 33
End: 2023-05-22
Payer: OTHER GOVERNMENT

## 2023-05-22 DIAGNOSIS — Z3A.34 34 WEEKS GESTATION OF PREGNANCY: ICD-10-CM

## 2023-05-22 DIAGNOSIS — O10.913 CHRONIC HYPERTENSION COMPLICATING OR REASON FOR CARE DURING PREGNANCY, THIRD TRIMESTER: ICD-10-CM

## 2023-05-22 DIAGNOSIS — O24.410 DIET CONTROLLED GESTATIONAL DIABETES MELLITUS (GDM) IN THIRD TRIMESTER: ICD-10-CM

## 2023-05-22 DIAGNOSIS — N30.00 ACUTE CYSTITIS WITHOUT HEMATURIA: Primary | ICD-10-CM

## 2023-05-22 DIAGNOSIS — E66.01 CLASS 3 SEVERE OBESITY WITH BODY MASS INDEX (BMI) OF 50.0 TO 59.9 IN ADULT, UNSPECIFIED OBESITY TYPE, UNSPECIFIED WHETHER SERIOUS COMORBIDITY PRESENT: ICD-10-CM

## 2023-05-22 PROCEDURE — 76819 US OB/GYN PROCEDURE (VIEWPOINT): ICD-10-PCS | Mod: S$GLB,,, | Performed by: OBSTETRICS & GYNECOLOGY

## 2023-05-22 PROCEDURE — 76819 FETAL BIOPHYS PROFIL W/O NST: CPT | Mod: S$GLB,,, | Performed by: OBSTETRICS & GYNECOLOGY

## 2023-05-22 RX ORDER — NITROFURANTOIN 25; 75 MG/1; MG/1
100 CAPSULE ORAL 2 TIMES DAILY
Qty: 14 CAPSULE | Refills: 0 | Status: SHIPPED | OUTPATIENT
Start: 2023-05-22 | End: 2023-05-29

## 2023-05-23 ENCOUNTER — ROUTINE PRENATAL (OUTPATIENT)
Dept: OBSTETRICS AND GYNECOLOGY | Facility: CLINIC | Age: 33
End: 2023-05-23
Payer: OTHER GOVERNMENT

## 2023-05-23 VITALS — WEIGHT: 279 LBS | DIASTOLIC BLOOD PRESSURE: 80 MMHG | SYSTOLIC BLOOD PRESSURE: 126 MMHG

## 2023-05-23 DIAGNOSIS — O10.913 CHRONIC HYPERTENSION COMPLICATING OR REASON FOR CARE DURING PREGNANCY, THIRD TRIMESTER: ICD-10-CM

## 2023-05-23 DIAGNOSIS — O24.410 DIET CONTROLLED GESTATIONAL DIABETES MELLITUS (GDM) IN THIRD TRIMESTER: ICD-10-CM

## 2023-05-23 DIAGNOSIS — Z3A.36 36 WEEKS GESTATION OF PREGNANCY: Primary | ICD-10-CM

## 2023-05-23 DIAGNOSIS — E66.01 CLASS 3 SEVERE OBESITY WITH BODY MASS INDEX (BMI) OF 50.0 TO 59.9 IN ADULT, UNSPECIFIED OBESITY TYPE, UNSPECIFIED WHETHER SERIOUS COMORBIDITY PRESENT: ICD-10-CM

## 2023-05-23 DIAGNOSIS — N30.00 ACUTE CYSTITIS WITHOUT HEMATURIA: ICD-10-CM

## 2023-05-23 PROCEDURE — 87147 CULTURE TYPE IMMUNOLOGIC: CPT | Performed by: OBSTETRICS & GYNECOLOGY

## 2023-05-23 PROCEDURE — 87184 SC STD DISK METHOD PER PLATE: CPT | Performed by: OBSTETRICS & GYNECOLOGY

## 2023-05-23 PROCEDURE — 87081 CULTURE SCREEN ONLY: CPT | Performed by: OBSTETRICS & GYNECOLOGY

## 2023-05-23 NOTE — PROGRESS NOTES
2023  Chief Complaint   Patient presents with    Routine Prenatal Visit     Pt is here for a 36 wk OB visit     32 y.o.  at 36w1d  Estimated Date of Delivery: 2023, by Last Menstrual Period, dating reviewed.      Patient reports: Good fetal movements reported. No Bleeding or contractions . She is doing well.  She is taking prenatal vitamins. Ms. Kuhn   is adjusting well and has a good support system of family and friends. She is coping with pregnancy and having no difficulty with sleep.    Prenatal labs reviewed and updated today    OB History    Para Term  AB Living   3       2 1   SAB IAB Ectopic Multiple Live Births   2              # Outcome Date GA Lbr Brodie/2nd Weight Sex Delivery Anes PTL Lv   3 Current            2 SAB            1 SAB                Review of Systems:  General ROS: negative for headache or visual changes  Breast ROS: negative for breast lumps  Gastrointestinal ROS: negative for constipation, diarrhea or nausea/vomiting  Musculoskeletal ROS: negative for pain in joints or swelling in face or hands.   Neurological ROS: negative for - headaches, numbness/tingling or visual changes      Physical Exam:  /80   Wt 126.6 kg (279 lb)   LMP 2022     Constitutional: She is oriented to person, place, and time. She appears well-developed and well-nourished. No distress.     Pulmonary/Chest: Effort normal. No respiratory distress  Abdominal: Soft, gravid, nontender. No rebound and no guarding.   Genitourinary: Deferred   Musculoskeletal: Normal range of motion, Minimal peripheral edema.   Neurological: She is alert and oriented to person, place, and time. Coordination normal.   Skin: Skin is warm and dry. She is not diaphoretic.  Psychiatric: She has a normal mood and affect.      Assessment:  Overall doing well.   32 y.o.at 36w1d   There is no problem list on file for this patient.    Current Outpatient Medications on File Prior to Visit   Medication Sig  Dispense Refill    aspirin (ECOTRIN) 81 MG EC tablet Take 81 mg by mouth.      aspirin 81 mg Cap       buPROPion (WELLBUTRIN XL) 300 MG 24 hr tablet Take 300 mg by mouth.      nitrofurantoin, macrocrystal-monohydrate, (MACROBID) 100 MG capsule Take 1 capsule (100 mg total) by mouth 2 (two) times daily. for 7 days 14 capsule 0    omeprazole (PRILOSEC) 20 MG capsule Take 20 mg by mouth.      ondansetron (ZOFRAN ODT ORAL)       ondansetron (ZOFRAN) 4 MG tablet Take 4 mg by mouth every 6 (six) hours as needed.      ondansetron (ZOFRAN-ODT) 4 MG TbDL Take 4 mg by mouth.      prenat.vits,newton,min-iron-folic Tab       promethazine (PHENERGAN) 25 MG suppository Place 25 mg rectally.       No current facility-administered medications on file prior to visit.     36 weeks gestation of pregnancy  -     Strep B Screen, Vaginal / Rectal    Diet controlled gestational diabetes mellitus (GDM) in third trimester    Class 3 severe obesity with body mass index (BMI) of 50.0 to 59.9 in adult, unspecified obesity type, unspecified whether serious comorbidity present    Chronic hypertension complicating or reason for care during pregnancy, third trimester       Plan:  Overall doing well  Follow up 1 Weeks, bleeding/pain precautions, kick counts, labor precautions discussed.   Desires IOL at 39 weeks.  Will schedule.

## 2023-05-26 ENCOUNTER — PROCEDURE VISIT (OUTPATIENT)
Dept: MATERNAL FETAL MEDICINE | Facility: CLINIC | Age: 33
End: 2023-05-26
Payer: OTHER GOVERNMENT

## 2023-05-26 DIAGNOSIS — O24.410 DIET CONTROLLED GESTATIONAL DIABETES MELLITUS (GDM) IN THIRD TRIMESTER: ICD-10-CM

## 2023-05-26 DIAGNOSIS — O10.913 CHRONIC HYPERTENSION COMPLICATING OR REASON FOR CARE DURING PREGNANCY, THIRD TRIMESTER: ICD-10-CM

## 2023-05-26 DIAGNOSIS — Z3A.34 34 WEEKS GESTATION OF PREGNANCY: ICD-10-CM

## 2023-05-26 DIAGNOSIS — E66.01 CLASS 3 SEVERE OBESITY WITH BODY MASS INDEX (BMI) OF 50.0 TO 59.9 IN ADULT, UNSPECIFIED OBESITY TYPE, UNSPECIFIED WHETHER SERIOUS COMORBIDITY PRESENT: ICD-10-CM

## 2023-05-26 PROCEDURE — 76816 US OB/GYN PROCEDURE (VIEWPOINT): ICD-10-PCS | Mod: S$GLB,,, | Performed by: OBSTETRICS & GYNECOLOGY

## 2023-05-26 PROCEDURE — 76819 US OB/GYN PROCEDURE (VIEWPOINT): ICD-10-PCS | Mod: S$GLB,,, | Performed by: OBSTETRICS & GYNECOLOGY

## 2023-05-26 PROCEDURE — 76819 FETAL BIOPHYS PROFIL W/O NST: CPT | Mod: S$GLB,,, | Performed by: OBSTETRICS & GYNECOLOGY

## 2023-05-26 PROCEDURE — 76816 OB US FOLLOW-UP PER FETUS: CPT | Mod: S$GLB,,, | Performed by: OBSTETRICS & GYNECOLOGY

## 2023-05-28 LAB — BACTERIA SPEC AEROBE CULT: ABNORMAL

## 2023-05-29 ENCOUNTER — PROCEDURE VISIT (OUTPATIENT)
Dept: MATERNAL FETAL MEDICINE | Facility: CLINIC | Age: 33
End: 2023-05-29
Payer: OTHER GOVERNMENT

## 2023-05-29 DIAGNOSIS — O10.913 CHRONIC HYPERTENSION COMPLICATING OR REASON FOR CARE DURING PREGNANCY, THIRD TRIMESTER: ICD-10-CM

## 2023-05-29 DIAGNOSIS — Z3A.34 34 WEEKS GESTATION OF PREGNANCY: ICD-10-CM

## 2023-05-29 DIAGNOSIS — E66.01 CLASS 3 SEVERE OBESITY WITH BODY MASS INDEX (BMI) OF 50.0 TO 59.9 IN ADULT, UNSPECIFIED OBESITY TYPE, UNSPECIFIED WHETHER SERIOUS COMORBIDITY PRESENT: ICD-10-CM

## 2023-05-29 DIAGNOSIS — O24.410 DIET CONTROLLED GESTATIONAL DIABETES MELLITUS (GDM) IN THIRD TRIMESTER: ICD-10-CM

## 2023-05-29 PROCEDURE — 76819 FETAL BIOPHYS PROFIL W/O NST: CPT | Mod: S$GLB,,, | Performed by: OBSTETRICS & GYNECOLOGY

## 2023-05-29 PROCEDURE — 76819 US OB/GYN PROCEDURE (VIEWPOINT): ICD-10-PCS | Mod: S$GLB,,, | Performed by: OBSTETRICS & GYNECOLOGY

## 2023-06-01 ENCOUNTER — ROUTINE PRENATAL (OUTPATIENT)
Dept: OBSTETRICS AND GYNECOLOGY | Facility: CLINIC | Age: 33
End: 2023-06-01
Payer: OTHER GOVERNMENT

## 2023-06-01 VITALS — WEIGHT: 284.38 LBS | SYSTOLIC BLOOD PRESSURE: 128 MMHG | DIASTOLIC BLOOD PRESSURE: 76 MMHG

## 2023-06-01 DIAGNOSIS — E66.01 CLASS 3 SEVERE OBESITY WITH BODY MASS INDEX (BMI) OF 50.0 TO 59.9 IN ADULT, UNSPECIFIED OBESITY TYPE, UNSPECIFIED WHETHER SERIOUS COMORBIDITY PRESENT: ICD-10-CM

## 2023-06-01 DIAGNOSIS — Z3A.37 37 WEEKS GESTATION OF PREGNANCY: Primary | ICD-10-CM

## 2023-06-01 DIAGNOSIS — O10.913 CHRONIC HYPERTENSION COMPLICATING OR REASON FOR CARE DURING PREGNANCY, THIRD TRIMESTER: ICD-10-CM

## 2023-06-01 DIAGNOSIS — O24.410 DIET CONTROLLED GESTATIONAL DIABETES MELLITUS (GDM) IN THIRD TRIMESTER: ICD-10-CM

## 2023-06-01 NOTE — PROGRESS NOTES
2023  Chief Complaint   Patient presents with    Routine Prenatal Visit     Pt is here for a 37 wk OB visit     32 y.o.  at 37w3d  Estimated Date of Delivery: 2023, by Last Menstrual Period, dating reviewed.      Patient reports: Good fetal movements reported. No Bleeding or contractions . She is doing well.  She is taking prenatal vitamins. Ms. Kuhn   is adjusting well and has a good support system of family and friends. She is coping with pregnancy and having no difficulty with sleep.  Patient states blood glucoses continue to be well controlled.  Prenatal labs reviewed and updated today    OB History    Para Term  AB Living   3       2 0   SAB IAB Ectopic Multiple Live Births   2              # Outcome Date GA Lbr Brodie/2nd Weight Sex Delivery Anes PTL Lv   3 Current            2 SAB            1 SAB                Review of Systems:  General ROS: negative for headache or visual changes  Breast ROS: negative for breast lumps  Gastrointestinal ROS: negative for constipation, diarrhea or nausea/vomiting  Musculoskeletal ROS: negative for pain in joints or swelling in face or hands.   Neurological ROS: negative for - headaches, numbness/tingling or visual changes      Physical Exam:  /76   Wt 129 kg (284 lb 6.4 oz)   LMP 2022     Constitutional: She is oriented to person, place, and time. She appears well-developed and well-nourished. No distress.     Pulmonary/Chest: Effort normal. No respiratory distress  Abdominal: Soft, gravid, nontender. No rebound and no guarding.   Genitourinary: Deferred   Musculoskeletal: Normal range of motion, Minimal peripheral edema.   Neurological: She is alert and oriented to person, place, and time. Coordination normal.   Skin: Skin is warm and dry. She is not diaphoretic.  Psychiatric: She has a normal mood and affect.      Assessment:  Overall doing well.   32 y.o.at 37w3d   There is no problem list on file for this  patient.    Current Outpatient Medications on File Prior to Visit   Medication Sig Dispense Refill    aspirin (ECOTRIN) 81 MG EC tablet Take 81 mg by mouth.      aspirin 81 mg Cap       buPROPion (WELLBUTRIN XL) 300 MG 24 hr tablet Take 300 mg by mouth.      omeprazole (PRILOSEC) 20 MG capsule Take 20 mg by mouth.      ondansetron (ZOFRAN ODT ORAL)       ondansetron (ZOFRAN) 4 MG tablet Take 4 mg by mouth every 6 (six) hours as needed.      prenat.vits,newton,min-iron-folic Tab       promethazine (PHENERGAN) 25 MG suppository Place 25 mg rectally.      ondansetron (ZOFRAN-ODT) 4 MG TbDL Take 4 mg by mouth.       No current facility-administered medications on file prior to visit.     37 weeks gestation of pregnancy    Chronic hypertension complicating or reason for care during pregnancy, third trimester    Diet controlled gestational diabetes mellitus (GDM) in third trimester    Class 3 severe obesity with body mass index (BMI) of 50.0 to 59.9 in adult, unspecified obesity type, unspecified whether serious comorbidity present       Plan:  Overall doing well  Follow up 1 Weeks, bleeding/pain precautions, kick counts, labor precautions discussed. Continue close blood glucose control and monitor blood pressure.  Return in 1 week.  Continue weekly  testing

## 2023-06-05 ENCOUNTER — PROCEDURE VISIT (OUTPATIENT)
Dept: MATERNAL FETAL MEDICINE | Facility: CLINIC | Age: 33
End: 2023-06-05
Payer: OTHER GOVERNMENT

## 2023-06-05 DIAGNOSIS — O24.410 DIET CONTROLLED GESTATIONAL DIABETES MELLITUS (GDM) IN THIRD TRIMESTER: ICD-10-CM

## 2023-06-05 DIAGNOSIS — E66.01 CLASS 3 SEVERE OBESITY WITH BODY MASS INDEX (BMI) OF 50.0 TO 59.9 IN ADULT, UNSPECIFIED OBESITY TYPE, UNSPECIFIED WHETHER SERIOUS COMORBIDITY PRESENT: ICD-10-CM

## 2023-06-05 DIAGNOSIS — O10.913 CHRONIC HYPERTENSION COMPLICATING OR REASON FOR CARE DURING PREGNANCY, THIRD TRIMESTER: ICD-10-CM

## 2023-06-05 DIAGNOSIS — Z3A.34 34 WEEKS GESTATION OF PREGNANCY: ICD-10-CM

## 2023-06-05 PROCEDURE — 76819 US OB/GYN PROCEDURE (VIEWPOINT): ICD-10-PCS | Mod: S$GLB,,, | Performed by: OBSTETRICS & GYNECOLOGY

## 2023-06-05 PROCEDURE — 76819 FETAL BIOPHYS PROFIL W/O NST: CPT | Mod: S$GLB,,, | Performed by: OBSTETRICS & GYNECOLOGY

## 2023-06-09 ENCOUNTER — ROUTINE PRENATAL (OUTPATIENT)
Dept: OBSTETRICS AND GYNECOLOGY | Facility: CLINIC | Age: 33
End: 2023-06-09
Payer: OTHER GOVERNMENT

## 2023-06-09 VITALS — WEIGHT: 187.38 LBS | DIASTOLIC BLOOD PRESSURE: 81 MMHG | SYSTOLIC BLOOD PRESSURE: 132 MMHG

## 2023-06-09 DIAGNOSIS — Z3A.38 38 WEEKS GESTATION OF PREGNANCY: Primary | ICD-10-CM

## 2023-06-09 NOTE — PROGRESS NOTES
2023  Chief Complaint   Patient presents with    Routine Prenatal Visit     Pt is for 38 wk OB visit     33 y.o.  at 38+3  Estimated Date of Delivery: 2023, by Last Menstrual Period, dating reviewed.      Patient reports: Good fetal movements reported. No Bleeding or contractions . She is doing well.  She is taking prenatal vitamins. Ms. Kuhn   is adjusting well and has a good support system of family and friends. She is coping with pregnancy and having no difficulty with sleep.    Prenatal labs reviewed and updated today    OB History    Para Term  AB Living   3 1 1   2 1   SAB IAB Ectopic Multiple Live Births   2       1      # Outcome Date GA Lbr Brodie/2nd Weight Sex Delivery Anes PTL Lv   3 Term 23 39w1d  3.629 kg (8 lb) F CS-Unspec   SHIRA   2 SAB            1 SAB                Review of Systems:  General ROS: negative for headache or visual changes  Breast ROS: negative for breast lumps  Gastrointestinal ROS: negative for constipation, diarrhea or nausea/vomiting  Musculoskeletal ROS: negative for pain in joints or swelling in face or hands.   Neurological ROS: negative for - headaches, numbness/tingling or visual changes      Physical Exam:  /81   Wt 85 kg (187 lb 6.4 oz)   LMP 2022     Constitutional: She is oriented to person, place, and time. She appears well-developed and well-nourished. No distress.     Pulmonary/Chest: Effort normal. No respiratory distress  Abdominal: Soft, gravid, nontender. No rebound and no guarding.   Genitourinary: Deferred   Musculoskeletal: Normal range of motion, Minimal peripheral edema.   Neurological: She is alert and oriented to person, place, and time. Coordination normal.   Skin: Skin is warm and dry. She is not diaphoretic.  Psychiatric: She has a normal mood and affect.      Assessment:  Overall doing well.   33 y.o.at 38+3  Patient Active Problem List   Diagnosis    Bilateral ovarian cysts    Endometriosis     Gastroesophageal reflux disease    Supraventricular tachycardia    Moderate episode of recurrent major depressive disorder     Current Outpatient Medications on File Prior to Visit   Medication Sig Dispense Refill    omeprazole (PRILOSEC) 20 MG capsule Take 20 mg by mouth.       No current facility-administered medications on file prior to visit.     38 weeks gestation of pregnancy       Plan:  Overall doing well  Follow up 1 Weeks, bleeding/pain precautions, kick counts, labor precautions discussed.

## 2023-06-12 ENCOUNTER — OUTSIDE PLACE OF SERVICE (OUTPATIENT)
Dept: OBSTETRICS AND GYNECOLOGY | Facility: CLINIC | Age: 33
End: 2023-06-12
Payer: OTHER GOVERNMENT

## 2023-06-12 PROCEDURE — 59510 PR FULL ROUT OBSTE CARE,CESAREAN DELIV: ICD-10-PCS | Mod: ,,, | Performed by: OBSTETRICS & GYNECOLOGY

## 2023-06-12 PROCEDURE — 59510 CESAREAN DELIVERY: CPT | Mod: ,,, | Performed by: OBSTETRICS & GYNECOLOGY

## 2023-06-13 ENCOUNTER — TELEPHONE (OUTPATIENT)
Dept: OBSTETRICS AND GYNECOLOGY | Facility: CLINIC | Age: 33
End: 2023-06-13
Payer: OTHER GOVERNMENT

## 2023-06-13 NOTE — TELEPHONE ENCOUNTER
----- Message from Danya Madden sent at 6/13/2023  8:14 AM CDT -----  Contact: pt  Type: Needs Medical Advice         Who Called: Sneha Avery   Best Call Back Number:[phone]    Additional Information: Requesting a call back regarding  They are needing pt delivery date, if pt was admitted and if there were any complications during delivery     Claim # [id number]    Please Advise- Thank you

## 2023-06-14 ENCOUNTER — TELEPHONE (OUTPATIENT)
Dept: OBSTETRICS AND GYNECOLOGY | Facility: CLINIC | Age: 33
End: 2023-06-14

## 2023-06-14 NOTE — TELEPHONE ENCOUNTER
----- Message from Danya Madden sent at 6/14/2023 10:26 AM CDT -----  Contact: pt  Type: Needs Medical Advice         Who Called: PT  Best Call Back Number:[phone]  Additional Information: Requesting a call back regarding  Pt is needing a post op appt for next week please   Please Advise- Thank you

## 2023-06-19 ENCOUNTER — PATIENT MESSAGE (OUTPATIENT)
Dept: OBSTETRICS AND GYNECOLOGY | Facility: CLINIC | Age: 33
End: 2023-06-19
Payer: OTHER GOVERNMENT

## 2023-07-22 ENCOUNTER — HEALTH MAINTENANCE LETTER (OUTPATIENT)
Age: 33
End: 2023-07-22

## 2023-07-31 ENCOUNTER — POSTPARTUM VISIT (OUTPATIENT)
Dept: OBSTETRICS AND GYNECOLOGY | Facility: CLINIC | Age: 33
End: 2023-07-31
Payer: OTHER GOVERNMENT

## 2023-07-31 VITALS
DIASTOLIC BLOOD PRESSURE: 71 MMHG | BODY MASS INDEX: 48.05 KG/M2 | HEIGHT: 63 IN | WEIGHT: 271.19 LBS | SYSTOLIC BLOOD PRESSURE: 123 MMHG

## 2023-07-31 RX ORDER — ACETAMINOPHEN AND CODEINE PHOSPHATE 120; 12 MG/5ML; MG/5ML
1 SOLUTION ORAL DAILY
Qty: 84 TABLET | Refills: 4 | Status: SHIPPED | OUTPATIENT
Start: 2023-07-31

## 2023-07-31 NOTE — PROGRESS NOTES
"CC: Post-partum follow-up    Patsy Kuhn is a 33 y.o. female  who presents for post-partum visit.  She is S/P  section.  Patient without complaints.  Pain controlled.  Tolerating p.o.  Pain controlled. Healing well. Positive ambulation. Positive flatus.  Bleeding is controlled.  No depression.  No abuse.    Delivery Date: 23  Delivery MD: Dr. Maya  Gender: female  Breast Feeding: breast  Depression: No  Contraception: oral progesterone-only contraceptive  [facility]ility[facility]    Pregnancy was complicated by:  PPROM, PTL, PreE.      Current Outpatient Medications:     prenat.vits,newton,min-iron-folic Tab, , Disp: , Rfl:     aspirin (ECOTRIN) 81 MG EC tablet, Take 81 mg by mouth., Disp: , Rfl:     aspirin 81 mg Cap, , Disp: , Rfl:     buPROPion (WELLBUTRIN XL) 300 MG 24 hr tablet, Take 300 mg by mouth., Disp: , Rfl:     omeprazole (PRILOSEC) 20 MG capsule, Take 20 mg by mouth., Disp: , Rfl:     ondansetron (ZOFRAN ODT ORAL), , Disp: , Rfl:     ondansetron (ZOFRAN) 4 MG tablet, Take 4 mg by mouth every 6 (six) hours as needed., Disp: , Rfl:     ondansetron (ZOFRAN-ODT) 4 MG TbDL, Take 4 mg by mouth., Disp: , Rfl:     promethazine (PHENERGAN) 25 MG suppository, Place 25 mg rectally., Disp: , Rfl:      ROS:  GENERAL: No fever, chills, fatigability.  VULVAR: No pain, no lesions and no itching.  VAGINAL: No relaxation, no itching, no discharge, no abnormal bleeding and no lesions.  ABDOMEN: No abdominal pain. Denies nausea. Denies vomiting. No diarrhea. No constipation  BREAST: Denies pain. No lumps.  URINARY: No incontinence, no nocturia, no frequency and no dysuria.  CARDIOVASCULAR: No chest pain. No shortness of breath. No leg cramps.  NEUROLOGICAL: No headaches. No vision changes.      PHYSICAL EXAM:  /71   Ht 5' 3" (1.6 m)   Wt 123 kg (271 lb 3.2 oz)   LMP 2022   Breastfeeding Yes   BMI 48.04 kg/m²    Exam chaperoned by nurse  General:  Well-developed, " well-nourished female in no acute distress  HEENT:  Normocephalic, atraumatic, extraocular muscles intact  Breasts:  Nontender, no masses, bilaterally symmetric  Abdomen:  Soft, nontender, nondistended, fundus firm and below umbilicus, incision c/d/i  Extremities:  Warm, dry, no clubbing/cyanosis/edema  Neurologic:  Cranial nerves 2-12 grossly intact  Dermatologic:  No rashes/lesions/bruising    IMP:  Status post  section    PLAN:  Doing well.  Depression precautions discussed.  Method of contraception discussed and patient has decided.  Pregnancy spacing discussed.  Patient expressed understanding.  Return for annual exam.

## 2023-09-14 NOTE — LETTER
"  11/4/2020      RE: Alva Joshi  [address] [place] [address]       Alva who is being evaluated via a billable teledermatology visit.             The patient has been notified of following:            \"We have asked you to send in photos via [employer] or e-mail. These photos will be seen and reviewed by an MD or PANeoC.  A telederm visit is not as thorough as an in-person visit, photo assessment does not replace an in-person skin exam.  The quality of the photograph sent may not be of the same quality as that taken by the dermatology clinic. With that being said, we have found that certain health care needs can be provided without the need for a physical exam.  This service lets us provide the care you need with a short phone conversation. If prescriptions are needed we can send directly to your pharmacy.If lab work is needed we can place an order for that and you can then stop by our lab to have the test done at a later time. An MD/PA/Resident will call you around the time of your visit. This may be from a blocked number.     This is a billable visit. If during the course of the call the physician/provider feels a telephone visit is not appropriate, you will not be charged for this service.            Patient has given verbal consent for Telephone visit?  Yes           The patient would like to proceed with an teledermatology because of the COVID Pandemic.     Patient complains of    Derm recheck       ALLERGIES REVIEWED?  y    Pediatric Dermatology- Review of Systems Questions (return patient)          Goal for today's visit? Check up on skin     IN THE LAST 2 WEEKS     Fever- n     Mouth/Throat Sores- n/n     Weight Gain/Loss - n/n     Cough/Wheezing- n/n     Change in Appetite- n     Chest Discomfort/Heartburn - n/n     Bone Pain- n     Nausea/Vomiting - n/n     Joint Pain/Swelling - n/n     Constipation/Diarrhea - n/n     Headaches/Dizziness/Change in Vision- n/n/n     Pain with Urination- n "     Ear Pain/Hearing Loss- n/n     Nasal Discharge/Bleeding- n/n     Sadness/Irritability- n/n     Anxiety/Moodiness-n/n           [facility] ((National Emergency Concerning the CORONAVIRUS (COVID 19), preferred for return patients. )    Image quality and interpretability: acceptable    Physician has received verbal consent for a Video/Photos Visit from the patient? Yes    In-person dermatology visit recommendation: no    Consent has been obtained for this service by 1 care team member: yes.     Teledermatology information:  - Location of patient: Home  - Location of teledermatologist:  ([facility] (Dr. Joseph, [place], [place])  - Reason teledermatology is appropriate:  of National Emergency Regarding Coronavirus disease (COVID 19) Outbreak  - Method of transmission:  Store and Forward ((National Emergency Concerning the CORONAVIRUS (COVID 19), preferred for return patients.   - Date of images: 11/04/20  - Service start time: 0906  - Service end time: 0911   - Date of report: 11/04/20      ___________________________________________________________________________        Pediatric Dermatology Clinic Note      Alva Joshi  29 year old  [id number]    CC: Patient presents with:  teledermatology: teledermatology w/ photo review  f/up    Assessment and Plan:  1. Acne vulgaris:  Acne is inflammatory with post inflammatory pigment changes and scarring. Acne continues to improve. Minimal side effects.   -Goal cumulative dose will be 30006-91941 mg.  -Continue isotretinoin at 80 mg daily  -Cumulative dose is 9800 mg        2. Medication monitoring encounter for isotretinoin:  We discussed/reviewed the potential adverse effects including, but not limited to pseudotumor cerebri, dyslipidemia, LFT abnormalities, dry skin, dry eyes, dry mouth, photosensitivity, myalgias, arthralgias and suicidal ideations.  The risk of severe birth defects with  "pregnancies was also reviewed.  The patient was advised not to give blood or to share his/her medication with others per the iPLEDGE protocol.    Labs normal last on 8/5/20    3. Cheilitis: hydrocortisone 2.5% ointment bid as needed. Vaseline frequently to lips.     4. Seborrheic dermatitis of the scalp: ketoconazole shampoo three times per week and mometasone solution nightly as needed. No issues.     RTC in 1 months by phone    Thank you for involving me in this patient's care.     Johanna Joseph MD  Pediatric Dermatology Staff    CC:       Clinic, [facility]    ______________________________________________________________________    HPI:   Alva Joshi is a 29 year old female presenting for reevaluation of acne on isotretinoin.Continues on 80 mg daily. Notes ongoing redness of the face. Nose dripping  Lips are dry. She notes that she is planning hair dye and wonder if this is OK.        Past treatments: doxycycline, minocycline, spironolactone, OCP, clindamycin lotion.       Past Medical History:   Diagnosis Date     Endometriosis      Gastro-oesophageal reflux disease      Migraines      Numbness and tingling     right shoulder \"Pins and Needles\"     PONV (postoperative nausea and vomiting)        Allergies   Allergen Reactions     Augmentin Shortness Of Breath     Penicillins      Zithromax [Azithromycin] Diarrhea       Current Outpatient Medications   Medication     buPROPion (WELLBUTRIN XL) 300 MG 24 hr tablet     hydrocortisone 2.5 % ointment     ISOtretinoin (ACCUTANE) 40 MG capsule     ISOtretinoin (ACCUTANE) 40 MG capsule     ISOtretinoin (ACCUTANE) 40 MG capsule     ISOtretinoin (ACCUTANE) 40 MG capsule     ketoconazole (NIZORAL) 2 % external shampoo     levonorgestrel (MIRENA) 20 MCG/24HR IUD     metoprolol succinate ER (TOPROL-XL) 50 MG 24 hr tablet     mometasone (ELOCON) 0.1 % external solution     No current facility-administered medications for this visit.        Family History "   Problem Relation Age of Onset     Breast Cancer Maternal Grandmother      Diabetes Paternal Grandmother      Breast Cancer Other         great grandmother, 2 maternal great aunts - dx 30's, no genetic testing     Ovarian Cancer Other         paternal great aunt     Brain Cancer Mother         glioblastoma     Brain Cancer Maternal Uncle        Social Hx:  MA at [employer]    ROS: Negative for fever, weight loss, change in appetite, bone pain/swelling, headaches, vision or hearing problems, cough, nausea, vomiting, diarrhea, or mood changes.     PHYSICAL EXAMINATION:     There were no vitals taken for this visit.  GENERAL:  Well appearing and well nourished, in no acute distress.     NEURO: Normal mentation and speech  PSYCH: Normal mood/affect  EYES:  Clear.  Conjunctivae normal.     SKIN-  Lip xerosis with fissuring  No acneiform lesions.           Johanna Joseph MD   Opzelura Counseling:  I discussed with the patient the risks of Opzelura including but not limited to nasopharngitis, bronchitis, ear infection, eosinophila, hives, diarrhea, folliculitis, tonsillitis, and rhinorrhea.  Taken orally, this medication has been linked to serious infections; higher rate of mortality; malignancy and lymphoproliferative disorders; major adverse cardiovascular events; thrombosis; thrombocytopenia, anemia, and neutropenia; and lipid elevations.

## 2023-09-15 ENCOUNTER — OFFICE VISIT (OUTPATIENT)
Dept: FAMILY MEDICINE | Facility: CLINIC | Age: 33
End: 2023-09-15
Payer: OTHER GOVERNMENT

## 2023-09-15 VITALS
SYSTOLIC BLOOD PRESSURE: 124 MMHG | HEIGHT: 60 IN | WEIGHT: 280 LBS | DIASTOLIC BLOOD PRESSURE: 86 MMHG | HEART RATE: 72 BPM | OXYGEN SATURATION: 96 % | BODY MASS INDEX: 54.97 KG/M2 | TEMPERATURE: 98 F

## 2023-09-15 DIAGNOSIS — F43.20 ADJUSTMENT DISORDER, UNSPECIFIED TYPE: Primary | ICD-10-CM

## 2023-09-15 PROBLEM — F33.1 MODERATE EPISODE OF RECURRENT MAJOR DEPRESSIVE DISORDER: Status: ACTIVE | Noted: 2021-06-10

## 2023-09-15 PROBLEM — I47.10 SUPRAVENTRICULAR TACHYCARDIA: Status: ACTIVE | Noted: 2023-09-15

## 2023-09-15 PROBLEM — K21.9 GASTROESOPHAGEAL REFLUX DISEASE: Status: ACTIVE | Noted: 2023-09-15

## 2023-09-15 PROCEDURE — 99213 PR OFFICE/OUTPT VISIT, EST, LEVL III, 20-29 MIN: ICD-10-PCS | Mod: S$GLB,,, | Performed by: NURSE PRACTITIONER

## 2023-09-15 PROCEDURE — 99213 OFFICE O/P EST LOW 20 MIN: CPT | Mod: S$GLB,,, | Performed by: NURSE PRACTITIONER

## 2023-09-15 RX ORDER — BUPROPION HYDROCHLORIDE 150 MG/1
150 TABLET ORAL DAILY
Qty: 30 TABLET | Refills: 11 | Status: SHIPPED | OUTPATIENT
Start: 2023-09-15 | End: 2024-09-14

## 2023-09-15 NOTE — PROGRESS NOTES
Answers submitted by the patient for this visit:  Review of Systems Questionnaire (Submitted on 9/12/2023)  activity change: No  unexpected weight change: No  neck pain: No  hearing loss: No  rhinorrhea: No  trouble swallowing: No  eye discharge: No  visual disturbance: No  chest tightness: No  wheezing: No  chest pain: No  palpitations: Yes  blood in stool: No  constipation: No  vomiting: No  diarrhea: No  polydipsia: No  polyuria: No  difficulty urinating: No  hematuria: No  menstrual problem: No  dysuria: No  joint swelling: No  arthralgias: No  headaches: No  weakness: No  confusion: No  dysphoric mood: No

## 2023-09-15 NOTE — PATIENT INSTRUCTIONS
[facility] [address]  [facility] [address]  [employer] [facility]  [facility] [place]

## 2023-09-15 NOTE — PROGRESS NOTES
Subjective:    Patient ID: Patsy is a 33 y.o. female.  Chief Complaint: Patsy had concerns including Establish Care and Medication Refill.   Narrative:   Mrs. Kuhn presents to establish care, requests to restart bupropion, previously well tolerated  Mother has end stage glioblastoma, recent death of Father-in-law a few months ago due to CKD, CAD.    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        Review of patient's allergies indicates:   Allergen Reactions    Penicillins Anaphylaxis, Other (See Comments) and Shortness Of Breath     Other reaction(s): Breathing Difficulty    Azithromycin Diarrhea and Other (See Comments)    Penicillin      Objective:      Vitals:    09/15/23 1535   BP: 124/86   Pulse: 72   Temp: 97.7 °F (36.5 °C)     -WEIGHT  Body mass index is 54.68 kg/m².  Physical Exam  Vitals and nursing note reviewed.   Constitutional:       Appearance: Normal appearance.   HENT:      Head: Normocephalic and atraumatic.      Right Ear: Tympanic membrane normal.      Left Ear: Tympanic membrane normal.      Nose: Nose normal.      Mouth/Throat:      Mouth: Mucous membranes are moist.      Pharynx: Oropharynx is clear.   Eyes:      Conjunctiva/sclera: Conjunctivae normal.      Pupils: Pupils are equal, round, and reactive to light.   Cardiovascular:      Rate and Rhythm: Normal rate and regular rhythm.      Pulses: Normal pulses.      Heart sounds: Normal heart sounds.   Pulmonary:      Effort: Pulmonary effort is normal.      Breath sounds: Normal breath sounds.   Abdominal:      General: Abdomen is flat. Bowel sounds are normal.      Palpations: Abdomen is soft.   Musculoskeletal:         General: Normal range of motion.      Cervical back: Normal range of motion and neck supple.   Skin:     General: Skin is warm.      Capillary Refill:  Capillary refill takes less than 2 seconds.   Neurological:      General: No focal deficit present.      Mental Status: She is alert and oriented to person, place, and time.   Psychiatric:         Mood and Affect: Mood normal.         Behavior: Behavior normal.      Comments: Tearful, has had multiple life changes           Assessment and Plan:   1. Adjustment disorder, unspecified type    Other orders  -     buPROPion (WELLBUTRIN XL) 150 MG TB24 tablet; Take 1 tablet (150 mg total) by mouth once daily.  Dispense: 30 tablet; Refill: 11     She is weaning breast feeding. Requests to restart bupropion.  On OCP.  Consider seeing a counselor.  Followup I 2-3 mo or PRN      Answers submitted by the patient for this visit:  Review of Systems Questionnaire (Submitted on 9/12/2023)  activity change: No  unexpected weight change: No  neck pain: No  hearing loss: No  rhinorrhea: No  trouble swallowing: No  eye discharge: No  visual disturbance: No  chest tightness: No  wheezing: No  chest pain: No  palpitations: Yes  blood in stool: No  constipation: No  vomiting: No  diarrhea: No  polydipsia: No  polyuria: No  difficulty urinating: No  hematuria: No  menstrual problem: No  dysuria: No  joint swelling: No  arthralgias: No  headaches: No  weakness: No  confusion: No  dysphoric mood: No

## 2023-11-30 ENCOUNTER — OFFICE VISIT (OUTPATIENT)
Dept: OBSTETRICS AND GYNECOLOGY | Facility: CLINIC | Age: 33
End: 2023-11-30
Payer: OTHER GOVERNMENT

## 2023-11-30 VITALS
BODY MASS INDEX: 54.54 KG/M2 | HEIGHT: 60 IN | DIASTOLIC BLOOD PRESSURE: 80 MMHG | SYSTOLIC BLOOD PRESSURE: 126 MMHG | HEART RATE: 97 BPM | WEIGHT: 277.81 LBS

## 2023-11-30 DIAGNOSIS — Z01.419 ENCOUNTER FOR ANNUAL ROUTINE GYNECOLOGICAL EXAMINATION: Primary | ICD-10-CM

## 2023-11-30 PROCEDURE — 88175 CYTOPATH C/V AUTO FLUID REDO: CPT | Performed by: OBSTETRICS & GYNECOLOGY

## 2023-11-30 PROCEDURE — 99395 PREV VISIT EST AGE 18-39: CPT | Mod: S$GLB,,, | Performed by: OBSTETRICS & GYNECOLOGY

## 2023-11-30 PROCEDURE — 99395 PR PREVENTIVE VISIT,EST,18-39: ICD-10-PCS | Mod: S$GLB,,, | Performed by: OBSTETRICS & GYNECOLOGY

## 2023-11-30 NOTE — PROGRESS NOTES
CC: Well woman exam    Patsy Kuhn is a 33 y.o. female  presents for well woman exam.  LMP: No LMP recorded (exact date)..  No issues, problems, or complaints. Patients last pap was  3 years ago.  Last wellness labs were done 3 years ago. She is a non smoker . Denies any anxiety and depression. She uses a seat belt while riding in the car.  Eating well and exercising.  Yes sexually active.The current method of family planning is OCP (estrogen/progesterone).       Chief Complaint   Patient presents with    Annual Exam     Pt is here for an annual exam visit        Past Medical History:   Diagnosis Date    Anxiety     Depression     GERD (gastroesophageal reflux disease)     SVT (supraventricular tachycardia)      Past Surgical History:   Procedure Laterality Date     SECTION      endometrosis      removal    OVARIAN CYST REMOVAL      x3    SHOULDER SURGERY Right     SINUS SURGERY      WISDOM TOOTH EXTRACTION       Social History     Socioeconomic History    Marital status:    Tobacco Use    Smoking status: Never     Passive exposure: Never    Smokeless tobacco: Never   Substance and Sexual Activity    Alcohol use: Yes    Drug use: Never    Sexual activity: Yes     Partners: Male     Birth control/protection: OCP     Family History   Problem Relation Age of Onset    Breast cancer Maternal Grandmother     Cancer Mother     Ovarian cancer Paternal Aunt     Cancer Maternal Grandfather     Cancer Paternal Grandmother     Cancer Maternal Uncle      OB History          3    Para   1    Term   1            AB   2    Living   1         SAB   2    IAB        Ectopic        Multiple        Live Births   1               Current Outpatient Medications on File Prior to Visit   Medication Sig Dispense Refill    buPROPion (WELLBUTRIN XL) 150 MG TB24 tablet Take 1 tablet (150 mg total) by mouth once daily. 30 tablet 11    norethindrone (MICRONOR) 0.35 mg tablet Take 1 tablet (0.35 mg total) by  mouth once daily. 84 tablet 4    omeprazole (PRILOSEC) 20 MG capsule Take 20 mg by mouth.       No current facility-administered medications on file prior to visit.        ROS:  Review of Systems   Constitutional:  Negative for fatigue, fever and unexpected weight change.   HENT:  Negative for nasal congestion.    Respiratory:  Negative for cough and shortness of breath.    Cardiovascular:  Negative for chest pain, palpitations and leg swelling.   Gastrointestinal:  Negative for abdominal pain, constipation, diarrhea, nausea, vomiting and reflux.   Endocrine: Negative for diabetes, hair loss and hot flashes.   Genitourinary:  Negative for bladder incontinence, decreased libido, dysmenorrhea, dyspareunia, dysuria, hot flashes, menorrhagia, menstrual problem, pelvic pain, vaginal discharge and vaginal dryness.   Musculoskeletal:  Negative for arthralgias, back pain and myalgias.   Integumentary:  Negative for rash, acne, hair changes, mole/lesion, breast mass, nipple discharge, breast skin changes and breast tenderness.   Neurological:  Negative for seizures, syncope and headaches.   Hematological:  Negative for adenopathy. Does not bruise/bleed easily.   Psychiatric/Behavioral:  Negative for depression and sleep disturbance. The patient is not nervous/anxious.    Breast: Negative for breast self exam, lump, mass, mastodynia, nipple discharge, skin changes and tenderness       /80   Pulse 97   Ht 5' (1.524 m)   Wt 126 kg (277 lb 12.8 oz)   LMP  (Exact Date) Comment: a week ago  Breastfeeding No Comment: formula  BMI 54.25 kg/m²     PHYSICAL EXAM:  Physical Exam:   Constitutional: She is oriented to person, place, and time. She appears well-developed and well-nourished. No distress.    HENT:   Head: Normocephalic and atraumatic.   Nose: Nose normal.    Eyes: Pupils are equal, round, and reactive to light. Conjunctivae and EOM are normal.    Neck: No thyromegaly present.    Cardiovascular:  Normal rate,  regular rhythm and normal heart sounds.      Exam reveals no clubbing, no cyanosis and no edema.        Pulmonary/Chest: Effort normal and breath sounds normal. She has no wheezes. Right breast exhibits no inverted nipple, no mass, no nipple discharge and no bleeding. Left breast exhibits no inverted nipple, no mass, no nipple discharge and no bleeding. Breasts are symmetrical.        Abdominal: Soft. Bowel sounds are normal. She exhibits no distension and no mass. There is no abdominal tenderness. There is no rebound and no guarding.     Genitourinary:    Inguinal canal, vagina, uterus, right adnexa and left adnexa normal.     Labial bartholins normal.There is no rash or lesion on the right labia. There is no rash or lesion on the left labia. Cervix is normal. Right adnexum displays no mass and no tenderness. Left adnexum displays no mass and no tenderness. No vaginal discharge, tenderness, rectocele, cystocele or prolapse of vaginal walls in the vagina. Cervix exhibits no motion tenderness and no discharge.           Musculoskeletal: Normal range of motion and moves all extremeties. No edema.       Neurological: She is alert and oriented to person, place, and time. She has normal reflexes.    Skin: Skin is warm and dry. No rash noted. No cyanosis. Nails show no clubbing.    Psychiatric: She has a normal mood and affect. Her behavior is normal. Judgment and thought content normal.        Encounter for annual routine gynecological examination  -     Liquid-Based Pap Smear, Screening      Patient was counseled today on A.C.S. Pap guidelines and recommendations for yearly pelvic exams, mammograms and monthly self breast exams; to see her PCP for other health maintenance.  Contraception was discussed with the patient she expressed understanding.  STD education counseling was performed during this visit patient expressed understanding.    No follow-ups on file.

## 2023-12-07 LAB
FINAL PATHOLOGIC DIAGNOSIS: NORMAL
Lab: NORMAL

## 2023-12-21 ENCOUNTER — PATIENT MESSAGE (OUTPATIENT)
Dept: OBSTETRICS AND GYNECOLOGY | Facility: CLINIC | Age: 33
End: 2023-12-21
Payer: OTHER GOVERNMENT

## 2023-12-27 ENCOUNTER — MEDICAL CORRESPONDENCE (OUTPATIENT)
Dept: HEALTH INFORMATION MANAGEMENT | Facility: CLINIC | Age: 33
End: 2023-12-27
Payer: OTHER GOVERNMENT

## 2024-01-02 ENCOUNTER — ANCILLARY PROCEDURE (OUTPATIENT)
Dept: ULTRASOUND IMAGING | Facility: CLINIC | Age: 34
End: 2024-01-02
Attending: OBSTETRICS & GYNECOLOGY
Payer: OTHER GOVERNMENT

## 2024-01-02 ENCOUNTER — VIRTUAL VISIT (OUTPATIENT)
Dept: OBGYN | Facility: CLINIC | Age: 34
End: 2024-01-02
Payer: OTHER GOVERNMENT

## 2024-01-02 DIAGNOSIS — O20.9 BLEEDING IN EARLY PREGNANCY: ICD-10-CM

## 2024-01-02 DIAGNOSIS — R11.2 PONV (POSTOPERATIVE NAUSEA AND VOMITING): ICD-10-CM

## 2024-01-02 DIAGNOSIS — R20.2 NUMBNESS AND TINGLING: ICD-10-CM

## 2024-01-02 DIAGNOSIS — Z98.890 PONV (POSTOPERATIVE NAUSEA AND VOMITING): ICD-10-CM

## 2024-01-02 DIAGNOSIS — F32.A DEPRESSIVE DISORDER: Primary | ICD-10-CM

## 2024-01-02 DIAGNOSIS — N20.0 KIDNEY STONE: ICD-10-CM

## 2024-01-02 DIAGNOSIS — R20.0 NUMBNESS AND TINGLING: ICD-10-CM

## 2024-01-02 PROBLEM — K21.9 GASTROESOPHAGEAL REFLUX DISEASE: Status: ACTIVE | Noted: 2023-09-15

## 2024-01-02 PROBLEM — O21.9 NAUSEA AND VOMITING DURING PREGNANCY: Status: ACTIVE | Noted: 2023-03-08

## 2024-01-02 PROBLEM — O03.9 SPONTANEOUS ABORTION: Status: ACTIVE | Noted: 2022-08-30

## 2024-01-02 PROBLEM — I47.10 SUPRAVENTRICULAR TACHYCARDIA (H): Status: ACTIVE | Noted: 2023-09-15

## 2024-01-02 PROBLEM — E86.0 DEHYDRATION: Status: ACTIVE | Noted: 2023-03-22

## 2024-01-02 PROCEDURE — 76817 TRANSVAGINAL US OBSTETRIC: CPT

## 2024-01-02 PROCEDURE — 76817 TRANSVAGINAL US OBSTETRIC: CPT | Mod: 26 | Performed by: OBSTETRICS & GYNECOLOGY

## 2024-01-02 RX ORDER — BUPROPION HYDROCHLORIDE 150 MG/1
TABLET ORAL
COMMUNITY

## 2024-01-02 NOTE — PROGRESS NOTES
"K[facility]INA RN Prenatal Intake note  Subjective     33 year old female newly pregnant.  LMP: 11/15/2023 (exact)  Pregnancy is unplanned but welcomed.    Partner/support person name and relationship -  Jon (will be deployed in April).        Symptoms since LMP include spotting, cramping, and fatigue. Patient has tried these relief measures: diet modification, small frequent meals, increased rest, and increased fluids.    Patient reports a light amount of brown spotting this AM (only present on toilet paper after using the bathroom). Does endorse having to lift her mother who is on hospice. US ordered and scheduled for this afternoon per clinic protocol.     Of note, patient had hyperemesis gravidarum with last pregnancy and required IV fluids. Patient states this began around 8 weeks last pregnancy, and states she has not had n/v yet this pregnancy.     OB HISTORY  OB History    Para Term  AB Living   4 1 1 0 2 1   SAB IAB Ectopic Multiple Live Births   2 0 0 0 1      # Outcome Date GA Lbr Juan/2nd Weight Sex Delivery Anes PTL Lv   4 Current            3 Term 23 39w1d  3.629 kg (8 lb) F CS-Unspec   ROSEY   2 SAB            1 SAB                OB COMPLICATIONS  First Pregnancy No  GDM Yes: was diagnosed two weeks after starting prednisone, all finger sticks less than 140, and fasting was less than 100  HTN Yes: Was dx with chronic HTN around 10 weeks, was started on baby aspirin, blood pressures stabilized themselves later in pregnancy   Preeclampsia No   labor No   birth No   birth Yes: d/t failure to progress in labor (see scanned operative notes in care everywhere from 2023)  PP hemorrhage No  Retained placenta No  PP mood disorder No  Fetal anomaly No  FGR No  Macrosomia No  3rd or 4th degree laceration  No  Shoulder dystocia No  NICU admit Yes: had to go to \"sick baby nursery\" for antibiotics for GBS + (patient is allergic to PCNS)    PERSONAL/SOCIAL HISTORY " "  marriedand lives with their family.  Employment: Full time as a peds cardiology nurse care coordinator. Job involves light activity.  Her partner works as a officer in [employer].    MENTAL HEALTH HISTORY:  depression and current medication    - Current Medications   Current Outpatient Medications   Medication Sig Dispense Refill    buPROPion (WELLBUTRIN XL) 150 MG 24 hr tablet       Prenatal Vit-Fe Fumarate-FA (PRENATAL MULTIVITAMIN  PLUS IRON) 27-1 MG TABS Take by mouth daily             - Co-morbids    Past Medical History:   Diagnosis Date    Dehydration 2023    Depressive disorder     In high school when i moved from [place] to [place].    Endometriosis     Family history of malignant neoplasm of breast 2020    Asked Alva to discuss HBOC testing with her affected family members. If they are unable/unwilling to test, will refer Alva to genetic counselor so that we can determine if testing is warranted and if earlier breast cancer screening is appropriate.    Gastroesophageal reflux disease 09/15/2023    Kidney stone 2020    Migraines     Moderate episode of recurrent major depressive disorder (H) 06/10/2021    Morbid obesity (H) 03/10/2020    Nausea and vomiting during pregnancy 2023    Numbness and tingling     right shoulder \"Pins and Needles\"    PONV (postoperative nausea and vomiting)     Routine general medical examination at a health care facility 2020    Age 19-39 Annual Preventive Exam  1.  Screening:  STD testing: declined today  Cervical cancer: last pap  NIL; repeat performed today; repeat again in  if normal              Breast cancer: CBE today normal, repeat annually. Referral to cancer management center for evaluation of risk factors and need for early screening as has grandmother and great-grandmother with early-onset breast cance    Spontaneous  2022    Supraventricular tachycardia 09/15/2023    x1 episdoe in pregancy and x1 episode after epidural    " Viral URI with cough 06/10/2021     - Genetic/Infection questionnaire completed, risks include congenital heart defect. Discussed genetic screening options, patient does desire first trimester genetic screening  Pt  does not have a recent known exposure to Parvo or CMV so IgG/IgM testing WILL NOT be ordered.   Flu Vaccine: would like flu shot at next appointment   COVID Vaccine: First two dose series and one booster in 2022  -Reconciled and reviewed problem list  -Pt scheduled for dating US and NOB on 01/24/2024    Petra Bello RN

## 2024-01-02 NOTE — PATIENT INSTRUCTIONS
Thank you for trusting us with your care!     If you need to contact us for questions about:  Symptoms, Scheduling & Medical Questions; Non-urgent (2-3 day response) Libertadavinashbharathi message, Urgent (needing response today) [phone] (if after 3:30pm next day response)   Prescriptions: Please call your Pharmacy   Billing: [facility] [phone] or [facility]:[phone]    Learning About Pregnancy  Your Care Instructions     Your health in the early weeks of your pregnancy is particularly important for your baby's health. Take good care of yourself. Anything you do that harms your body can also harm your baby.  Make sure to go to all of your doctor appointments. Regular checkups will help keep you and your baby healthy.  How can you care for yourself at home?  Diet    Eat a balanced diet. Make sure your diet includes plenty of beans, peas, and leafy green vegetables.     Do not skip meals or go for many hours without eating. If you are nauseated, try to eat a small, healthy snack every 2 to 3 hours.     Do not eat fish that has a high level of mercury, such as shark, swordfish, or mackerel. Do not eat more than one can of tuna each week.     Drink plenty of fluids. If you have kidney, heart, or liver disease and have to limit fluids, talk with your doctor before you increase the amount of fluids you drink.     Cut down on caffeine, such as coffee, tea, and cola.     Do not drink alcohol, such as beer, wine, or hard liquor.     Take a multivitamin that contains at least 400 micrograms (mcg) of folic acid to help prevent birth defects. Fortified cereal and whole wheat bread are good additional sources of folic acid.     Increase the calcium in your diet. Try to drink a quart of skim milk each day. You may also take calcium supplements and choose foods such as cheese and yogurt.   Lifestyle    Make sure you go to your follow-up appointments.     Get plenty of rest. You may be unusually tired while you are pregnant.     Get  at least 30 minutes of exercise on most days of the week. Walking is a good choice. If you have not exercised in the past, start out slowly. Take several short walks each day.     Do not smoke. If you need help quitting, talk to your doctor about stop-smoking programs. These can increase your chances of quitting for good.     Do not touch cat feces or litter boxes. Also, wash your hands after you handle raw meat, and fully cook all meat before you eat it. Wear gloves when you work in the yard or garden, and wash your hands well when you are done. Cat feces, raw or undercooked meat, and contaminated dirt can cause an infection that may harm your baby or lead to a miscarriage.     Avoid things that can make your body too hot and may be harmful to your baby, such as a hot tub or sauna. Or talk with your doctor before doing anything that raises your body temperature. Your doctor can tell you if it's safe.     Avoid chemical fumes, paint fumes, or poisons.     Do not use illegal drugs, marijuana, or alcohol.   Medicines    Review all of your medicines with your doctor. Some of your routine medicines may need to be changed to protect your baby.     Use acetaminophen (Tylenol) to relieve minor problems, such as a mild headache or backache or a mild fever with cold symptoms. Do not use nonsteroidal anti-inflammatory drugs (NSAIDs), such as ibuprofen (Advil, Motrin) or naproxen (Aleve), unless your doctor says it is okay.     Do not take two or more pain medicines at the same time unless the doctor told you to. Many pain medicines have acetaminophen, which is Tylenol. Too much acetaminophen (Tylenol) can be harmful.     Take your medicines exactly as prescribed. Call your doctor if you think you are having a problem with your medicine.   To manage morning sickness    If you feel sick when you first wake up, try eating a small snack (such as crackers) before you get out of bed. Allow some time to digest the snack, and then  "get out of bed slowly.     Do not skip meals or go for long periods without eating. An empty stomach can make nausea worse.     Eat small, frequent meals instead of three large meals each day.     Drink plenty of fluids.     Eat foods that are high in protein but low in fat.     If you are taking iron supplements, ask your doctor if they are necessary. Iron can make nausea worse.     Avoid any smells, such as coffee, that make you feel sick.     Get lots of rest. Morning sickness may be worse when you are tired.   Follow-up care is a key part of your treatment and safety. Be sure to make and go to all appointments, and call your doctor if you are having problems. It's also a good idea to know your test results and keep a list of the medicines you take.  Where can you learn more?  Go to https://www.iDreamBooks.net/patiented  Enter E868 in the search box to learn more about \"Learning About Pregnancy.\"  Current as of: July 11, 2023               Content Version: 13.8    9381-7587 [employer].   Care instructions adapted under license by your healthcare professional. If you have questions about a medical condition or this instruction, always ask your healthcare professional. [employer] disclaims any warranty or liability for your use of this information.      Weeks 6 to 10 of Your Pregnancy: Care Instructions  During these weeks of pregnancy, your body goes through many changes. You may start to feel different, both in your body and your emotions. Each pregnancy is different, so there's no \"right\" way to feel. These early weeks are a time to make healthy choices for you and your pregnancy.    Take a daily prenatal vitamin. Choose one with folic acid in it.   Avoid alcohol, tobacco, and drugs (including marijuana). If you need help quitting, talk to your doctor.     Drink plenty of liquids.  Be sure to drink enough water. And limit sodas, other sweetened drinks, and caffeine.     Choose foods " "that are good sources of calcium, iron, and folate.  You can try dairy products, dark leafy greens, fortified orange juice and cereals, almonds, broccoli, dried fruit, and beans.     Avoid foods that may be harmful.  Don't eat raw meat, deli meat, raw seafood, or raw eggs. Avoid soft cheese and unpasteurized dairy, like Brie and blue cheese. And don't eat fish that contains a lot of mercury, like shark and swordfish.     Don't touch ashlee litter or cat poop.  They can cause an infection that could be harmful during pregnancy.     Avoid things that can make your body too hot.  For example, avoid hot tubs and saunas.     Soothe morning sickness.  Try eating 5 or 6 small meals a day, getting some fresh air, or using angeli to control symptoms.     Ask your doctor about flu and COVID-19 shots.  Getting them can help protect against infection.   Follow-up care is a key part of your treatment and safety. Be sure to make and go to all appointments, and call your doctor if you are having problems. It's also a good idea to know your test results and keep a list of the medicines you take.  Where can you learn more?  Go to https://www.BA Systems.net/patiented  Enter G112 in the search box to learn more about \"Weeks 6 to 10 of Your Pregnancy: Care Instructions.\"  Current as of: July 11, 2023               Content Version: 13.8    2814-2812 [email or web address].   Care instructions adapted under license by your healthcare professional. If you have questions about a medical condition or this instruction, always ask your healthcare professional. [email or web address] disclaims any warranty or liability for your use of this information.         Managing Morning Sickness (01:55)  Your health professional recommends that you watch this short online health video.  Learn tips for dealing with morning sickness, no matter what time of day you have it.  Purpose:  Gives tips for managing morning sickness, including eating small " low-fat meals and avoiding caffeine and spicy food.  Goal:  The user will learn tips for dealing with morning sickness during pregnancy.     How to watch the video    Scan the QR code   OR Visit the website    https://link.AddIn Social.MobileIron/r/Akvzocn0oldxe   Current as of: July 11, 2023               Content Version: 13.8    9689-2183 [employer].   Care instructions adapted under license by your healthcare professional. If you have questions about a medical condition or this instruction, always ask your healthcare professional. [employer] disclaims any warranty or liability for your use of this information.      Pregnancy and Heartburn: Care Instructions  Overview     Heartburn is a common problem during pregnancy.  Heartburn happens when stomach acid backs up into the tube that carries food to the stomach. This tube is called the esophagus. Early in pregnancy, heartburn is caused by hormone changes that slow down digestion. Later on, it's also caused by the large uterus pushing up on the stomach.  Even though you can't fix the cause, there are things you can do to get relief. Treating heartburn during pregnancy focuses first on making lifestyle changes, like changing what and how you eat, and on taking medicines.  Heartburn usually improves or goes away after childbirth.  Follow-up care is a key part of your treatment and safety. Be sure to make and go to all appointments, and call your doctor if you are having problems. It's also a good idea to know your test results and keep a list of the medicines you take.  How can you care for yourself at home?  Eat small, frequent meals.  Avoid foods that make your symptoms worse, such as chocolate, peppermint, and spicy foods. Avoid drinks with caffeine, such as coffee, tea, and sodas.  Avoid bending over or lying down after meals.  Take a short walk after you eat.  If heartburn is a problem at night, do not eat for 2 hours before bedtime.  Take  "antacids like Mylanta, Maalox, Rolaids, or Tums. Do not take antacids that have sodium bicarbonate, magnesium trisilicate, or aspirin. Be careful when you take over-the-counter antacid medicines. Many of these medicines have aspirin in them. While you are pregnant, do not take aspirin or medicines that contain aspirin unless your doctor says it is okay.  If you're not getting relief, talk to your doctor. You may be able to take a stronger acid-reducing medicine.  When should you call for help?   Call your doctor now or seek immediate medical care if:    You have new or worse belly pain.     You are vomiting.   Watch closely for changes in your health, and be sure to contact your doctor if:    You have new or worse symptoms of reflux.     You are losing weight.     You have trouble or pain swallowing.     You do not get better as expected.   Where can you learn more?  Go to https://www.Freak'n Genius.GrouPAY/patiented  Enter U946 in the search box to learn more about \"Pregnancy and Heartburn: Care Instructions.\"  Current as of: July 11, 2023               Content Version: 13.8    9411-1997 [employer].   Care instructions adapted under license by your healthcare professional. If you have questions about a medical condition or this instruction, always ask your healthcare professional. [employer] disclaims any warranty or liability for your use of this information.      Constipation: Care Instructions  Overview     Constipation means that you have a hard time passing stools (bowel movements). People pass stools from 3 times a day to once every 3 days. What is normal for you may be different. Constipation may occur with pain in the rectum and cramping. The pain may get worse when you try to pass stools. Sometimes there are small amounts of bright red blood on toilet paper or the surface of stools. This is because of enlarged veins near the rectum (hemorrhoids).  A few changes in your diet and " lifestyle may help you avoid ongoing constipation. Your doctor may also prescribe medicine to help loosen your stool.  Some medicines can cause constipation. These include pain medicines and antidepressants. Tell your doctor about all the medicines you take. Your doctor may want to make a medicine change to ease your symptoms.  Follow-up care is a key part of your treatment and safety. Be sure to make and go to all appointments, and call your doctor if you are having problems. It's also a good idea to know your test results and keep a list of the medicines you take.  How can you care for yourself at home?  Drink plenty of fluids. If you have kidney, heart, or liver disease and have to limit fluids, talk with your doctor before you increase the amount of fluids you drink.  Include high-fiber foods in your diet each day. These include fruits, vegetables, beans, and whole grains.  Get at least 30 minutes of exercise on most days of the week. Walking is a good choice. You also may want to do other activities, such as running, swimming, cycling, or playing tennis or team sports.  Take a fiber supplement, such as Citrucel or Metamucil, every day. Read and follow all instructions on the label.  Schedule time each day for a bowel movement. A daily routine may help. Take your time having a bowel movement, but don't sit for more than 10 minutes at a time. And don't strain too much.  Support your feet with a small step stool when you sit on the toilet. This helps flex your hips and places your pelvis in a squatting position.  Your doctor may recommend an over-the-counter laxative to relieve your constipation. Examples are Milk of Magnesia and MiraLax. Read and follow all instructions on the label. Do not use laxatives on a long-term basis.  When should you call for help?   Call your doctor now or seek immediate medical care if:    You have new or worse belly pain.     You have new or worse nausea or vomiting.     You have  "blood in your stools.   Watch closely for changes in your health, and be sure to contact your doctor if:    Your constipation is getting worse.     You do not get better as expected.   Where can you learn more?  Go to https://www.Iron Will Innovations.net/patiented  Enter P343 in the search box to learn more about \"Constipation: Care Instructions.\"  Current as of: March 21, 2023               Content Version: 13.8    4720-7872 [employer].   Care instructions adapted under license by your healthcare professional. If you have questions about a medical condition or this instruction, always ask your healthcare professional. [employer] disclaims any warranty or liability for your use of this information.      Learning About High-Iron Foods  What foods are high in iron?     The foods you eat contain nutrients, such as vitamins and minerals. Iron is a nutrient. Your body needs the right amount to stay healthy and work as it should. You can use the list below to help you make choices about which foods to eat.  Here are some foods that contain iron. They have 1 to 2 milligrams of iron per serving.  Fruits  Figs (dried), 5 figs  Vegetables  Asparagus (canned), 6 dash  Luis Fernando, beet, Swiss chard, or turnip greens, 1 cup  Dried peas, cooked,   cup  Seaweed, spirulina (dried),   cup  Spinach, (cooked)   cup or (raw) 1 cup  Grains  Cereals, fortified with iron, 1 cup  Grits (instant, cooked), fortified with iron,   cup  Meats and other protein foods  Beans (kidney, lima, navy, white), canned or cooked,   cup  Beef or lamb, 3 oz  Chicken giblets, 3 oz  Chickpeas (garbanzo beans),   cup  Liver of beef, lamb, or pork, 3 oz  Oysters (cooked), 3 oz  Sardines (canned), 3 oz  Soybeans (boiled),   cup  Tofu (firm),   cup  Work with your doctor to find out how much of this nutrient you need. Depending on your health, you may need more or less of it in your diet.  Where can you learn more?  Go to " "https://www.ParkAround.com.net/patiented  Enter R005 in the search box to learn more about \"Learning About High-Iron Foods.\"  Current as of: February 28, 2023               Content Version: 13.8 2006-2023 [employer].   Care instructions adapted under license by your healthcare professional. If you have questions about a medical condition or this instruction, always ask your healthcare professional. [employer] disclaims any warranty or liability for your use of this information.      Rh Antibodies Screening During Pregnancy: About This Test  What is it?     The Rh antibodies screening test is a blood test. It checks your blood for Rh antibodies. If you have Rh-negative blood and have been exposed to Rh-positive blood, your immune system may make antibodies to attack the Rh-positive blood. When a pregnant woman has these antibodies, it is called Rh sensitization.  Why is this test done?  The Rh antibodies screening test is done during pregnancy to find out if your baby is at risk for Rh disease. This can happen if you have Rh-negative blood and your baby has Rh-positive blood. If your Rh-negative blood mixes with Rh-positive blood, your immune system will make antibodies to attack the Rh-positive blood.  During pregnancy, these antibodies could attach to the baby's red blood cells. This can cause your baby to have serious health problems. The results of this test will help your doctor know how to best care for you and your baby during your pregnancy.  How do you prepare for the test?  In general, there's nothing you have to do before this test, unless your doctor tells you to.  How is the test done?  A health professional uses a needle to take a blood sample, usually from the arm.  What happens after the test?  You will probably be able to go home right away. It depends on the reason for the test.  You can go back to your usual activities right away.  Follow-up care is a key part of your " "treatment and safety. Be sure to make and go to all appointments, and call your doctor if you are having problems. It's also a good idea to keep a list of the medicines you take. Ask your doctor when you can expect to have your test results.  Where can you learn more?  Go to https://www.FFWD.net/patiented  Enter P722 in the search box to learn more about \"Rh Antibodies Screening During Pregnancy: About This Test.\"  Current as of: 2023               Content Version: 13.8    9128-6586 [employer].   Care instructions adapted under license by your healthcare professional. If you have questions about a medical condition or this instruction, always ask your healthcare professional. [employer] disclaims any warranty or liability for your use of this information.      Learning About Preventing Rh Disease  What is Rh disease?     Rh disease can be a serious problem in pregnancy. It happens when substances called antibodies in the mother's blood cause red blood cells in her baby's blood to be destroyed. This can occur when the blood types of a mother and her baby do not match.  All blood has an Rh factor. This is what makes a blood type positive or negative. When you are Rh-negative, your baby may be Rh-negative or Rh-positive. If your baby has Rh-positive blood and it mixes with yours, your body will make antibodies. This is called Rh sensitization.  Most of the time, this is not a problem in a first pregnancy. But in future pregnancies, it could cause Rh disease.  A  with Rh disease has mild anemia and may have jaundice. In severe cases, anemia, jaundice, and swelling can be very dangerous or fatal. Some babies need to be delivered early. Some need special care in the NICU. A very sick baby will need a blood transfusion before or after birth.  Fortunately, Rh sensitization is usually easy to prevent.  That's why it's important to get your Rh status checked in your first " "trimester. It doesn't cause any warning signs. A blood test is the only way to know if you are Rh-sensitive or are at risk for it.  How can you prevent Rh disease?  If you are Rh-negative, your doctor gives you an Rh immune globulin shot (such as RhoGAM). It helps prevent your body from making the antibodies that attack your baby's red blood cells.  Timing is important. You need the shot at certain times during your pregnancy. And you need one anytime there is a chance that your baby's blood might mix with yours. That can happen with certain prenatal tests or when you have pregnancy bleeding, such as:  Right after any pregnancy loss, amniocentesis, or CVS testing.  After turning of a breech baby.  Before and maybe after childbirth. Your doctor gives you a shot around week 28. If your  is Rh-positive, you will have another shot.  Follow-up care is a key part of your treatment and safety. Be sure to make and go to all appointments, and call your doctor if you are having problems. It's also a good idea to know your test results and keep a list of the medicines you take.  Where can you learn more?  Go to https://www.BOKU.net/patiented  Enter W177 in the search box to learn more about \"Learning About Preventing Rh Disease.\"  Current as of: 2023               Content Version: 13.8    0748-8586 [employer].   Care instructions adapted under license by your healthcare professional. If you have questions about a medical condition or this instruction, always ask your healthcare professional. [employer] disclaims any warranty or liability for your use of this information.      Learning About Rh Immunoglobulin Shots  Introduction     An Rh immunoglobulin shot is given to pregnant women who have Rh-negative blood.  You may have Rh-negative blood, and your baby may have Rh-positive blood. If the two types of blood mix, your body will make antibodies. This is called Rh " sensitization. Most of the time, this is not a problem the first time you're pregnant. But it could cause problems in future pregnancies.  This shot keeps your body from making the antibodies. You get the shot around 28 weeks of pregnancy. After the birth, your baby's blood is tested. If the blood is Rh positive, you will get another shot. You may also get the shot if you have vaginal bleeding while you are pregnant or if you have a miscarriage. These shots protect future pregnancies.  Women with Rh negative blood will need this shot each time they get pregnant.  Example  Rh immunoglobulin (HypRho-D, MICRhoGAM, and RhoGAM)  Possible side effects  Rare side effects may include:  Some mild pain where you got the shot.  A slight fever.  An allergic reaction.  You may have other side effects not listed here. Check the information that comes with your medicine.  What to know about taking this medicine  You may need more than one shot. You may need the shot again:  After amniocentesis, fetal blood sampling, or chorionic villus sampling tests.  If you have bleeding in your second or third trimester.  After turning of a breech baby.  After an injury to the belly while you are pregnant.  After a miscarriage or an .  Before or right after treatment for an ectopic or a partial molar pregnancy.  Tell your doctor if you have any allergies or have had a bad response to medicines in the past.  If you get this shot within 3 months of getting a live-virus vaccine, the vaccine may not work. Your doctor will tell you if you need more vaccine.  Check with your doctor or pharmacist before you use any other medicines. This includes over-the-counter medicines. Make sure your doctor knows all of the medicines, vitamins, herbs, and supplements you take. Taking some medicines at the same time can cause problems.  Where can you learn more?  Go to https://www.healthwise.net/patiented  Enter V615 in the search box to learn more about  "\"Learning About Rh Immunoglobulin Shots.\"  Current as of: July 11, 2023               Content Version: 13.8    9837-9991 [employer].   Care instructions adapted under license by your healthcare professional. If you have questions about a medical condition or this instruction, always ask your healthcare professional. [employer] disclaims any warranty or liability for your use of this information.      Rubella ([place] Measles): Care Instructions  Overview  Rubella, also called [place] measles or 3-day measles, is a disease caused by a virus. It spreads by coughs, sneezes, and close contact. Rubella usually is mild and does not cause long-term problems. But if you are pregnant and get it, you can give the disease to your unborn baby. This can cause serious birth defects.  While you have rubella, you may get a rash and a mild fever, and the lymph glands in your neck may swell. Older children often have a fever, eye pain, a sore throat, and body aches. You can relieve most symptoms with care at home. Avoid being around others, especially pregnant people, until your rash has been gone for at least 4 days. People who have not had this disease before or have not had the vaccine have the greatest chance of getting the virus.  Follow-up care is a key part of your treatment and safety. Be sure to make and go to all appointments, and call your doctor if you are having problems. It's also a good idea to know your test results and keep a list of the medicines you take.  How can you care for yourself at home?  Drink plenty of fluids. If you have kidney, heart, or liver disease and have to limit fluids, talk with your doctor before you increase the amount of fluids you drink.  Get plenty of rest to help your body heal.  Take an over-the-counter pain medicine, such as acetaminophen (Tylenol), ibuprofen (Advil, Motrin), or naproxen (Aleve), to reduce fever and discomfort. Read and follow all instructions on " "the label. Do not give aspirin to anyone younger than 20. It has been linked to Reye syndrome, a serious illness.  Do not take two or more pain medicines at the same time unless the doctor told you to. Many pain medicines have acetaminophen, which is Tylenol. Too much acetaminophen (Tylenol) can be harmful.  Try not to scratch the rash. Put cold, wet cloths on the rash to reduce itching.  Do not smoke. Smoking can make your symptoms worse. If you need help quitting, talk to your doctor about stop-smoking programs and medicines. These can increase your chances of quitting for good.  Avoid contact with people who have never had rubella and who have not been immunized.  When should you call for help?   Call your doctor now or seek immediate medical care if:    You have a fever with a stiff neck or a severe headache.     You are sensitive to light or feel very sleepy or confused.   Watch closely for changes in your health, and be sure to contact your doctor if:    You do not get better as expected.   Where can you learn more?  Go to https://www.[employer]UsLessRx.com.net/patiented  Enter B812 in the search box to learn more about \"Rubella (Barbadian Measles): Care Instructions.\"  Current as of: June 13, 2023               Content Version: 13.8    3482-2522 [employer].   Care instructions adapted under license by your healthcare professional. If you have questions about a medical condition or this instruction, always ask your healthcare professional. "[employer] disclaims any warranty or liability for your use of this information.      Gonorrhea and Chlamydia: About These Tests  What is it?  These tests use a sample of urine or other body fluid to look for the bacteria that cause these sexually transmitted infections (STIs). The fluid sample can come from the cervix, vagina, rectum, throat, or eyes.  Why is this test done?  These tests may be done to:  Find out if symptoms are caused by gonorrhea or " "chlamydia.  Check people who are at high risk of being infected with gonorrhea or chlamydia.  Retest people several months after they have been treated for gonorrhea or chlamydia.  Check for infection in your  if you had a gonorrhea or chlamydia infection at the time of delivery.  How can you prepare for the test?  If you are going to have a urine test, do not urinate for at least 1 hour before the test.  If you think you may have chlamydia or gonorrhea, don't have sexual intercourse until you get your test results. And you may want to have tests for other STIs, such as HIV.  How is the test done?  For a direct sample, a swab is used to collect body fluid from the cervix, vagina, rectum, throat, or eyes. Your doctor may collect the sample. Or you may be given instructions on how to collect your own sample.  For a urine sample, you will collect the urine that comes out when you first start to urinate. Don't wipe the genital area clean before you urinate.  How long does the test take?  The test will take a few minutes.  What happens after the test?  You will be able to go home right away.  You can go back to your usual activities right away.  If you do have an infection, don't have sexual intercourse for 7 days after you start treatment. And your sex partner(s) should also be treated.  Follow-up care is a key part of your treatment and safety. Be sure to make and go to all appointments, and call your doctor if you are having problems. It's also a good idea to keep a list of the medicines you take. Ask your doctor when you can expect to have your test results.  Where can you learn more?  Go to https://www.[employer].net/patiented  Enter K976 in the search box to learn more about \"Gonorrhea and Chlamydia: About These Tests.\"  Current as of: 2023               Content Version: 13.8    1217-1346 [employer].   Care instructions adapted under license by your healthcare professional. If you " have questions about a medical condition or this instruction, always ask your healthcare professional. Healthwise, [facility] disclaims any warranty or liability for your use of this information.      Trichomoniasis: About This Test  What is it?     This test uses a sample of urine or other body fluid to look for the tiny parasite that causes trichomoniasis (also called trich). The fluid sample can come from the vagina, cervix, or urethra. Your doctor may choose to use one or more of many available tests.  Why is it done?  A trich test may be done to:  Find out if symptoms are caused by trich.  Check people who are at high risk for being infected with trich.  Check after treatment to make sure that the infection is gone.  How do you prepare for the test?  If you are going to have a urine test, do not urinate for at least 1 hour before the test.  How is the test done?  For a direct sample, a swab is used to collect body fluid from the cervix, vagina, or urethra. Your doctor may collect the sample. Or you may be given instructions on how to collect your own sample.  For a urine sample, you will collect the urine that comes out when you first start to urinate. Don't wipe the area clean before you urinate.  How long does the test take?  It will take a few minutes to collect a sample.  What happens after the test?  You can go home right away.  You can go back to your usual activities right away.  You may get the test results the same day or several days later. It depends on the test used.  If you do have an infection, don't have sexual intercourse for 7 days after you start treatment. Your sex partner(s) should also be treated.  Follow-up care is a key part of your treatment and safety. Be sure to make and go to all appointments, and call your doctor if you are having problems. Ask your doctor when you can expect to have your test results.  Current as of: April 19, 2023               Content Version: 13.8    8375-6477  "[employer].   Care instructions adapted under license by your healthcare professional. If you have questions about a medical condition or this instruction, always ask your healthcare professional. [employer] disclaims any warranty or liability for your use of this information.      HIV Testing: Care Instructions  Overview  You can get tested for the human immunodeficiency virus (HIV). Most doctors use a blood test to check for HIV antibodies and antigens in your blood. It may also check for the genetic material (RNA) of HIV. Some tests use saliva to check for HIV antibodies. But these aren't as accurate. For example, they may give a false result if you've just been infected.  What do the results mean?    Normal (negative)    No HIV antibodies, antigens, or RNA were found.  You may need more testing. It can make sure your test results are correct.    Uncertain (indeterminate)    Test results didn't clearly show if you have an HIV infection.  HIV antibodies or antigens may not have formed yet.  Some other type of antibody or antigen may have affected the results.  You will need another test to be sure.    Abnormal (positive)    HIV antibodies, antigens, or RNA were found.  If you haven't had an RNA test yet, one will be done. If it's positive, you have HIV.  If your test result is positive, your doctor will talk to you. You will discuss starting treatment.  Follow-up care is a key part of your treatment and safety. Be sure to make and go to all appointments, and call your doctor if you are having problems. It's also a good idea to know your test results and keep a list of the medicines you take.  Where can you learn more?  Go to https://www.[employer].net/patiented  Enter T792 in the search box to learn more about \"HIV Testing: Care Instructions.\"  Current as of: June 13, 2023               Content Version: 13.8 2006-2023 [employer].   Care instructions adapted under " license by your healthcare professional. If you have questions about a medical condition or this instruction, always ask your healthcare professional. [employer] disclaims any warranty or liability for your use of this information.      Hepatitis C Virus Tests: About These Tests  What are they?     Hepatitis C virus tests are blood tests that check for substances in the blood that show whether you have hepatitis C now or had it in the past. The tests can also tell you what type of hepatitis C you have and how severe the disease is. This can help your doctor with treatment.  If the tests show that you have long-term hepatitis C, you need to take steps to prevent spreading the disease.  Why are these tests done?  You may need these tests if:  You have symptoms of hepatitis.  You may have been exposed to the virus. You are more likely to have been exposed to the virus if you inject drugs or are exposed to body fluids (such as if you are a health care worker).  You've had other tests that show you have liver problems.  You are 18 to 79 years old.  You have an HIV infection.  The tests also are done to help your doctor decide about your treatment and see how well it works.  How do you prepare for the test?  In general, there's nothing you have to do before this test, unless your doctor tells you to.  How is the test done?  A health professional uses a needle to take a blood sample, usually from the arm.  What happens after these tests?  You will probably be able to go home right away.  You can go back to your usual activities right away.  Follow-up care is a key part of your treatment and safety. Be sure to make and go to all appointments, and call your doctor if you are having problems. It's also a good idea to keep a list of the medicines you take. Ask your doctor when you can expect to have your test results.  Where can you learn more?  Go to https://www.[employer].net/patiented  Enter W551 in the search  "box to learn more about \"Hepatitis C Virus Tests: About These Tests.\"  Current as of: June 13, 2023               Content Version: 13.8    8609-1891 [employer].   Care instructions adapted under license by your healthcare professional. If you have questions about a medical condition or this instruction, always ask your healthcare professional. [employer] disclaims any warranty or liability for your use of this information.      Learning About Fetal Ultrasound Results  What is a fetal ultrasound?     Fetal ultrasound is a test that lets your doctor see an image of your baby. Your doctor learns information about your baby from this picture. You may find out, for example, if you are having a boy or a girl. But the main reason you have this test is to get information about your baby's health.  (You may hear your baby called a fetus. This is a common medical term for a baby that's growing in the mother's uterus.)  What kind of information can you learn from this test?  The findings of an ultrasound fall into two categories, normal and abnormal.  Normal  The fetus is the right size for its age.  The placenta is the expected size and does not cover the cervix.  There is enough amniotic fluid in the uterus.  No birth defects can be seen.  Abnormal  The fetus is small or large for its age.  The placenta covers the cervix.  There is too much or too little amniotic fluid in the uterus.  The fetus may have a birth defect.  What does an abnormal result mean?  Abnormal seems to imply that something is wrong with your baby. But what it means is that the test has shown something the doctor wants to take a closer look at.  And that's what happens next. Your doctor will talk to you about what further test or tests you may need.  What do the results mean?  Some of the things your doctor may see on an abnormal ultrasound include:  Echogenic bowel.  The bowel looks very bright on the screen. This could mean " that there's blood in the bowel. Or it could mean that something is blocking the small bowel.  Increased nuchal translucency.  The ultrasound measures the thickness at the back of the baby's neck. An increase in thickness is sometimes an early sign of Down syndrome.  Increased or decreased amniotic fluid.  The doctor will look for a reason for the level of amniotic fluid and will watch the pregnancy closely as it progresses.  Large ventricles.  Ventricles in the brain look larger than they should. Your doctor may take a closer look at the brain.  Renal pyelectasis/hydronephrosis.  The ultrasound measures the fluid around the kidney. If there is more fluid than expected, there is a chance of urinary tract or kidney problems.  Short long bones.  The ultrasound measures certain arm and leg bones. A long bone (humerus or femur) that is shorter than average could be a sign of Down syndrome.  Subchorionic hemorrhage.  An ultrasound can show bleeding under one of the membranes that surrounds the fetus. Some women don't have symptoms of bleeding. The ultrasound can find this problem when women are not bleeding from their vagina. Women who have this condition have a slightly higher chance of miscarriage.  What do you do now?  Take a deep breath, and let it out. Keep in mind that an abnormal finding on an ultrasound, after it's coupled with more information, may:  Turn out to be nothing.  Turn out to be something mild that won't affect the baby.  Turn out to be something more serious. But if this happens, early diagnosis helps you and your doctor plan treatment options sooner rather than later.  Your medical team is there for you. So are your family and friends. Ask questions, and get the help and support you need.  Follow-up care is a key part of your treatment and safety. Be sure to make and go to all appointments, and call your doctor if you are having problems. It's also a good idea to know your test results and keep a  "list of the medicines you take.  Where can you learn more?  Go to https://www.[employer].net/patiented  Enter K451 in the search box to learn more about \"Learning About Fetal Ultrasound Results.\"  Current as of: July 11, 2023               Content Version: 13.8    6236-3694 [employer].   Care instructions adapted under license by your healthcare professional. If you have questions about a medical condition or this instruction, always ask your healthcare professional. [employer] disclaims any warranty or liability for your use of this information.      Learning About Prenatal Visits  Overview     Regular prenatal visits are very important during any pregnancy. These quick office visits may seem simple and routine. But they can help you have a safe and healthy pregnancy. Your doctor is watching for problems that can only be found through regular checkups. The visits also give you and your doctor time to build a good relationship.  It's common to see your doctor every 4 weeks until week 28 of pregnancy. Then the visits will happen more often. From weeks 28 to 36, it's common to have visits every 2 to 3 weeks. In the final month of pregnancy, you likely will see your doctor every week. Your doctor may want to see you more or less often, depending on your health, your age, and if you've had a normal, full-term pregnancy before.  At different times in your pregnancy, you will have exams and tests. Some are routine. Others are done only when there is a chance of a problem. Everything healthy you do for your body helps you have a healthy pregnancy. Rest when you need it. Eat well, drink plenty of water, and exercise regularly.  What happens during a prenatal visit?  You will have blood pressure checks, along with urine tests. You also may have blood tests. If you need to go to the bathroom while waiting for the doctor, tell the nurse. You will be given a sample cup so your urine can be " tested.  You will be weighed and have your belly measured.  Your doctor may listen to the fetal heartbeat with a special device.  At about 24 weeks, and possibly earlier in your pregnancy, your doctor will check your blood sugar (glucose tolerance test) for diabetes that can occur during pregnancy. This is gestational diabetes, which can be harmful.  You will have tests to check for infections that could harm your . These include group B streptococcus and hepatitis B.  Your doctor may do ultrasounds to check for problems. This also checks the position of the fetus. An ultrasound uses sound waves to produce a picture of the fetus.  You may get your vaccines updated.  Your doctor may ask you questions to check for signs of anxiety or depression. Tell your doctor if you feel sad, anxious, or hopeless for more than a few days.  You may have other tests at any time during your pregnancy.  Use your visits to discuss with your doctor any concerns you have.  How can you care for yourself at home?  Get plenty of rest.  Try to exercise every day, if your doctor says it is okay. If you have not exercised in the past, start out slowly. For example, you can take short walks each day.  Choose healthy foods, such as fruits, vegetables, whole grains, lean proteins, low-fat dairy, and healthy fats.  Drink plenty of fluids. Cut down on drinks with caffeine, such as coffee, tea, and cola. If you have kidney, heart, or liver disease and have to limit fluids, talk with your doctor before you increase the amount of fluids you drink.  Try to avoid chemical fumes, paint fumes, and poisons.  If you smoke, vape, or use alcohol, marijuana, or other drugs, quit or cut back as much as you can. Talk to your doctor if you need help quitting.  Review all of your medicines, including over-the-counter medicines and supplements, with your doctor. Some of your routine medicines may need to be changed. Do not stop or start taking any medicines  "without talking to your doctor first.  Follow-up care is a key part of your treatment and safety. Be sure to make and go to all appointments, and call your doctor if you are having problems. It's also a good idea to know your test results and keep a list of the medicines you take.  Where can you learn more?  Go to https://www.[employer].net/patiented  Enter J502 in the search box to learn more about \"Learning About Prenatal Visits.\"  Current as of: July 11, 2023               Content Version: 13.8    7373-8981 [employer].   Care instructions adapted under license by your healthcare professional. If you have questions about a medical condition or this instruction, always ask your healthcare professional. [employer] disclaims any warranty or liability for your use of this information.      Intimate Partner Violence: Care Instructions  Overview     If you want to save this information but don't think it is safe to take it home, see if a trusted friend can keep it for you. Plan ahead. Know who you can call for help, and memorize the phone number.   Be careful online too. Your online activity may be seen by others. Do not use your personal computer or device to read about this topic. Use a safe computer, such as one at work, a friend's home, or a library.    Intimate partner violence--a type of domestic abuse--is different from an argument now and then. It is a pattern of abuse that one person may use to control another person's behavior. It may start with threats and name-calling. Then, it may lead to more serious acts, like pushing and slapping. The abuse also may occur in other areas. For example, the abuser may withhold money or spend a partner's money without their knowledge.  Abuse can cause serious harm. You are more likely to have a long-term health problem from the injuries and stress of living in a violent relationship. People who are sexually abused by their partners have more " sexually transmitted infections and unplanned pregnancies. Anyone who is abused also faces emotional pain. Anyone can be abused in relationships. In some relationships, both people use abusive behavior.  If you are pregnant, abuse can cause problems such as poor weight gain, infections, and bleeding. Abuse during this time may increase your baby's risk of low birth weight, premature birth, and death.  Follow-up care is a key part of your treatment and safety. Be sure to make and go to all appointments, and call your doctor if you are having problems. It's also a good idea to know your test results and keep a list of the medicines you take.  How can you care for yourself at home?  If you do not have a safe place to stay, discuss this with your doctor before you leave.  Have a plan for where to go, how to leave your home, and where to stay in case of an emergency. Do not tell your partner about your plan. Contact:  The National Domestic Violence Hotline toll-free at 1-858.259.7825. They can help you find resources in your area.  Your local police department, hospital, or clinic for information about shelters and safe homes near you.  Talk to a trusted friend or neighbor, a counselor, or a feder[place] leader. Do not feel that you have to hide what happened.  Teach your children how to call for help in an emergency.  Be alert to warning signs, such as threats, heavy alcohol use, or drug use. This can help you avoid danger.  If you can, make sure that there are no guns or other weapons in your home.  When should you call for help?   Call 911 anytime you think you may need emergency care. For example, call if:    You or someone else has just been abused.     You think you or someone else is in danger of being abused.   Watch closely for changes in your health, and be sure to contact your doctor if you have any problems.  Where can you learn more?  Go to https://www.healthwise.net/patiented  Enter G282 in the search box to learn  "more about \"Intimate Partner Violence: Care Instructions.\"  Current as of: June 25, 2023               Content Version: 13.8    9450-7481 [employer].   Care instructions adapted under license by your healthcare professional. If you have questions about a medical condition or this instruction, always ask your healthcare professional. [employer] disclaims any warranty or liability for your use of this information.      Intimate Partner Violence Safety Instructions: Care Instructions  Overview     If you want to save this information but don't think it is safe to take it home, see if a trusted friend can keep it for you. Plan ahead. Know who you can call for help, and memorize the phone number.   Be careful online too. Your online activity may be seen by others. Do not use your personal computer or device to read about this topic. Use a safe computer, such as one at work, a friend's home, or a library.    When you are abused by a spouse or partner, you can take actions to protect yourself and your children.  You can increase your safety whether you decide to stay with your spouse or partner or you decide to leave. You may want to make a safety plan and pack a bag ahead of time. This will help you leave quickly when you decide to. Remember, you cannot change your partner's actions, but you can find help for you and your children. No one deserves to be abused.  Follow-up care is a key part of your treatment and safety. Be sure to make and go to all appointments, and call your doctor if you are having problems. It's also a good idea to know your test results and keep a list of the medicines you take.  How can you care for yourself at home?  Make a plan for your safety   If you decide to stay with your abusive spouse or partner, you can do the following to increase your safety:  Decide what works best to keep you safe in an emergency.  Know who you can call to help you in an emergency.  Decide " if you will call the police if you get hurt again. If you can, agree on a signal with your children or neighbor to call the police for you if you need help. You can flash lights or hang something out of a window.  Choose a safe place to go for a short time if you need to leave home. Memorize the address and phone number.  Learn escape routes out of your home in case you need to leave in a hurry. Teach your children different ways to get out of your home quickly if they need to.  If you can, hide or lock up things that can be used as weapons (guns, knives, hammers).  Learn the number of a domestic violence shelter. Talk to the people there about how they can help.  Find out about other community resources that can help you.  Take pictures of bruises or other injuries if you can. You can also take pictures of things your abuser has broken.  Teach your children that violence is never okay. Tell them that they do not deserve to be hurt.  Pack a bag   Prepare a bag with things you will need if you leave suddenly. Leave it with a friend, a relative, or someone else you trust. You could include the following items in the bag:  A set of keys to your home and car.  Emergency phone numbers and addresses.  Money such as cash or checks. You can also ask a friend, a relative, or someone else you trust to hold money for you.  Copies of legal documents such as house and car titles or rent receipts, birth certificates, Social Security card, voter registration, marriage and 's licenses, and your children's health records.  Personal items you would need for a few days, such as clothes, a toothbrush, toothpaste, and any medicines you or your children need.  A favorite toy or book for your child or children.  Diapers and bottles, if you have very young children.  Pictures that show signs of abuse and violence. You may also add pictures of your abuser.  If you leave   If you decide to leave, you can take the following steps:  Go  "to the emergency room at a hospital if you have been hurt.  Think about asking the police to be with you as you leave. They can protect you as you leave your home.  If you decide to leave secretly, remember that activities can be tracked. Your abuser may still have access to your cell phone, email, and credit cards. It may be possible for these to be traced. Always be aware of your surroundings.  If this is an emergency, do not worry about gathering up anything. Just leave--your safety is most important.  If your abuser moves out, change the locks on the doors. If you have a security system, change the access code.  When should you call for help?   Call 911 anytime you think you may need emergency care. For example, call if:    You or someone else has just been abused.     You think you or someone else is in danger of being abused.   Watch closely for changes in your health, and be sure to contact your doctor if you have any problems.  Where can you learn more?  Go to https://www.[employer].net/patiented  Enter A752 in the search box to learn more about \"Intimate Partner Violence Safety Instructions: Care Instructions.\"  Current as of: June 25, 2023               Content Version: 13.8    7348-3862 [employer].   Care instructions adapted under license by your healthcare professional. If you have questions about a medical condition or this instruction, always ask your healthcare professional. [employer] disclaims any warranty or liability for your use of this information.      Learning About Intimate Partner Violence  What is intimate partner violence?  Intimate partner violence is a type of domestic abuse. It's threatening, emotionally harmful, or violent behavior in a personal relationship. It can happen between past or current partners or spouses. In some relationships both people abuse each other. One partner may be more abusive. Or the abuse may be equal.  Abuse can affect people of " any ethnic group, race, or Gnosticist. It can affect teens, adults, or the elderly. And it can happen to people of any sexual orientation, gender, or social status.  Abusers use fear, bullying, and threats to control their partners. They may control what their partners do. They may control where their partners go or who they see. They may act jealous, controlling, or possessive. These early signs of abuse may happen soon after the start of the relationship. Sometimes it can be hard to notice abuse at first. But after the relationship becomes more serious, the abuse may get worse.  If you are being abused in your relationship, it's important to get help. The abuse is not your fault. You don't have to face it alone.  Be careful  It may not be safe to take home domestic abuse information like this handout. Some people ask a trusted friend to keep it for them. It's also important to plan ahead and to memorize the phone number of places you can go for help. If you are concerned about your safety, do not use your computer, smartphone, or tablet to read about domestic abuse.   What are the types of intimate partner violence?  Abuse can happen in different ways. Each type can happen on its own or in combination with others.  Emotional abuse  Emotional abuse is a pattern of threats, insults, or controlling behavior. It includes verbal abuse. It goes beyond healthy disagreements in a relationship. It's a sign of an unhealthy relationship.  Do you feel threatened, intimidated, or controlled?  Does your partner:  Threaten your children, other family members, or pets?  Use jokes meant to embarrass or shame you?  Call you names?  Tell you that you are a bad parent?  Threaten to take away your children?  Threaten to have you or your family members deported?  Control your access to money or other basic needs?  Control what you do, who you see or talk to, or where you go?  Another form of emotional abuse is denying that it is  happening. Or the abuser may act like the abuse is no big deal or is your fault.  Sexual abuse  With sexual abuse, abusers may try to convince or force you to have sex. They may force you into sex acts you're not comfortable with. Or they may sexually assault you. Sexual abuse can happen even if you are in a committed relationship.  Physical abuse  Physical abuse means that a partner hits, kicks, or does something else to physically hurt you. Physical abuse that starts with a slap might lead to kicking, shoving, and choking over time. The abuser may also threaten to hurt or kill you.  Stalking  Stalking means that an abuser gives you attention that you do not want and that causes you fear. Examples of stalking include:  Following you.  Showing up at places where the abuser isn't invited, such as at your work or school.  Constantly calling or texting you.  What problems can  to?  Intimate partner violence can be very dangerous. It can cause serious, repeated injury. It can even lead to death.  All forms of abuse can cause long-term health problems from the stress of a violent relationship. Verbal abuse can lead to sexual and physical abuse.  Abuse causes:  Emotional pain.  Depression.  Anxiety.  Post-traumatic stress.  Sexual abuse can lead to sexually transmitted infections (such as HIV/AIDS) and unplanned pregnancy.  Pregnancy can be a very dangerous time for people in abusive relationships. Abuse can cause or increase the risk of problems during pregnancy. These include low weight gain, anemia, infections, and bleeding. Abuse may also increase your baby's risk of low birth weight, premature birth, and death.  It can be hard for some victims of abuse to ask for help or to leave their relationship. You may feel scared, stuck, or not sure what steps to take. But it's important not to ignore abuse. Talking to someone you trust could be the first step to ending the abuse and taking care of your own health and  "happiness again. There are resources available that can help keep you safe.  Where can you get help?  Talk to a trusted friend. Find a local advocacy group, or talk to your doctor about the abuse.  Contact the National Domestic Violence Hotline at 6-354-954-YBJD (1-458.499.8331) for more safety tips. They can guide you to groups in your area that can help. Or go to the National Coalition Against Domestic Violence website at www.[employer].org to learn more.  Domestic violence groups or a counselor in your area can help you make a safety plan for yourself and your children.  When to call for help  Call 911 anytime you think you may need emergency care. For example, call if:  You think that you or someone you know is in danger of being abused.  You have been hurt and can't have someone safely take you to emergency care.  You have just been abused.  A family member has just been abused.  Where can you learn more?  Go to https://www.[employer].net/patiented  Enter S665 in the search box to learn more about \"Learning About Intimate Partner Violence.\"  Current as of: June 25, 2023               Content Version: 13.8    4472-5666 [employer].   Care instructions adapted under license by your healthcare professional. If you have questions about a medical condition or this instruction, always ask your healthcare professional. [employer] disclaims any warranty or liability for your use of this information.      Vaginal Bleeding During Pregnancy: Care Instructions  Overview     It's common to have some vaginal spotting when you are pregnant. In some cases, the bleeding isn't serious. And there aren't any more problems with the pregnancy.  But sometimes bleeding is a sign of a more serious problem. This is more common if the bleeding is heavy or painful. Examples of more serious problems include miscarriage, an ectopic pregnancy, and a problem with the placenta.  You may have to see your doctor again " to be sure everything is okay. You may also need more tests to find the cause of the bleeding.  Home treatment may be all you need. But it depends on what is causing the bleeding. Be sure to tell your doctor if you have any new symptoms or if your symptoms get worse.  The doctor has checked you carefully, but problems can develop later. If you notice any problems or new symptoms, get medical treatment right away.  Follow-up care is a key part of your treatment and safety. Be sure to make and go to all appointments, and call your doctor if you are having problems. It's also a good idea to know your test results and keep a list of the medicines you take.  How can you care for yourself at home?  If your doctor prescribed medicines, take them exactly as directed. Call your doctor if you think you are having a problem with your medicine.  Do not have vaginal sex until your doctor says it's okay.  Do not put anything in your vagina until your doctor says it's okay.  Ask your doctor about other activities you can or can't do.  Get a lot of rest. Being pregnant can make you tired.  Do not use nonsteroidal anti-inflammatory drugs (NSAIDs), such as ibuprofen (Advil, Motrin), naproxen (Aleve), or aspirin, unless your doctor says it is okay.  When should you call for help?   Call 911 anytime you think you may need emergency care. For example, call if:    You passed out (lost consciousness).     You have severe vaginal bleeding. This means you are soaking through a pad each hour for 2 or more hours.     You have sudden, severe pain in your belly or pelvis.   Call your doctor now or seek immediate medical care if:    You have new or worse vaginal bleeding.     You are dizzy or lightheaded, or you feel like you may faint.     You have pain in your belly, pelvis, or lower back.     You think that you are in labor.     You have a sudden release of fluid from your vagina.     You've been having regular contractions for an hour. This  "means that you've had at least 8 contractions within 1 hour or at least 4 contractions within 20 minutes, even after you change your position and drink fluids.     You notice that your baby has stopped moving or is moving much less than normal.   Watch closely for changes in your health, and be sure to contact your doctor if you have any problems.  Where can you learn more?  Go to https://www.[employer].net/patiented  Enter N829 in the search box to learn more about \"Vaginal Bleeding During Pregnancy: Care Instructions.\"  Current as of: July 11, 2023               Content Version: 13.8    5348-4519 [employer].   Care instructions adapted under license by your healthcare professional. If you have questions about a medical condition or this instruction, always ask your healthcare professional. [employer] disclaims any warranty or liability for your use of this information.      "

## 2025-01-11 ENCOUNTER — HEALTH MAINTENANCE LETTER (OUTPATIENT)
Age: 35
End: 2025-01-11

## (undated) RX ORDER — LIDOCAINE HYDROCHLORIDE 10 MG/ML
INJECTION, SOLUTION EPIDURAL; INFILTRATION; INTRACAUDAL; PERINEURAL
Status: DISPENSED
Start: 2020-06-15